# Patient Record
Sex: MALE | Race: WHITE | Employment: OTHER | URBAN - NONMETROPOLITAN AREA
[De-identification: names, ages, dates, MRNs, and addresses within clinical notes are randomized per-mention and may not be internally consistent; named-entity substitution may affect disease eponyms.]

---

## 2017-11-24 DIAGNOSIS — E11.9 DIABETES MELLITUS WITHOUT COMPLICATION (CMS/HCC): Primary | ICD-10-CM

## 2017-11-24 RX ORDER — METFORMIN HYDROCHLORIDE 500 MG/1
TABLET ORAL
Qty: 90 TABLET | Refills: 0 | Status: SHIPPED | OUTPATIENT
Start: 2017-11-24 | End: 2018-01-16 | Stop reason: SDUPTHER

## 2018-01-02 ENCOUNTER — ANCILLARY ORDERS (OUTPATIENT)
Dept: FAMILY MEDICINE | Facility: CLINIC | Age: 73
End: 2018-01-02
Payer: MEDICARE

## 2018-01-02 ENCOUNTER — TELEPHONE (OUTPATIENT)
Dept: FAMILY MEDICINE | Facility: CLINIC | Age: 73
End: 2018-01-02

## 2018-01-02 DIAGNOSIS — E11.9 TYPE 2 DIABETES MELLITUS WITHOUT COMPLICATION, UNSPECIFIED LONG TERM INSULIN USE STATUS: Primary | ICD-10-CM

## 2018-01-02 DIAGNOSIS — E11.9 TYPE 2 DIABETES MELLITUS WITHOUT COMPLICATION, UNSPECIFIED LONG TERM INSULIN USE STATUS: ICD-10-CM

## 2018-01-04 ENCOUNTER — APPOINTMENT (OUTPATIENT)
Dept: LAB | Facility: CLINIC | Age: 73
End: 2018-01-04
Payer: MEDICARE

## 2018-01-04 LAB
ALBUMIN SERPL-MCNC: 4.1 G/DL (ref 3.4–5)
ALP SERPL-CCNC: 64 U/L (ref 45–115)
ALT SERPL-CCNC: 61 U/L (ref 0–52)
ANION GAP SERPL CALC-SCNC: 7 MMOL/L (ref 3–11)
AST SERPL-CCNC: 36 U/L (ref 0–39)
BILIRUB SERPL-MCNC: 0.67 MG/DL (ref 0–1.4)
BUN SERPL-MCNC: 14 MG/DL (ref 7–25)
CALCIUM ALBUM COR SERPL-MCNC: 9.1 MG/DL (ref 8.5–10.1)
CALCIUM SERPL-MCNC: 9.2 MG/DL (ref 8.6–10.1)
CHLORIDE SERPL-SCNC: 101 MMOL/L (ref 98–107)
CO2 SERPL-SCNC: 29 MMOL/L (ref 21–32)
CREAT SERPL-MCNC: 1.1 MG/DL (ref 0.8–1.3)
EST. AVERAGE GLUCOSE BLD GHB EST-MCNC: 203 MG/DL
GFR SERPL CREATININE-BSD FRML MDRD: 66 ML/MIN/1.73M*2
GLUCOSE SERPL-MCNC: 221 MG/DL (ref 70–105)
HBA1C MFR BLD: 8.7 % (ref 4.8–6)
POTASSIUM SERPL-SCNC: 4.9 MMOL/L (ref 3.6–5)
PROT SERPL-MCNC: 7.5 G/DL (ref 6.4–8.2)
SODIUM SERPL-SCNC: 137 MMOL/L (ref 135–145)

## 2018-01-04 PROCEDURE — 83036 HEMOGLOBIN GLYCOSYLATED A1C: CPT | Performed by: PHYSICIAN ASSISTANT

## 2018-01-04 PROCEDURE — 80053 COMPREHEN METABOLIC PANEL: CPT | Performed by: PHYSICIAN ASSISTANT

## 2018-01-04 PROCEDURE — 36415 COLL VENOUS BLD VENIPUNCTURE: CPT | Performed by: PHYSICIAN ASSISTANT

## 2018-01-16 ENCOUNTER — OFFICE VISIT (OUTPATIENT)
Dept: FAMILY MEDICINE | Facility: CLINIC | Age: 73
End: 2018-01-16
Payer: MEDICARE

## 2018-01-16 VITALS
DIASTOLIC BLOOD PRESSURE: 85 MMHG | HEIGHT: 72 IN | RESPIRATION RATE: 12 BRPM | SYSTOLIC BLOOD PRESSURE: 125 MMHG | TEMPERATURE: 98 F | WEIGHT: 236 LBS | BODY MASS INDEX: 31.97 KG/M2 | HEART RATE: 75 BPM | OXYGEN SATURATION: 95 %

## 2018-01-16 DIAGNOSIS — K21.9 GASTROESOPHAGEAL REFLUX DISEASE, ESOPHAGITIS PRESENCE NOT SPECIFIED: ICD-10-CM

## 2018-01-16 DIAGNOSIS — E78.49 OTHER HYPERLIPIDEMIA: ICD-10-CM

## 2018-01-16 DIAGNOSIS — E11.8 TYPE 2 DIABETES MELLITUS WITH COMPLICATION, WITHOUT LONG-TERM CURRENT USE OF INSULIN (CMS/HCC): Primary | ICD-10-CM

## 2018-01-16 DIAGNOSIS — I10 ESSENTIAL HYPERTENSION: ICD-10-CM

## 2018-01-16 PROCEDURE — 99214 OFFICE O/P EST MOD 30 MIN: CPT | Performed by: PHYSICIAN ASSISTANT

## 2018-01-16 RX ORDER — METFORMIN HYDROCHLORIDE 500 MG/1
1000 TABLET ORAL 2 TIMES DAILY WITH MEALS
Qty: 360 TABLET | Refills: 3 | Status: SHIPPED | OUTPATIENT
Start: 2018-01-16 | End: 2019-01-16 | Stop reason: WASHOUT

## 2018-01-16 RX ORDER — ASPIRIN 81 MG/1
81 TABLET ORAL DAILY
COMMUNITY
End: 2022-09-22 | Stop reason: ALTCHOICE

## 2018-01-16 NOTE — PROGRESS NOTES
Subjective      Abhilash Huggins is a 72 y.o. male who presents for ***.    KANDI Hung presents to the clinic for recheck on Dm, HTN, and some neuropathy.  He was on lyrica.   The following have been reviewed and updated as appropriate in this visit:  No text in SmartText        Allergies   Allergen Reactions   • Amoxicillin      HIVES, NAUSEA   • Penicillins      HIVES, NAUSEA     Current Outpatient Prescriptions   Medication Sig Dispense Refill   • aspirin 81 mg EC tablet Take 81 mg by mouth daily.     • dexlansoprazole (DEXILANT) 60 mg capsule  30 0   • lisinopril (PRINIVIL,ZESTRIL) 5 mg tablet  90 0   • metFORMIN (GLUCOPHAGE) 500 mg tablet TAKE 1 TABLET BY MOUTH IN THE MORNING AND 2 TABLETS IN THE EVENING. 90 tablet 0   • metoprolol tartrate (LOPRESSOR) 25 mg tablet  60 0   • ranitidine (ZANTAC) 150 mg tablet one to two tablets by mouth at night as needed 90 0   • sitaGLIPtin (JANUVIA) 100 mg tablet  30 0   • atorvastatin (LIPITOR) 80 mg tablet  30 0   • clopidogrel (PLAVIX) 75 mg tablet  90 0     No current facility-administered medications for this visit.      History reviewed. No pertinent past medical history.  Past Surgical History:   Procedure Laterality Date   • CARDIAC SURGERY  01/04/2016    Cardiac bypass X4 vessels   • CARDIAC SURGERY  01/01/1997    2 stents   • CATARACT EXTRACTION Left    • CHOLECYSTECTOMY  02/14/2007   • COLONOSCOPY      Approx 8 years ago with Dr Mcguire- was to have 5 year follow up   • COLONOSCOPY  01/01/2015    Polyps     Family History   Problem Relation Age of Onset   • Heart disease Mother    • Heart attack Father      Myocardial infarction   • Diabetes type II Brother      Toes removed due to diabetes     Social History     Social History   • Marital status:      Spouse name: N/A   • Number of children: N/A   • Years of education: N/A     Social History Main Topics   • Smoking status: Former Smoker     Years: 20.00   • Smokeless tobacco: Never Used   • Alcohol use Yes       Comment: Occasionally   • Drug use: No   • Sexual activity: Not Asked     Other Topics Concern   • None     Social History Narrative   • None       Review of Systems    Objective   /85 (BP Location: Right arm, Patient Position: Sitting, Cuff Size: Reg)   Pulse 75   Temp 36.7 °C (98 °F) (Temporal)   Resp 12   Ht 1.829 m (6')   Wt 107 kg (236 lb)   SpO2 95%   BMI 32.01 kg/m²     Physical Exam    ASSESSMENT AND PLAN   There are no diagnoses linked to this encounter.    OBDULIA NOLEN

## 2018-01-16 NOTE — PROGRESS NOTES
Subjective      Abhilash Huggins is a 73 y.o. male who presents for recheck on DM.    HPI  Pt presents to the clinic for recheck on Dm. He has been out of his metformin for few weeks-has been using the Januvia.  He has no new eye concerns. He does have foot pain however declines trial of medication-failed lyrica-bad dreams.   He will be seeing his cardiologist in the next couple of weeks to help ensure not an arterial issue.  He currently denies any chest pain or tightness.GERD is controlled with the PPI.   He did get labs today.  ROS otherwise neg.   The following have been reviewed and updated as appropriate in this visit:  No text in SmartText        Allergies   Allergen Reactions   • Amoxicillin      HIVES, NAUSEA   • Penicillins      HIVES, NAUSEA     Current Outpatient Prescriptions   Medication Sig Dispense Refill   • aspirin 81 mg EC tablet Take 81 mg by mouth daily.     • dexlansoprazole (DEXILANT) 60 mg capsule  30 0   • lisinopril (PRINIVIL,ZESTRIL) 5 mg tablet  90 0   • metFORMIN (GLUCOPHAGE) 500 mg tablet Take 2 tablets (1,000 mg total) by mouth 2 (two) times a day with meals. 360 tablet 3   • metoprolol tartrate (LOPRESSOR) 25 mg tablet  60 0   • ranitidine (ZANTAC) 150 mg tablet one to two tablets by mouth at night as needed 90 0   • sitaGLIPtin (JANUVIA) 100 mg tablet  30 0   • atorvastatin (LIPITOR) 80 mg tablet  30 0   • canagliflozin 300 mg tablet Take 1 tablet (300 mg total) by mouth daily. 30 tablet 11   • clopidogrel (PLAVIX) 75 mg tablet  90 0     No current facility-administered medications for this visit.      History reviewed. No pertinent past medical history.  Past Surgical History:   Procedure Laterality Date   • CARDIAC SURGERY  01/04/2016    Cardiac bypass X4 vessels   • CARDIAC SURGERY  01/01/1997    2 stents   • CATARACT EXTRACTION Left    • CHOLECYSTECTOMY  02/14/2007   • COLONOSCOPY      Approx 8 years ago with Dr Mcguire- was to have 5 year follow up   • COLONOSCOPY  01/01/2015     Polyps     Family History   Problem Relation Age of Onset   • Heart disease Mother    • Heart attack Father      Myocardial infarction   • Diabetes type II Brother      Toes removed due to diabetes     Social History     Social History   • Marital status:      Spouse name: N/A   • Number of children: N/A   • Years of education: N/A     Social History Main Topics   • Smoking status: Former Smoker     Years: 20.00   • Smokeless tobacco: Never Used   • Alcohol use Yes      Comment: Occasionally   • Drug use: No   • Sexual activity: Not Asked     Other Topics Concern   • None     Social History Narrative   • None       Review of Systems    Objective   /85 (BP Location: Right arm, Patient Position: Sitting, Cuff Size: Reg)   Pulse 75   Temp 36.7 °C (98 °F) (Temporal)   Resp 12   Ht 1.829 m (6')   Wt 107 kg (236 lb)   SpO2 95%   BMI 32.01 kg/m²     Physical Exam   Constitutional: He is oriented to person, place, and time. He appears well-developed and well-nourished. No distress.   HENT:   Head: Normocephalic.   Nose: Nose normal.   Mouth/Throat: Oropharynx is clear and moist. No oropharyngeal exudate.   Eyes: Conjunctivae and EOM are normal. Pupils are equal, round, and reactive to light.   Neck: No thyromegaly present.   Cardiovascular: Normal rate, regular rhythm and normal heart sounds.    Pulmonary/Chest: Effort normal and breath sounds normal.   Abdominal: Soft. Bowel sounds are normal.   Neurological: He is alert and oriented to person, place, and time. He has normal reflexes.   Skin: Skin is warm and dry. He is not diaphoretic.   Psychiatric: He has a normal mood and affect. His behavior is normal.   Nursing note and vitals reviewed.      ASSESSMENT AND PLAN   Diagnoses and all orders for this visit:    Type 2 diabetes mellitus with complication, without long-term current use of insulin (CMS/[facility])  -     metFORMIN (GLUCOPHAGE) 500 mg tablet; Take 2 tablets (1,000 mg total) by mouth 2 (two) times  a day with meals.  -     canagliflozin 300 mg tablet; Take 1 tablet (300 mg total) by mouth daily.    Other hyperlipidemia    Gastroesophageal reflux disease, esophagitis presence not specified    Essential hypertension      A1c is elevated thus will add invokana half tablet daily.  Patient will continue on other meds as ordered. To consider nutritional counseling.   Blood pressure stable.  Patient follow-up with cardiologist in the next couple weeks and get a note to us.  Consider medicines for peripheral neuropathy at this point he declines.  Controlled with Dexilant.  See Jeannette Bailey notes for specifics.  Total time face to face spent with patient was 25 minutes with 50% or more in counseling and coordination of care regarding ADA Standards of Care, diabetes management, labs, medications, medication side effect, hyper/hypoglycemia, complications for diabetes, hypertension and hyperlipidemia.    OBDULIA NOLEN

## 2018-01-17 NOTE — PROGRESS NOTES
"Diabetes Clinic Visit:    Eye exam: 8/30/17 by Dr. Pritchett at [facility]  Foot exam: Conducted today using 10 gm monofilament. Pt states he has bad neuropathy however he was sensate bilat to monofilament testing. Use Emu Oil and Magna Life salve on his feet and feels both help relieve discomfort. States was on Lyrica at one point and it helped a lot but gave him bad/violent dreams so it was stopped. Rates pain (scale of 1-10) at a 4 and by evening a 9.  Often wakes him up middle of the night and then he sleeps in his recliner. Because of foot pain he does not walk long distances but he can use stationary bike without added discomfort.  A1C: 8.7% on 1/4/18 and fasting glucose was 221 mg/dl.    Takes metformin 500 mg in the morning and 1000 mg in the evening but started to run out before [place] so he was \"stretching\" his dosing by just doing 500mg BID. Went to [place] to see family and ran out of med around first of year. Still taking Januiva 100 mg daily. Also on lisinopril and atorvastatin.  He feels this is what caused the A1C to be much higher. A1C was 7.5% 9/12/16. Explained how A1C captures past 3 months so results indicate he has been running higher for some time and may need med change.  Has meter but refuses to test at home.  Interested in the Freestyle Reshma CGM and would be willing to purchase if not covered by Medicare and at reasonable cost. Discussed diet. Brother, who had diabetes, passed away in October and patient says he suffered from diabetes complications the last 2 years of his life.  States it has made him think more about his own diabetes and that he needs to take it more seriously. Discussed education classes and he would like to attend our next session that starts.      Recommendations: 1) Patient to resume metformin with possible increase to max dose if able to tolerate.                                   2) DSMT and MNT referrals - offered [facility] and [place] locations. Prefers [facility].  Lives in [place].                                   3) Educator to help patient obtain information on the [employer].    Will f/u as scheduled in clinic.

## 2018-01-29 ENCOUNTER — COMMUNICATION - HEALTHEAST (OUTPATIENT)
Dept: TELEHEALTH | Facility: CLINIC | Age: 73
End: 2018-01-29

## 2018-01-29 ENCOUNTER — OFFICE VISIT - HEALTHEAST (OUTPATIENT)
Dept: CARDIOLOGY | Facility: CLINIC | Age: 73
End: 2018-01-29

## 2018-01-29 DIAGNOSIS — I51.89 LEFT VENTRICULAR SYSTOLIC DYSFUNCTION, NYHA CLASS 2: ICD-10-CM

## 2018-01-29 DIAGNOSIS — I25.119 CORONARY ARTERY DISEASE INVOLVING NATIVE CORONARY ARTERY OF NATIVE HEART WITH ANGINA PECTORIS (H): ICD-10-CM

## 2018-01-29 DIAGNOSIS — E78.5 HYPERLIPIDEMIA: ICD-10-CM

## 2018-01-29 DIAGNOSIS — I10 ESSENTIAL HYPERTENSION: ICD-10-CM

## 2018-01-29 DIAGNOSIS — E11.9 TYPE 2 DIABETES MELLITUS WITHOUT COMPLICATION, WITHOUT LONG-TERM CURRENT USE OF INSULIN (H): ICD-10-CM

## 2018-01-29 DIAGNOSIS — Z95.1 S/P CABG X 4: ICD-10-CM

## 2018-01-29 PROBLEM — N40.0 BENIGN PROSTATIC HYPERTROPHY WITHOUT URINARY OBSTRUCTION: Status: ACTIVE | Noted: 2018-01-29

## 2018-01-29 PROBLEM — K59.00 CONSTIPATION: Status: ACTIVE | Noted: 2018-01-29

## 2018-01-29 PROBLEM — G90.9 DISORDER OF PERIPHERAL AUTONOMIC NERVOUS SYSTEM: Status: ACTIVE | Noted: 2018-01-29

## 2018-01-29 PROBLEM — K21.9 GASTROESOPHAGEAL REFLUX DISEASE: Status: ACTIVE | Noted: 2018-01-29

## 2018-01-29 RX ORDER — CHLORAL HYDRATE 500 MG
2 CAPSULE ORAL 2 TIMES DAILY
Status: SHIPPED | COMMUNITY
Start: 2018-01-29

## 2018-01-29 RX ORDER — ISOSORBIDE MONONITRATE 30 MG/1
30 TABLET, EXTENDED RELEASE ORAL DAILY
Qty: 90 TABLET | Refills: 3 | Status: SHIPPED | OUTPATIENT
Start: 2018-01-29

## 2018-01-29 ASSESSMENT — MIFFLIN-ST. JEOR: SCORE: 1860.04

## 2018-02-02 ENCOUNTER — RECORDS - HEALTHEAST (OUTPATIENT)
Dept: ADMINISTRATIVE | Facility: OTHER | Age: 73
End: 2018-02-02

## 2018-02-02 ENCOUNTER — AMBULATORY - HEALTHEAST (OUTPATIENT)
Dept: CARDIOLOGY | Facility: CLINIC | Age: 73
End: 2018-02-02

## 2018-03-12 DIAGNOSIS — I10 ESSENTIAL HYPERTENSION: Primary | ICD-10-CM

## 2018-03-13 RX ORDER — LISINOPRIL 5 MG/1
TABLET ORAL
Qty: 90 TABLET | Refills: 0 | Status: SHIPPED | OUTPATIENT
Start: 2018-03-13 | End: 2018-06-20 | Stop reason: SDUPTHER

## 2018-03-23 ENCOUNTER — OFFICE VISIT (OUTPATIENT)
Dept: FAMILY MEDICINE | Facility: CLINIC | Age: 73
End: 2018-03-23
Payer: MEDICARE

## 2018-03-23 VITALS
HEART RATE: 79 BPM | WEIGHT: 227 LBS | RESPIRATION RATE: 12 BRPM | OXYGEN SATURATION: 98 % | BODY MASS INDEX: 30.75 KG/M2 | TEMPERATURE: 98 F | SYSTOLIC BLOOD PRESSURE: 125 MMHG | HEIGHT: 72 IN | DIASTOLIC BLOOD PRESSURE: 80 MMHG

## 2018-03-23 DIAGNOSIS — E11.8 TYPE 2 DIABETES MELLITUS WITH COMPLICATION, WITHOUT LONG-TERM CURRENT USE OF INSULIN (CMS/HCC): ICD-10-CM

## 2018-03-23 DIAGNOSIS — S46.211A BICEPS MUSCLE TEAR, RIGHT, INITIAL ENCOUNTER: Primary | ICD-10-CM

## 2018-03-23 PROCEDURE — 99211 OFF/OP EST MAY X REQ PHY/QHP: CPT

## 2018-03-23 PROCEDURE — 99213 OFFICE O/P EST LOW 20 MIN: CPT | Performed by: PHYSICIAN ASSISTANT

## 2018-03-30 NOTE — PROGRESS NOTES
Subjective      Abhilash Huggins is a 73 y.o. male who presents for arm bruising and pain in the right arm.    HPI  Right arm pain, lifted something and felt a pop. Large amount of bruising around his biceps muscle. Most of the pain is up near the shoulder.  Patient with similar issues on the left side he was seen by or so and at that point no thing surgical was completed due to his declining any therapy.  He currently denies numbness tingling down the arm.  He reports his sugars have been fine.  ROS otherwise neg.        The following have been reviewed and updated as appropriate in this visit:  No text in SmartText        Allergies   Allergen Reactions   • Amoxicillin      HIVES, NAUSEA   • Penicillins      HIVES, NAUSEA     Current Outpatient Prescriptions   Medication Sig Dispense Refill   • aspirin 81 mg EC tablet Take 81 mg by mouth daily.     • atorvastatin (LIPITOR) 80 mg tablet  30 0   • canagliflozin 300 mg tablet Take 1 tablet (300 mg total) by mouth daily. 30 tablet 11   • clopidogrel (PLAVIX) 75 mg tablet  90 0   • dexlansoprazole (DEXILANT) 60 mg capsule  30 0   • lisinopril (PRINIVIL,ZESTRIL) 5 mg tablet TAKE ONE TABLET BY MOUTH EVERY DAY 90 tablet 0   • metFORMIN (GLUCOPHAGE) 500 mg tablet Take 2 tablets (1,000 mg total) by mouth 2 (two) times a day with meals. 360 tablet 3   • metoprolol tartrate (LOPRESSOR) 25 mg tablet  60 0   • ranitidine (ZANTAC) 150 mg tablet one to two tablets by mouth at night as needed 90 0     No current facility-administered medications for this visit.      No past medical history on file.  Past Surgical History:   Procedure Laterality Date   • CARDIAC SURGERY  01/04/2016    Cardiac bypass X4 vessels   • CARDIAC SURGERY  01/01/1997    2 stents   • CATARACT EXTRACTION Left    • CHOLECYSTECTOMY  02/14/2007   • COLONOSCOPY      Approx 8 years ago with Dr Mcguire- was to have 5 year follow up   • COLONOSCOPY  01/01/2015    Polyps     Family History   Problem Relation Age of  Onset   • Heart disease Mother    • Heart attack Father      Myocardial infarction   • Diabetes type II Brother      Toes removed due to diabetes     Social History     Social History   • Marital status:      Spouse name: N/A   • Number of children: N/A   • Years of education: N/A     Social History Main Topics   • Smoking status: Former Smoker     Years: 20.00   • Smokeless tobacco: Never Used   • Alcohol use Yes      Comment: Occasionally   • Drug use: No   • Sexual activity: Not Asked     Other Topics Concern   • None     Social History Narrative   • None       Review of Systems    Objective   /80 (BP Location: Right arm, Patient Position: Sitting, Cuff Size: Reg)   Pulse 79   Temp 36.7 °C (98 °F) (Temporal)   Resp 12   Ht 1.829 m (6')   Wt 103 kg (227 lb)   SpO2 98%   BMI 30.79 kg/m²     Physical Exam   Constitutional: He is oriented to person, place, and time. He appears well-developed and well-nourished. No distress.   HENT:   Head: Normocephalic and atraumatic.   Nose: Nose normal.   Mouth/Throat: Oropharynx is clear and moist. No oropharyngeal exudate.   Eyes: Conjunctivae and EOM are normal. Pupils are equal, round, and reactive to light.   Neck: Normal range of motion. No thyromegaly present.   Cardiovascular: Normal rate, regular rhythm and normal heart sounds.    Pulmonary/Chest: Effort normal and breath sounds normal.   Abdominal: Soft. Bowel sounds are normal.   Musculoskeletal: He exhibits edema and tenderness.        Right elbow: He exhibits decreased range of motion, swelling and effusion.   Bruising and swelling of the right arm-declines ortho   Neurological: He is alert and oriented to person, place, and time. He has normal reflexes.   Skin: Skin is warm and dry. He is not diaphoretic.   Psychiatric: He has a normal mood and affect. His behavior is normal.   Nursing note and vitals reviewed.      Assessment/Plan   Diagnoses and all orders for this visit:    Biceps muscle tear,  right, initial encounter    Type 2 diabetes mellitus with complication, without long-term current use of insulin (CMS/[facility])      Pt is declining ortho.  For now no further treatment needed.  Most recent A1c over 8 in January.  Adjustments were made then.  Advised needs to RTC in April for recheck sooner for changes.  Continue to exercise and eat healthy.    OBDULIA NOLEN

## 2018-05-18 ENCOUNTER — COMMUNICATION - HEALTHEAST (OUTPATIENT)
Dept: ADMINISTRATIVE | Facility: CLINIC | Age: 73
End: 2018-05-18

## 2018-05-29 DIAGNOSIS — E78.5 HYPERLIPIDEMIA, UNSPECIFIED HYPERLIPIDEMIA TYPE: Primary | ICD-10-CM

## 2018-05-29 RX ORDER — ATORVASTATIN CALCIUM 80 MG/1
TABLET, FILM COATED ORAL
Qty: 90 TABLET | Refills: 0 | Status: SHIPPED | OUTPATIENT
Start: 2018-05-29 | End: 2018-09-10 | Stop reason: SDUPTHER

## 2018-06-20 DIAGNOSIS — K21.9 GASTROESOPHAGEAL REFLUX DISEASE, ESOPHAGITIS PRESENCE NOT SPECIFIED: Primary | ICD-10-CM

## 2018-06-20 DIAGNOSIS — I10 ESSENTIAL HYPERTENSION: ICD-10-CM

## 2018-06-20 RX ORDER — LISINOPRIL 5 MG/1
TABLET ORAL
Qty: 90 TABLET | Refills: 0 | Status: SHIPPED | OUTPATIENT
Start: 2018-06-20 | End: 2018-10-03 | Stop reason: SDUPTHER

## 2018-06-20 RX ORDER — DEXLANSOPRAZOLE 60 MG/1
CAPSULE, DELAYED RELEASE ORAL
Qty: 30 CAPSULE | Refills: 8 | Status: SHIPPED | OUTPATIENT
Start: 2018-06-20 | End: 2019-07-22 | Stop reason: SDUPTHER

## 2018-07-21 DIAGNOSIS — I10 ESSENTIAL HYPERTENSION: Primary | ICD-10-CM

## 2018-07-21 RX ORDER — METOPROLOL TARTRATE 25 MG/1
TABLET, FILM COATED ORAL
Qty: 60 TABLET | Refills: 8 | Status: SHIPPED | OUTPATIENT
Start: 2018-07-21 | End: 2019-02-19 | Stop reason: SDUPTHER

## 2018-07-21 RX ORDER — CLOPIDOGREL BISULFATE 75 MG/1
TABLET ORAL
Qty: 90 TABLET | Refills: 1 | Status: SHIPPED | OUTPATIENT
Start: 2018-07-21 | End: 2019-01-23 | Stop reason: SDUPTHER

## 2018-09-10 DIAGNOSIS — E78.5 HYPERLIPIDEMIA, UNSPECIFIED HYPERLIPIDEMIA TYPE: ICD-10-CM

## 2018-09-10 RX ORDER — ATORVASTATIN CALCIUM 80 MG/1
TABLET, FILM COATED ORAL
Qty: 90 TABLET | Refills: 0 | Status: SHIPPED | OUTPATIENT
Start: 2018-09-10 | End: 2018-12-05 | Stop reason: SDUPTHER

## 2018-09-11 ENCOUNTER — COMMUNICATION - HEALTHEAST (OUTPATIENT)
Dept: TELEHEALTH | Facility: CLINIC | Age: 73
End: 2018-09-11

## 2018-09-11 ENCOUNTER — OFFICE VISIT - HEALTHEAST (OUTPATIENT)
Dept: CARDIOLOGY | Facility: CLINIC | Age: 73
End: 2018-09-11

## 2018-09-11 DIAGNOSIS — E11.9 TYPE 2 DIABETES MELLITUS WITHOUT COMPLICATION, WITHOUT LONG-TERM CURRENT USE OF INSULIN (H): ICD-10-CM

## 2018-09-11 DIAGNOSIS — E11.42 DIABETIC POLYNEUROPATHY ASSOCIATED WITH TYPE 2 DIABETES MELLITUS (H): ICD-10-CM

## 2018-09-11 DIAGNOSIS — I50.22 CHF (CONGESTIVE HEART FAILURE), NYHA CLASS II, CHRONIC, SYSTOLIC (H): ICD-10-CM

## 2018-09-11 DIAGNOSIS — I25.118 CORONARY ARTERY DISEASE OF NATIVE ARTERY OF NATIVE HEART WITH STABLE ANGINA PECTORIS (H): ICD-10-CM

## 2018-09-11 DIAGNOSIS — Z95.1 S/P CABG X 4: ICD-10-CM

## 2018-09-11 RX ORDER — GABAPENTIN 100 MG/1
100 CAPSULE ORAL 2 TIMES DAILY
Qty: 60 CAPSULE | Refills: 3 | Status: SHIPPED | OUTPATIENT
Start: 2018-09-11

## 2018-09-11 RX ORDER — DEXLANSOPRAZOLE 60 MG/1
60 CAPSULE, DELAYED RELEASE ORAL DAILY
Status: SHIPPED | COMMUNITY
Start: 2018-09-11

## 2018-09-11 ASSESSMENT — MIFFLIN-ST. JEOR: SCORE: 1793.13

## 2018-10-03 ENCOUNTER — OFFICE VISIT (OUTPATIENT)
Dept: FAMILY MEDICINE | Facility: CLINIC | Age: 73
End: 2018-10-03
Payer: MEDICARE

## 2018-10-03 ENCOUNTER — HOSPITAL ENCOUNTER (OUTPATIENT)
Dept: RADIOLOGY | Facility: HOSPITAL | Age: 73
Discharge: HOME | End: 2018-10-03
Payer: MEDICARE

## 2018-10-03 VITALS
SYSTOLIC BLOOD PRESSURE: 132 MMHG | TEMPERATURE: 99.3 F | RESPIRATION RATE: 18 BRPM | HEART RATE: 85 BPM | DIASTOLIC BLOOD PRESSURE: 76 MMHG | OXYGEN SATURATION: 95 %

## 2018-10-03 DIAGNOSIS — R05.9 COUGH: ICD-10-CM

## 2018-10-03 DIAGNOSIS — E78.49 OTHER HYPERLIPIDEMIA: ICD-10-CM

## 2018-10-03 DIAGNOSIS — Z12.5 SCREENING PSA (PROSTATE SPECIFIC ANTIGEN): ICD-10-CM

## 2018-10-03 DIAGNOSIS — J30.1 ALLERGIC RHINITIS DUE TO POLLEN, UNSPECIFIED SEASONALITY: ICD-10-CM

## 2018-10-03 DIAGNOSIS — E11.8 TYPE 2 DIABETES MELLITUS WITH COMPLICATION, WITHOUT LONG-TERM CURRENT USE OF INSULIN (CMS/HCC): ICD-10-CM

## 2018-10-03 DIAGNOSIS — J06.9 UPPER RESPIRATORY TRACT INFECTION, UNSPECIFIED TYPE: Primary | ICD-10-CM

## 2018-10-03 DIAGNOSIS — I10 ESSENTIAL HYPERTENSION: ICD-10-CM

## 2018-10-03 PROCEDURE — 71046 X-RAY EXAM CHEST 2 VIEWS: CPT

## 2018-10-03 PROCEDURE — 99214 OFFICE O/P EST MOD 30 MIN: CPT | Mod: GF | Performed by: PHYSICIAN ASSISTANT

## 2018-10-03 PROCEDURE — 99211 OFF/OP EST MAY X REQ PHY/QHP: CPT

## 2018-10-03 RX ORDER — FLUTICASONE PROPIONATE 50 MCG
2 SPRAY, SUSPENSION (ML) NASAL DAILY
Qty: 16 G | Refills: 3 | Status: SHIPPED | OUTPATIENT
Start: 2018-10-03 | End: 2019-02-19 | Stop reason: ALTCHOICE

## 2018-10-03 RX ORDER — MONTELUKAST SODIUM 10 MG/1
10 TABLET ORAL NIGHTLY
Qty: 30 TABLET | Refills: 11 | Status: SHIPPED | OUTPATIENT
Start: 2018-10-03 | End: 2019-02-19 | Stop reason: ALTCHOICE

## 2018-10-03 RX ORDER — DOXYCYCLINE 100 MG/1
100 CAPSULE ORAL 2 TIMES DAILY
Qty: 20 CAPSULE | Refills: 0 | Status: SHIPPED | OUTPATIENT
Start: 2018-10-03 | End: 2018-10-13

## 2018-10-03 RX ORDER — LISINOPRIL 5 MG/1
5 TABLET ORAL DAILY
Qty: 90 TABLET | Refills: 3 | Status: SHIPPED | OUTPATIENT
Start: 2018-10-03 | End: 2019-07-22 | Stop reason: SDUPTHER

## 2018-10-03 NOTE — PROGRESS NOTES
Subjective      Abhilash Huggins is a 73 y.o. male who presents for recheck increased cough and congestion.     HPI  Pt presents to the clinic for cough, congestion and sinus issues over a few months.  He was getting better and then over the last week really noticed at bedtime can not sleep due to the cough.  No fevers. No ear pain.  Lots of congestion, headache, and sinus pressure.  No wheezing however feels tight.  He reports sugars have not been good-last visit for this was in January.  He declines labs today as he will stop in with an apt next week to get this done and recheck these.  ROS otherwise neg.   The following have been reviewed and updated as appropriate in this visit:         Allergies   Allergen Reactions   • Amoxicillin      HIVES, NAUSEA   • Penicillins      HIVES, NAUSEA     Current Outpatient Prescriptions   Medication Sig Dispense Refill   • aspirin 81 mg EC tablet Take 81 mg by mouth daily.     • atorvastatin (LIPITOR) 80 mg tablet TAKE ONE TABLET BY MOUTH EVERY DAY. NEEDS APPOINTMENT. 90 tablet 0   • canagliflozin 300 mg tablet Take 1 tablet (300 mg total) by mouth daily. (Patient taking differently: Take 0.5 tablets by mouth daily.  ) 30 tablet 11   • clopidogrel (PLAVIX) 75 mg tablet TAKE ONE TABLET BY MOUTH EVERY DAY 90 tablet 1   • DEXILANT 60 mg capsule TAKE ONE CAPSULE BY MOUTH EVERY DAY 30 capsule 8   • lisinopril (PRINIVIL,ZESTRIL) 5 mg tablet Take 1 tablet (5 mg total) by mouth daily. 90 tablet 3   • metFORMIN (GLUCOPHAGE) 500 mg tablet Take 2 tablets (1,000 mg total) by mouth 2 (two) times a day with meals. 360 tablet 3   • metoprolol tartrate (LOPRESSOR) 25 mg tablet TAKE ONE TABLET BY MOUTH TWICE DAILY 60 tablet 8   • ranitidine (ZANTAC) 150 mg tablet one to two tablets by mouth at night as needed 90 0   • doxycycline (MONODOX) 100 mg capsule Take 1 capsule (100 mg total) by mouth 2 (two) times a day for 10 days. 20 capsule 0   • fluticasone (FLONASE) 50 mcg/actuation nasal spray  Administer 2 sprays into each nostril daily. 16 g 3   • montelukast (SINGULAIR) 10 mg tablet Take 1 tablet (10 mg total) by mouth nightly. 30 tablet 11     No current facility-administered medications for this visit.      History reviewed. No pertinent past medical history.  Past Surgical History:   Procedure Laterality Date   • CARDIAC SURGERY  01/04/2016    Cardiac bypass X4 vessels   • CARDIAC SURGERY  01/01/1997    2 stents   • CATARACT EXTRACTION Left    • CHOLECYSTECTOMY  02/14/2007   • COLONOSCOPY      Approx 8 years ago with Dr Mcguire- was to have 5 year follow up   • COLONOSCOPY  01/01/2015    Polyps     Family History   Problem Relation Age of Onset   • Heart disease Mother    • Heart attack Father         Myocardial infarction   • Diabetes type II Brother         Toes removed due to diabetes     Social History     Social History   • Marital status:      Spouse name: N/A   • Number of children: N/A   • Years of education: N/A     Social History Main Topics   • Smoking status: Former Smoker     Years: 20.00   • Smokeless tobacco: Never Used   • Alcohol use Yes      Comment: Occasionally   • Drug use: No   • Sexual activity: Not Asked     Other Topics Concern   • None     Social History Narrative   • None       Review of Systems    Objective   /76 (BP Location: Left arm, Patient Position: Sitting, Cuff Size: Reg)   Pulse 85   Temp 37.4 °C (99.3 °F) (Temporal)   Resp 18   SpO2 95%     Physical Exam   Constitutional: He is oriented to person, place, and time. He appears well-developed and well-nourished. No distress.   HENT:   Head: Normocephalic and atraumatic.   Right Ear: Tympanic membrane is not erythematous, not retracted and not bulging. A middle ear effusion is present.   Left Ear: Tympanic membrane is not erythematous, not retracted and not bulging. A middle ear effusion is present.   Nose: Rhinorrhea present. Right sinus exhibits maxillary sinus tenderness. Left sinus exhibits  maxillary sinus tenderness.   Mouth/Throat: Posterior oropharyngeal erythema present. No oropharyngeal exudate.   Eyes: Pupils are equal, round, and reactive to light. Conjunctivae and EOM are normal.   Neck: Normal range of motion. No thyromegaly present.   Cardiovascular: Normal rate, regular rhythm and normal heart sounds.    Pulmonary/Chest: Effort normal and breath sounds normal.   Neurological: He is alert and oriented to person, place, and time.   Skin: Skin is warm and dry. He is not diaphoretic.   Psychiatric: He has a normal mood and affect. His behavior is normal.   Nursing note and vitals reviewed.      ASSESSMENT AND PLAN   Diagnoses and all orders for this visit:    Upper respiratory tract infection, unspecified type    Cough  -     X-ray chest 2 views; Future  -     montelukast (SINGULAIR) 10 mg tablet; Take 1 tablet (10 mg total) by mouth nightly.  -     doxycycline (MONODOX) 100 mg capsule; Take 1 capsule (100 mg total) by mouth 2 (two) times a day for 10 days.    Essential hypertension  -     lisinopril (PRINIVIL,ZESTRIL) 5 mg tablet; Take 1 tablet (5 mg total) by mouth daily.    Allergic rhinitis due to pollen, unspecified seasonality  -     fluticasone (FLONASE) 50 mcg/actuation nasal spray; Administer 2 sprays into each nostril daily.    Other hyperlipidemia  -     Lipid panel Blood, Venous; Future    Type 2 diabetes mellitus with complication, without long-term current use of insulin (CMS/[facility]) ([facility])  -     Comprehensive metabolic panel Blood, Venous; Standing  -     Hemoglobin A1c (glycosylated) Blood, Venous; Standing    Screening PSA (prostate specific antigen)  -     PSA screen Blood, Venous; Future    Patient with upper respiratory infection.  Will start antibiotics.  We will start Flonase and singular.  CXR today is neg for new concerns reading per radiology.  He is overdue for labwork and recheck on the above medical issues.  He was advised to make an apt next week and come in fasting  sooner for any changes in status or no improvement in symptoms.       HARRIETT ISAAC PA

## 2018-10-09 ENCOUNTER — OFFICE VISIT (OUTPATIENT)
Dept: FAMILY MEDICINE | Facility: CLINIC | Age: 73
End: 2018-10-09
Payer: MEDICARE

## 2018-10-09 ENCOUNTER — APPOINTMENT (OUTPATIENT)
Dept: LAB | Facility: CLINIC | Age: 73
End: 2018-10-09
Payer: MEDICARE

## 2018-10-09 VITALS
OXYGEN SATURATION: 93 % | TEMPERATURE: 99.4 F | SYSTOLIC BLOOD PRESSURE: 118 MMHG | HEART RATE: 66 BPM | BODY MASS INDEX: 30.46 KG/M2 | DIASTOLIC BLOOD PRESSURE: 80 MMHG | WEIGHT: 224.6 LBS

## 2018-10-09 DIAGNOSIS — E78.49 OTHER HYPERLIPIDEMIA: ICD-10-CM

## 2018-10-09 DIAGNOSIS — E11.9 TYPE 2 DIABETES MELLITUS WITHOUT COMPLICATION, WITHOUT LONG-TERM CURRENT USE OF INSULIN (CMS/HCC): ICD-10-CM

## 2018-10-09 DIAGNOSIS — J02.9 ACUTE PHARYNGITIS, UNSPECIFIED ETIOLOGY: Primary | ICD-10-CM

## 2018-10-09 DIAGNOSIS — Z12.5 SCREENING PSA (PROSTATE SPECIFIC ANTIGEN): ICD-10-CM

## 2018-10-09 DIAGNOSIS — E11.8 TYPE 2 DIABETES MELLITUS WITH COMPLICATION, WITHOUT LONG-TERM CURRENT USE OF INSULIN (CMS/HCC): ICD-10-CM

## 2018-10-09 DIAGNOSIS — E78.5 HYPERLIPIDEMIA, UNSPECIFIED HYPERLIPIDEMIA TYPE: ICD-10-CM

## 2018-10-09 DIAGNOSIS — K21.9 GASTROESOPHAGEAL REFLUX DISEASE, ESOPHAGITIS PRESENCE NOT SPECIFIED: ICD-10-CM

## 2018-10-09 PROBLEM — I25.118 CORONARY ARTERY DISEASE OF NATIVE ARTERY OF NATIVE HEART WITH STABLE ANGINA PECTORIS (CMS/HCC): Status: ACTIVE | Noted: 2018-10-09

## 2018-10-09 PROBLEM — E11.42 DIABETIC POLYNEUROPATHY ASSOCIATED WITH TYPE 2 DIABETES MELLITUS (CMS/HCC): Status: ACTIVE | Noted: 2018-09-11

## 2018-10-09 LAB
ALBUMIN SERPL-MCNC: 4.8 G/DL (ref 3.5–5.3)
ALP SERPL-CCNC: 49 U/L (ref 45–115)
ALT SERPL-CCNC: 34 U/L (ref 0–52)
ANION GAP SERPL CALC-SCNC: 11 MMOL/L (ref 3–11)
AST SERPL-CCNC: 25 U/L (ref 0–39)
BILIRUB SERPL-MCNC: 1 MG/DL (ref 0–1.4)
BUN SERPL-MCNC: 20 MG/DL (ref 7–25)
CALCIUM ALBUM COR SERPL-MCNC: 9.3 MG/DL (ref 8.5–10.1)
CALCIUM SERPL-MCNC: 9.9 MG/DL (ref 8.6–10.3)
CHLORIDE SERPL-SCNC: 99 MMOL/L (ref 98–107)
CHOLEST SERPL-MCNC: 119 MG/DL (ref 0–199)
CO2 SERPL-SCNC: 26 MMOL/L (ref 21–32)
CREAT SERPL-MCNC: 0.9 MG/DL (ref 0.7–1.3)
EST. AVERAGE GLUCOSE BLD GHB EST-MCNC: 165.7 MG/DL
FASTING STATUS PATIENT QL REPORTED: YES
GFR SERPL CREATININE-BSD FRML MDRD: 83 ML/MIN/1.73M*2
GLUCOSE SERPL-MCNC: 192 MG/DL (ref 70–105)
HBA1C MFR BLD: 7.4 % (ref 4.8–6)
HDLC SERPL-MCNC: 28 MG/DL
LDLC SERPL CALC-MCNC: 25 MG/DL (ref 20–99)
POTASSIUM SERPL-SCNC: 4.4 MMOL/L (ref 3.5–5.1)
PROT SERPL-MCNC: 7.4 G/DL (ref 6–8.3)
PSA SERPL-MCNC: 1.18 NG/ML (ref 0–4)
SODIUM SERPL-SCNC: 136 MMOL/L (ref 135–145)
TRIGL SERPL-MCNC: 328 MG/DL

## 2018-10-09 PROCEDURE — 83036 HEMOGLOBIN GLYCOSYLATED A1C: CPT

## 2018-10-09 PROCEDURE — 80061 LIPID PANEL: CPT

## 2018-10-09 PROCEDURE — 36415 COLL VENOUS BLD VENIPUNCTURE: CPT

## 2018-10-09 PROCEDURE — 84153 ASSAY OF PSA TOTAL: CPT

## 2018-10-09 PROCEDURE — 80053 COMPREHEN METABOLIC PANEL: CPT

## 2018-10-09 PROCEDURE — 99211 OFF/OP EST MAY X REQ PHY/QHP: CPT

## 2018-10-09 PROCEDURE — 99214 OFFICE O/P EST MOD 30 MIN: CPT | Mod: GF | Performed by: PHYSICIAN ASSISTANT

## 2018-10-09 RX ORDER — PREDNISONE 20 MG/1
20 TABLET ORAL DAILY
Qty: 5 TABLET | Refills: 0 | Status: SHIPPED | OUTPATIENT
Start: 2018-10-09 | End: 2018-10-14

## 2018-10-09 NOTE — PROGRESS NOTES
Subjective      Abhilash Huggins is a 73 y.o. male who presents for recheck on multiple medical concerns    HPI  Presents to clinic for recheck of multiple medical concerns.  He does report he feels his sugars might be a little bit higher lately as his diet stress.  He continues to stay active patient denies any new eye and/or foot concerns.  He has been on antibiotics Flonase sensing respiratory symptoms that he was seen for last week.  He does not feel like the throat is getting any better still feels some pressure tightness and it patient had a chest x-ray last week which was negative for concerns.  Patient did get labs today and request reports.  He currently denies any fevers, chills, sweats.  No pain or chest tightness.  No wheezing.  No nausea or vomiting.  Patient is sleeping fine at night please get controlled acid reflux with the Zantac and Dexilant.  Mood stable.  Review of systems otherwise negative note patient continues to follow cardiology outside of [facility] these records are available on [facility] system.       The following have been reviewed and updated as appropriate in this visit:  Allergies  Meds  Problems         Allergies   Allergen Reactions   • Amoxicillin      HIVES, NAUSEA   • Penicillins      HIVES, NAUSEA     Current Outpatient Prescriptions   Medication Sig Dispense Refill   • aspirin 81 mg EC tablet Take 81 mg by mouth daily.     • atorvastatin (LIPITOR) 80 mg tablet TAKE ONE TABLET BY MOUTH EVERY DAY. NEEDS APPOINTMENT. 90 tablet 0   • canagliflozin 300 mg tablet Take 1 tablet (300 mg total) by mouth daily. (Patient taking differently: Take 0.5 tablets by mouth daily.  ) 30 tablet 11   • clopidogrel (PLAVIX) 75 mg tablet TAKE ONE TABLET BY MOUTH EVERY DAY 90 tablet 1   • DEXILANT 60 mg capsule TAKE ONE CAPSULE BY MOUTH EVERY DAY 30 capsule 8   • doxycycline (MONODOX) 100 mg capsule Take 1 capsule (100 mg total) by mouth 2 (two) times a day for 10 days. 20 capsule 0   • fluticasone  (FLONASE) 50 mcg/actuation nasal spray Administer 2 sprays into each nostril daily. 16 g 3   • lisinopril (PRINIVIL,ZESTRIL) 5 mg tablet Take 1 tablet (5 mg total) by mouth daily. 90 tablet 3   • metFORMIN (GLUCOPHAGE) 500 mg tablet Take 2 tablets (1,000 mg total) by mouth 2 (two) times a day with meals. 360 tablet 3   • metoprolol tartrate (LOPRESSOR) 25 mg tablet TAKE ONE TABLET BY MOUTH TWICE DAILY 60 tablet 8   • montelukast (SINGULAIR) 10 mg tablet Take 1 tablet (10 mg total) by mouth nightly. 30 tablet 11   • ranitidine (ZANTAC) 150 mg tablet one to two tablets by mouth at night as needed 90 0   • predniSONE (DELTASONE) 20 mg tablet Take 1 tablet (20 mg total) by mouth daily for 5 days. 5 tablet 0     No current facility-administered medications for this visit.      History reviewed. No pertinent past medical history.  Past Surgical History:   Procedure Laterality Date   • CARDIAC SURGERY  01/04/2016    Cardiac bypass X4 vessels   • CARDIAC SURGERY  01/01/1997    2 stents   • CATARACT EXTRACTION Left    • CHOLECYSTECTOMY  02/14/2007   • COLONOSCOPY      Approx 8 years ago with Dr Mcguire- was to have 5 year follow up   • COLONOSCOPY  01/01/2015    Polyps     Family History   Problem Relation Age of Onset   • Heart disease Mother    • Heart attack Father         Myocardial infarction   • Diabetes type II Brother         Toes removed due to diabetes     Social History     Social History   • Marital status:      Spouse name: N/A   • Number of children: N/A   • Years of education: N/A     Social History Main Topics   • Smoking status: Former Smoker     Years: 20.00   • Smokeless tobacco: Never Used   • Alcohol use Yes      Comment: Occasionally   • Drug use: No   • Sexual activity: Not Asked     Other Topics Concern   • None     Social History Narrative   • None       Review of Systems    Objective   /80 (BP Location: Left arm, Patient Position: Sitting, Cuff Size: Reg)   Pulse 66   Temp 37.4 °C  (99.4 °F) (Temporal)   Wt 102 kg (224 lb 9.6 oz)   SpO2 93%   BMI 30.46 kg/m²     Physical Exam   Constitutional: He is oriented to person, place, and time. He appears well-developed and well-nourished. No distress.   HENT:   Head: Normocephalic and atraumatic.   Right Ear: A middle ear effusion is present.   Left Ear: A middle ear effusion is present.   Nose: Rhinorrhea present.   Mouth/Throat: Posterior oropharyngeal erythema present. No posterior oropharyngeal edema.   Eyes: Pupils are equal, round, and reactive to light. Conjunctivae and EOM are normal.   Neck: Normal range of motion. No thyromegaly present.   Cardiovascular: Normal rate, regular rhythm and normal heart sounds.    Pulmonary/Chest: Effort normal and breath sounds normal.   Abdominal: Soft. Bowel sounds are normal.   Musculoskeletal: Normal range of motion.   Neurological: He is alert and oriented to person, place, and time. He has normal reflexes.   Skin: Skin is warm and dry. He is not diaphoretic.   Psychiatric: He has a normal mood and affect. His behavior is normal.   Nursing note and vitals reviewed.      ASSESSMENT AND PLAN   Diagnoses and all orders for this visit:    Acute pharyngitis, unspecified etiology  -     predniSONE (DELTASONE) 20 mg tablet; Take 1 tablet (20 mg total) by mouth daily for 5 days.    Type 2 diabetes mellitus without complication, without long-term current use of insulin (CMS/[facility]) ([facility])    Hyperlipidemia, unspecified hyperlipidemia type    Gastroesophageal reflux disease, esophagitis presence not specified      1.  Will add the prednisone 20mg daily, continue the flonase, singular, and antibiotics.  CXR last time neg for concerns  2.  A1c is 7.4 which is an improvement.  Doing well.  Advised continue to watch diet choices, portion control, and to continue to get regular exercise.  Yearly eye exam  3.  Stable no changes-continue healthy diet and following cardiology  4.  Stable no changes.  Advised to stay on the  dexilant madi with the prednisone    RTC in 4 months for recheck on medical issues however sooner for any changes in status.     A voice recognition program was used to aid in documentation of this record. Sometimes words are not presented exactly as they were spoken.  While efforts were made to carefully edit and correct any inaccuracies, some errors may be present.  Please take this into context.  Please contact the provider if errors are identified.  OBDULIA NOLEN

## 2018-12-05 DIAGNOSIS — E78.5 HYPERLIPIDEMIA, UNSPECIFIED HYPERLIPIDEMIA TYPE: ICD-10-CM

## 2018-12-06 RX ORDER — ATORVASTATIN CALCIUM 80 MG/1
80 TABLET, FILM COATED ORAL DAILY
Qty: 90 TABLET | Refills: 0 | Status: SHIPPED | OUTPATIENT
Start: 2018-12-06 | End: 2019-02-19 | Stop reason: SDUPTHER

## 2019-01-04 ENCOUNTER — COMMUNICATION - HEALTHEAST (OUTPATIENT)
Dept: ADMINISTRATIVE | Facility: CLINIC | Age: 74
End: 2019-01-04

## 2019-01-23 DIAGNOSIS — I10 ESSENTIAL HYPERTENSION: ICD-10-CM

## 2019-01-23 RX ORDER — CLOPIDOGREL BISULFATE 75 MG/1
TABLET ORAL
Qty: 90 TABLET | Refills: 0 | Status: SHIPPED | OUTPATIENT
Start: 2019-01-23 | End: 2019-02-19 | Stop reason: SDUPTHER

## 2019-02-01 DIAGNOSIS — E11.9 DIABETES MELLITUS WITHOUT COMPLICATION (CMS/HCC): Primary | ICD-10-CM

## 2019-02-14 ENCOUNTER — OFFICE VISIT (OUTPATIENT)
Dept: FAMILY MEDICINE | Facility: CLINIC | Age: 74
End: 2019-02-14
Payer: MEDICARE

## 2019-02-14 ENCOUNTER — APPOINTMENT (OUTPATIENT)
Dept: LAB | Facility: CLINIC | Age: 74
End: 2019-02-14
Payer: MEDICARE

## 2019-02-14 VITALS
HEART RATE: 74 BPM | BODY MASS INDEX: 31.15 KG/M2 | SYSTOLIC BLOOD PRESSURE: 138 MMHG | WEIGHT: 230 LBS | HEIGHT: 72 IN | TEMPERATURE: 98.8 F | RESPIRATION RATE: 18 BRPM | DIASTOLIC BLOOD PRESSURE: 88 MMHG | OXYGEN SATURATION: 95 %

## 2019-02-14 DIAGNOSIS — K21.9 GASTROESOPHAGEAL REFLUX DISEASE, ESOPHAGITIS PRESENCE NOT SPECIFIED: ICD-10-CM

## 2019-02-14 DIAGNOSIS — E78.5 HYPERLIPIDEMIA, UNSPECIFIED HYPERLIPIDEMIA TYPE: ICD-10-CM

## 2019-02-14 DIAGNOSIS — E11.9 TYPE 2 DIABETES MELLITUS WITHOUT COMPLICATION, WITHOUT LONG-TERM CURRENT USE OF INSULIN (CMS/HCC): Primary | ICD-10-CM

## 2019-02-14 DIAGNOSIS — E11.8 TYPE 2 DIABETES MELLITUS WITH COMPLICATION, WITHOUT LONG-TERM CURRENT USE OF INSULIN (CMS/HCC): ICD-10-CM

## 2019-02-14 DIAGNOSIS — I10 ESSENTIAL HYPERTENSION: ICD-10-CM

## 2019-02-14 DIAGNOSIS — E11.42 DIABETIC POLYNEUROPATHY ASSOCIATED WITH TYPE 2 DIABETES MELLITUS (CMS/HCC): ICD-10-CM

## 2019-02-14 LAB
ALBUMIN SERPL-MCNC: 5 G/DL (ref 3.5–5.3)
ALP SERPL-CCNC: 51 U/L (ref 45–115)
ALT SERPL-CCNC: 30 U/L (ref 0–52)
ANION GAP SERPL CALC-SCNC: 9 MMOL/L (ref 3–11)
AST SERPL-CCNC: 23 U/L (ref 0–39)
BILIRUB SERPL-MCNC: 1.07 MG/DL (ref 0–1.4)
BUN SERPL-MCNC: 16 MG/DL (ref 7–25)
CALCIUM ALBUM COR SERPL-MCNC: 9.4 MG/DL (ref 8.6–10.3)
CALCIUM SERPL-MCNC: 10.2 MG/DL (ref 8.6–10.3)
CHLORIDE SERPL-SCNC: 101 MMOL/L (ref 98–107)
CO2 SERPL-SCNC: 28 MMOL/L (ref 21–32)
CREAT SERPL-MCNC: 0.9 MG/DL (ref 0.7–1.3)
EST. AVERAGE GLUCOSE BLD GHB EST-MCNC: 185.8 MG/DL
GFR SERPL CREATININE-BSD FRML MDRD: 84 ML/MIN/1.73M*2
GLUCOSE SERPL-MCNC: 169 MG/DL (ref 70–105)
HBA1C MFR BLD: 8.1 % (ref 4.8–6)
POTASSIUM SERPL-SCNC: 4.5 MMOL/L (ref 3.5–5.1)
PROT SERPL-MCNC: 7.5 G/DL (ref 6–8.3)
SODIUM SERPL-SCNC: 138 MMOL/L (ref 135–145)

## 2019-02-14 PROCEDURE — 83036 HEMOGLOBIN GLYCOSYLATED A1C: CPT

## 2019-02-14 PROCEDURE — 36415 COLL VENOUS BLD VENIPUNCTURE: CPT

## 2019-02-14 PROCEDURE — 80053 COMPREHEN METABOLIC PANEL: CPT

## 2019-02-14 PROCEDURE — G0463 HOSPITAL OUTPT CLINIC VISIT: HCPCS

## 2019-02-14 PROCEDURE — 99214 OFFICE O/P EST MOD 30 MIN: CPT | Mod: GF | Performed by: PHYSICIAN ASSISTANT

## 2019-02-14 NOTE — PROGRESS NOTES
Subjective      Abhilash Huggins is a 74 y.o. male who presents for recheck on multiple medical concerns.     HPI  Presents to the clinic for recheck of multiple medical concerns.  He did get labs for his diabetes and cholesterol.  He will be seeing cardiology soon as he in need of any another stent placement.  He admits his diet has not been as good as in the winter months and activity is a little bit less.  He does not check his sugars on a regular basis.  He reports acid reflux is controlled with the current medicine.  He does need refills of his atorvastatin, Metformin, and plavix.  Pt denies any fevers, chills, sweats.  No ear pain, sore throat, or cough.  No chest pain, chest tightness, or shortness of breath.  No nausea or vomiting.  No urinary symptoms.  Sleeping fine.  Stable moods.  Review of systems otherwise negative      The following have been reviewed and updated as appropriate in this visit:  Allergies         Allergies   Allergen Reactions   • Amoxicillin      HIVES, NAUSEA   • Penicillins      HIVES, NAUSEA     Current Outpatient Medications   Medication Sig Dispense Refill   • aspirin 81 mg EC tablet Take 81 mg by mouth daily.     • atorvastatin (LIPITOR) 80 mg tablet Take 1 tablet (80 mg total) by mouth daily 90 tablet 3   • canagliflozin (INVOKANA) 300 mg tablet Take 1 tablet by mouth daily. (Patient taking differently: Indications: Patient will switch to Jardiance when runs out of Invokana Take 1 tablet by mouth daily. ) 30 tablet 6   • clopidogrel (PLAVIX) 75 mg tablet Take 1 tablet (75 mg total) by mouth daily 90 tablet 3   • DEXILANT 60 mg capsule TAKE ONE CAPSULE BY MOUTH EVERY DAY 30 capsule 8   • lisinopril (PRINIVIL,ZESTRIL) 5 mg tablet Take 1 tablet (5 mg total) by mouth daily. 90 tablet 3   • metoprolol tartrate (LOPRESSOR) 25 mg tablet Take 1 tablet (25 mg total) by mouth 2 (two) times a day 180 tablet 3   • ranitidine (ZANTAC) 150 mg tablet one to two tablets by mouth at night as needed  90 0   • metFORMIN (GLUCOPHAGE) 500 mg tablet Take 2 tablets (1,000 mg total) by mouth 2 (two) times a day with meals 360 tablet 3     No current facility-administered medications for this visit.      History reviewed. No pertinent past medical history.  Past Surgical History:   Procedure Laterality Date   • CARDIAC SURGERY  01/04/2016    Cardiac bypass X4 vessels   • CARDIAC SURGERY  01/01/1997    2 stents   • CATARACT EXTRACTION Left    • CHOLECYSTECTOMY  02/14/2007   • COLONOSCOPY      Approx 8 years ago with Dr Mcguire- was to have 5 year follow up   • COLONOSCOPY  01/01/2015    Polyps     Family History   Problem Relation Age of Onset   • Heart disease Mother    • Heart attack Father         Myocardial infarction   • Diabetes type II Brother         Toes removed due to diabetes     Social History     Socioeconomic History   • Marital status:      Spouse name: None   • Number of children: None   • Years of education: None   • Highest education level: None   Social Needs   • Financial resource strain: None   • Food insecurity - worry: None   • Food insecurity - inability: None   • Transportation needs - medical: None   • Transportation needs - non-medical: None   Occupational History   • None   Tobacco Use   • Smoking status: Former Smoker     Years: 20.00   • Smokeless tobacco: Never Used   Substance and Sexual Activity   • Alcohol use: Yes     Comment: Occasionally   • Drug use: No   • Sexual activity: None   Other Topics Concern   • None   Social History Narrative   • None       Review of Systems    Objective   /88 (BP Location: Right arm, Patient Position: Sitting, Cuff Size: Reg)   Pulse 74   Temp 37.1 °C (98.8 °F) (Temporal)   Resp 18   Ht 1.829 m (6')   Wt 104 kg (230 lb)   SpO2 95%   BMI 31.19 kg/m²     Physical Exam   Constitutional: He is oriented to person, place, and time. He appears well-developed and well-nourished. No distress.   HENT:   Head: Normocephalic and atraumatic.    Nose: Nose normal.   Mouth/Throat: Oropharynx is clear and moist. No oropharyngeal exudate.   Eyes: Pupils are equal, round, and reactive to light. Conjunctivae are normal.   Neck: No thyromegaly present.   Cardiovascular: Normal rate, regular rhythm and normal heart sounds.   Pulmonary/Chest: Effort normal and breath sounds normal.   Abdominal: Soft. Bowel sounds are normal.   Musculoskeletal: Normal range of motion.   Neurological: He is alert and oriented to person, place, and time. He has normal reflexes.   Skin: Skin is warm and dry. He is not diaphoretic.   Psychiatric: He has a normal mood and affect. His behavior is normal.   Nursing note and vitals reviewed.      ASSESSMENT AND PLAN   Diagnoses and all orders for this visit:    Type 2 diabetes mellitus without complication, without long-term current use of insulin (CMS/[facility]) ([facility])  -     metFORMIN (GLUCOPHAGE) 500 mg tablet; Take 2 tablets (1,000 mg total) by mouth 2 (two) times a day with meals    Hyperlipidemia, unspecified hyperlipidemia type  -     atorvastatin (LIPITOR) 80 mg tablet; Take 1 tablet (80 mg total) by mouth daily    Essential hypertension  -     clopidogrel (PLAVIX) 75 mg tablet; Take 1 tablet (75 mg total) by mouth daily  -     metoprolol tartrate (LOPRESSOR) 25 mg tablet; Take 1 tablet (25 mg total) by mouth 2 (two) times a day    Diabetic polyneuropathy associated with type 2 diabetes mellitus (CMS/[facility]) ([facility])    Gastroesophageal reflux disease, esophagitis presence not specified    Overall patient is doing well.  His A1c has crept up a little bit-- he is aware of this and is going adjust his diet and get  more active again.  He declines adding more medications today.  Was encouraged to check some sugars times time to give us a better idea of what sugars are reading.  Patient was given refills on all medications today.  He continues on the atorvastatin and tolerating this well do high encouraged him to get back to the cardiologist to  get the stent placement.  He agrees he will get this done with appointment in the next couple weeks. Declines medications for chronic issues in feet has tried and failed lyrica and Neurontin.  For now OTC topical meds are working.   We will see him back in 3 months sooner for any changes in status.  At that time he needs a repeat A1c and CMP.    Total time face to face spent with patient was 25 minutes with 50% or more in counseling and coordination of care regarding ADA Standards of Care, diabetes management, labs, medications, medication side effect, hyper/hypoglycemia, complications for diabetes, hypertension and hyperlipidemia.      OBDULIA NOLEN

## 2019-02-19 RX ORDER — METOPROLOL TARTRATE 25 MG/1
25 TABLET, FILM COATED ORAL 2 TIMES DAILY
Qty: 180 TABLET | Refills: 3 | Status: SHIPPED | OUTPATIENT
Start: 2019-02-19 | End: 2020-03-30 | Stop reason: SDUPTHER

## 2019-02-19 RX ORDER — ATORVASTATIN CALCIUM 80 MG/1
80 TABLET, FILM COATED ORAL DAILY
Qty: 90 TABLET | Refills: 3 | Status: SHIPPED | OUTPATIENT
Start: 2019-02-19 | End: 2020-03-30 | Stop reason: SDUPTHER

## 2019-02-19 RX ORDER — CLOPIDOGREL BISULFATE 75 MG/1
75 TABLET ORAL DAILY
Qty: 90 TABLET | Refills: 3 | Status: SHIPPED | OUTPATIENT
Start: 2019-02-19 | End: 2020-02-12 | Stop reason: SDUPTHER

## 2019-02-19 RX ORDER — METFORMIN HYDROCHLORIDE 500 MG/1
1000 TABLET ORAL 2 TIMES DAILY WITH MEALS
Qty: 360 TABLET | Refills: 3 | Status: SHIPPED | OUTPATIENT
Start: 2019-02-19 | End: 2020-02-19 | Stop reason: WASHOUT

## 2019-07-22 DIAGNOSIS — K21.9 GASTROESOPHAGEAL REFLUX DISEASE, ESOPHAGITIS PRESENCE NOT SPECIFIED: ICD-10-CM

## 2019-07-22 DIAGNOSIS — I10 ESSENTIAL HYPERTENSION: ICD-10-CM

## 2019-07-22 RX ORDER — DEXLANSOPRAZOLE 60 MG/1
CAPSULE, DELAYED RELEASE ORAL
Qty: 30 CAPSULE | Refills: 8 | Status: SHIPPED | OUTPATIENT
Start: 2019-07-22 | End: 2019-08-19 | Stop reason: SDUPTHER

## 2019-07-22 RX ORDER — LISINOPRIL 5 MG/1
TABLET ORAL
Qty: 90 TABLET | Refills: 2 | Status: SHIPPED | OUTPATIENT
Start: 2019-07-22 | End: 2019-08-17 | Stop reason: SDUPTHER

## 2019-07-29 DIAGNOSIS — K21.9 GASTROESOPHAGEAL REFLUX DISEASE, ESOPHAGITIS PRESENCE NOT SPECIFIED: ICD-10-CM

## 2019-07-29 DIAGNOSIS — I10 ESSENTIAL HYPERTENSION: ICD-10-CM

## 2019-07-29 RX ORDER — LISINOPRIL 5 MG/1
TABLET ORAL
Qty: 90 TABLET | Refills: 0 | OUTPATIENT
Start: 2019-07-29

## 2019-07-29 RX ORDER — DEXLANSOPRAZOLE 60 MG/1
CAPSULE, DELAYED RELEASE ORAL
Qty: 30 CAPSULE | Refills: 0 | OUTPATIENT
Start: 2019-07-29

## 2019-08-01 DIAGNOSIS — I10 ESSENTIAL HYPERTENSION: ICD-10-CM

## 2019-08-01 DIAGNOSIS — K21.9 GASTROESOPHAGEAL REFLUX DISEASE, ESOPHAGITIS PRESENCE NOT SPECIFIED: ICD-10-CM

## 2019-08-02 RX ORDER — LISINOPRIL 5 MG/1
5 TABLET ORAL
OUTPATIENT
Start: 2019-08-02

## 2019-08-02 RX ORDER — DEXLANSOPRAZOLE 60 MG/1
1 CAPSULE, DELAYED RELEASE ORAL
OUTPATIENT
Start: 2019-08-02

## 2019-08-17 DIAGNOSIS — I10 ESSENTIAL HYPERTENSION: ICD-10-CM

## 2019-08-19 DIAGNOSIS — K21.9 GASTROESOPHAGEAL REFLUX DISEASE, ESOPHAGITIS PRESENCE NOT SPECIFIED: ICD-10-CM

## 2019-08-19 DIAGNOSIS — I10 ESSENTIAL HYPERTENSION: ICD-10-CM

## 2019-08-19 RX ORDER — LISINOPRIL 5 MG/1
TABLET ORAL
Qty: 90 TABLET | Refills: 2 | Status: SHIPPED | OUTPATIENT
Start: 2019-08-19 | End: 2019-09-24 | Stop reason: SDUPTHER

## 2019-08-19 RX ORDER — LISINOPRIL 5 MG/1
5 TABLET ORAL
OUTPATIENT
Start: 2019-08-19

## 2019-08-19 RX ORDER — DEXLANSOPRAZOLE 60 MG/1
1 CAPSULE, DELAYED RELEASE ORAL DAILY
Qty: 90 CAPSULE | Refills: 2 | Status: SHIPPED | OUTPATIENT
Start: 2019-08-19 | End: 2019-09-24 | Stop reason: SDUPTHER

## 2019-09-24 ENCOUNTER — OFFICE VISIT (OUTPATIENT)
Dept: FAMILY MEDICINE | Facility: CLINIC | Age: 74
End: 2019-09-24
Payer: MEDICARE

## 2019-09-24 ENCOUNTER — APPOINTMENT (OUTPATIENT)
Dept: LAB | Facility: CLINIC | Age: 74
End: 2019-09-24
Payer: MEDICARE

## 2019-09-24 VITALS
WEIGHT: 225 LBS | SYSTOLIC BLOOD PRESSURE: 145 MMHG | DIASTOLIC BLOOD PRESSURE: 77 MMHG | BODY MASS INDEX: 30.48 KG/M2 | HEIGHT: 72 IN | OXYGEN SATURATION: 96 % | TEMPERATURE: 98.2 F | HEART RATE: 81 BPM

## 2019-09-24 DIAGNOSIS — Z12.5 SCREENING PSA (PROSTATE SPECIFIC ANTIGEN): ICD-10-CM

## 2019-09-24 DIAGNOSIS — K59.00 CONSTIPATION, UNSPECIFIED CONSTIPATION TYPE: ICD-10-CM

## 2019-09-24 DIAGNOSIS — R39.9 LOWER URINARY TRACT SYMPTOMS (LUTS): ICD-10-CM

## 2019-09-24 DIAGNOSIS — E11.9 TYPE 2 DIABETES MELLITUS WITHOUT COMPLICATION, WITHOUT LONG-TERM CURRENT USE OF INSULIN (CMS/HCC): ICD-10-CM

## 2019-09-24 DIAGNOSIS — R21 RASH OF FACE: ICD-10-CM

## 2019-09-24 DIAGNOSIS — K21.9 GASTROESOPHAGEAL REFLUX DISEASE, ESOPHAGITIS PRESENCE NOT SPECIFIED: ICD-10-CM

## 2019-09-24 DIAGNOSIS — I10 ESSENTIAL HYPERTENSION: Primary | ICD-10-CM

## 2019-09-24 LAB
ALBUMIN SERPL-MCNC: 4.2 G/DL (ref 3.4–5)
ALP SERPL-CCNC: 55 U/L (ref 45–115)
ALT SERPL-CCNC: 38 U/L (ref 0–77)
ANION GAP SERPL CALC-SCNC: 8 MMOL/L (ref 3–11)
AST SERPL-CCNC: 41 U/L (ref 0–37)
BILIRUB SERPL-MCNC: 1.2 MG/DL (ref 0–1.4)
BUN SERPL-MCNC: 14 MG/DL (ref 7–25)
CALCIUM ALBUM COR SERPL-MCNC: 9.6 MG/DL (ref 8.6–10.3)
CALCIUM SERPL-MCNC: 9.8 MG/DL (ref 8.6–10.3)
CHLORIDE SERPL-SCNC: 102 MMOL/L (ref 98–107)
CO2 SERPL-SCNC: 29 MMOL/L (ref 21–32)
CREAT SERPL-MCNC: 0.8 MG/DL (ref 0.7–1.3)
EST. AVERAGE GLUCOSE BLD GHB EST-MCNC: 142.7 MG/DL
GFR SERPL CREATININE-BSD FRML MDRD: 88 ML/MIN/1.73M*2
GLUCOSE SERPL-MCNC: 148 MG/DL (ref 70–105)
HBA1C MFR BLD: 6.6 % (ref 4–6)
POTASSIUM SERPL-SCNC: 4.8 MMOL/L (ref 3.6–5)
PROT SERPL-MCNC: 7.5 G/DL (ref 6.3–8.6)
SODIUM SERPL-SCNC: 139 MMOL/L (ref 135–145)

## 2019-09-24 PROCEDURE — 83036 HEMOGLOBIN GLYCOSYLATED A1C: CPT | Performed by: FAMILY MEDICINE

## 2019-09-24 PROCEDURE — 80053 COMPREHEN METABOLIC PANEL: CPT | Performed by: FAMILY MEDICINE

## 2019-09-24 PROCEDURE — 36415 COLL VENOUS BLD VENIPUNCTURE: CPT | Performed by: FAMILY MEDICINE

## 2019-09-24 PROCEDURE — 99214 OFFICE O/P EST MOD 30 MIN: CPT | Performed by: FAMILY MEDICINE

## 2019-09-24 PROCEDURE — 84153 ASSAY OF PSA TOTAL: CPT | Performed by: FAMILY MEDICINE

## 2019-09-24 RX ORDER — LISINOPRIL 5 MG/1
5 TABLET ORAL DAILY
Qty: 90 TABLET | Refills: 3 | Status: SHIPPED | OUTPATIENT
Start: 2019-09-24 | End: 2020-07-15 | Stop reason: SDUPTHER

## 2019-09-24 RX ORDER — ISOSORBIDE MONONITRATE 30 MG/1
30 TABLET, EXTENDED RELEASE ORAL DAILY
COMMUNITY
End: 2022-09-22 | Stop reason: ALTCHOICE

## 2019-09-24 RX ORDER — TAMSULOSIN HYDROCHLORIDE 0.4 MG/1
0.4 CAPSULE ORAL
Qty: 30 CAPSULE | Refills: 2 | Status: SHIPPED | OUTPATIENT
Start: 2019-09-24 | End: 2019-11-23 | Stop reason: WASHOUT

## 2019-09-24 RX ORDER — DEXLANSOPRAZOLE 60 MG/1
1 CAPSULE, DELAYED RELEASE ORAL DAILY
Qty: 90 CAPSULE | Refills: 3 | Status: SHIPPED | OUTPATIENT
Start: 2019-09-24 | End: 2020-09-23

## 2019-09-24 ASSESSMENT — ENCOUNTER SYMPTOMS
COUGH: 0
SEIZURES: 0
COLOR CHANGE: 0
PALPITATIONS: 0
SORE THROAT: 0
SHORTNESS OF BREATH: 0
BACK PAIN: 0
CHEST TIGHTNESS: 0
ACTIVITY CHANGE: 0
ABDOMINAL PAIN: 0
UNEXPECTED WEIGHT CHANGE: 0
CONSTIPATION: 1
FATIGUE: 0
HEMATURIA: 0
FREQUENCY: 1
DYSURIA: 0
VOMITING: 0
CHILLS: 0
FEVER: 0
ARTHRALGIAS: 0
EYE PAIN: 0

## 2019-09-24 ASSESSMENT — PAIN SCALES - GENERAL: PAINLEVEL: 0-NO PAIN

## 2019-09-24 NOTE — PATIENT INSTRUCTIONS
Patient Education     Diabetes Basics    Diabetes (diabetes mellitus) is a long-term (chronic) disease. It occurs when the body does not properly use sugar (glucose) that is released from food after you eat.  Diabetes may be caused by one or both of these problems:  · Your pancreas does not make enough of a hormone called insulin.  · Your body does not react in a normal way to insulin that it makes.  Insulin lets sugars (glucose) go into cells in your body. This gives you energy. If you have diabetes, sugars cannot get into cells. This causes high blood sugar (hyperglycemia).  Follow these instructions at home:  How is Diabetes treated?  You may need to take insulin or other diabetes medicines daily to keep your blood sugar in balance. Take your diabetes medicines every day as told by your doctor. List your diabetes medicines here:  Diabetes medicines  · Name of medicine: ______________________________  ? Amount (dose): _______________ Time (a.m./p.m.): _______________ Notes: ___________________________________  · Name of medicine: ______________________________  ? Amount (dose): _______________ Time (a.m./p.m.): _______________ Notes: ___________________________________  · Name of medicine: ______________________________  ? Amount (dose): _______________ Time (a.m./p.m.): _______________ Notes: ___________________________________  If you use insulin, you will learn how to give yourself insulin by injection. You may need to adjust the amount based on the food that you eat. List the types of insulin you use here:  Insulin  · Insulin type: ______________________________  ? Amount (dose): _______________ Time (a.m./p.m.): _______________ Notes: ___________________________________  · Insulin type: ______________________________  ? Amount (dose): _______________ Time (a.m./p.m.): _______________ Notes: ___________________________________  · Insulin type: ______________________________  ? Amount (dose): _______________  Time (a.m./p.m.): _______________ Notes: ___________________________________  · Insulin type: ______________________________  ? Amount (dose): _______________ Time (a.m./p.m.): _______________ Notes: ___________________________________  · Insulin type: ______________________________  ? Amount (dose): _______________ Time (a.m./p.m.): _______________ Notes: ___________________________________  How do I manage my blood sugar?    Check your blood sugar levels using a blood glucose monitor as directed by your doctor.  Your doctor will set treatment goals for you. Generally, you should have these blood sugar levels:  · Before meals (preprandial):  mg/dL (4.4-7.2 mmol/L).  · After meals (postprandial): below 180 mg/dL (10 mmol/L).  · A1c level: less than 7%.  Write down the times that you will check your blood sugar levels:  Blood sugar checks  · Time: _______________ Notes: ___________________________________  · Time: _______________ Notes: ___________________________________  · Time: _______________ Notes: ___________________________________  · Time: _______________ Notes: ___________________________________  · Time: _______________ Notes: ___________________________________  · Time: _______________ Notes: ___________________________________    What do I need to know about low blood sugar?  Low blood sugar is called hypoglycemia. This is when blood sugar is at or below 70 mg/dL (3.9 mmol/L). Symptoms may include:  · Feeling:  ? Hungry.  ? Worried or nervous (anxious).  ? Sweaty and clammy.  ? Confused.  ? Dizzy.  ? Sleepy.  ? Sick to your stomach (nauseous).  · Having:  ? A fast heartbeat.  ? A headache.  ? A change in your vision.  ? Tingling or no feeling (numbness) around the mouth, lips, or tongue.  ? Jerky movements that you cannot control (seizure).  · Having trouble with:  ? Moving (coordination).  ? Sleeping.  ? Passing out (fainting).  ? Getting upset easily (irritability).  Treating low blood sugar  To  treat low blood sugar, eat or drink something sugary right away. If you can think clearly and swallow safely, follow the 15:15 rule:  · Take 15 grams of a fast-acting carb (carbohydrate). Some fast-acting carbs are:  ? 1 tube of glucose gel.  ? 3 sugar tablets (glucose pills).  ? 6-8 pieces of hard candy.  ? 4 oz (120 mL) of fruit juice.  ? 4 oz (120 mL) of regular (not diet) soda.  · Check your blood sugar 15 minutes after you take the carb.  · If your blood sugar is still at or below 70 mg/dL (3.9 mmol/L), take 15 grams of a carb again.  · If your blood sugar does not go above 70 mg/dL (3.9 mmol/L) after 3 tries, get help right away.  · After your blood sugar goes back to normal, eat a meal or a snack within 1 hour.  Treating very low blood sugar  If your blood sugar is at or below 54 mg/dL (3 mmol/L), you have very low blood sugar (severe hypoglycemia). This is an emergency. Do not wait to see if the symptoms will go away. Get medical help right away. Call your local emergency services (911 in the [place] Do not drive yourself to the hospital.  Questions to ask your health care provider  · Do I need to meet with a diabetes educator?  · What equipment will I need to care for myself at home?  · What diabetes medicines do I need? When should I take them?  · How often do I need to check my blood sugar?  · What number can I call if I have questions?  · When is my next doctor's visit?  · Where can I find a support group for people with diabetes?  Where to find more information  · American Diabetes Association: www.diabetes.org  · American Association of Diabetes Educators: www.diabeteseducator.org/patient-resources  Contact a doctor if:  · Your blood sugar is at or above 240 mg/dL (13.3 mmol/L) for 2 days in a row.  · You have been sick or have had a fever for 2 days or more, and you are not getting better.  · You have any of these problems for more than 6 hours:  ? You cannot eat or drink.  ? You feel sick to your  stomach (nauseous).  ? You throw up (vomit).  ? You have watery poop (diarrhea).  Get help right away if:  · Your blood sugar is lower than 54 mg/dL (3 mmol/L).  · You get confused.  · You have trouble:  ? Thinking clearly.  ? Breathing.  Summary  · Diabetes (diabetes mellitus) is a long-term (chronic) disease. It occurs when the body does not properly use sugar (glucose) that is released from food after digestion.  · Take insulin and diabetes medicines as told.  · Check your blood sugar every day, as often as told.  · Keep all follow-up visits as told by your doctor. This is important.  This information is not intended to replace advice given to you by your health care provider. Make sure you discuss any questions you have with your health care provider.  Document Released: 03/22/2019 Document Revised: 03/22/2019 Document Reviewed: 03/22/2019  [employer] Interactive Patient Education © 2019 [employer]

## 2019-09-24 NOTE — PROGRESS NOTES
Jed Huggins is a 74 y.o. male who presents for diabetes recheck, refill of his lisinopril and stomach medication.  His last A1c was 8.1, since he has been taking his Jardiance one half a tab daily versus the full tablet and he has been taking half of his evening dose of metformin.  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  He has plans to see his eye doctor tomorrow.    He also mentions a rash over his forehead, he states initially it did blister, but now seems to be drying out.  It does itch on occasion, no associated pain.    He also inquires about a different over-the-counter stool softener, he is currently using senna but would like to try something different.  He does drink a good amount of water each day per Abhilash.    He also mentions nighttime awakenings to go to the bathroom, about 4 times per night on average.  In the last few months he has noticed some urgency when having to go to the bathroom.  He does have a baseline PSA, last done in October of last year.    HPI    The following have been reviewed and updated as appropriate in this visit:         Allergies   Allergen Reactions   • Amoxicillin      HIVES, NAUSEA   • Penicillins      HIVES, NAUSEA     Current Outpatient Medications   Medication Sig Dispense Refill   • empagliflozin (Jardiance) 25 mg tablet Take by mouth daily Indications: patient takes 1/2 tab daily     • isosorbide mononitrate (IMDUR) 30 mg 24 hr tablet Take 30 mg by mouth daily Indications: patient only takes as needed if having pain     • atorvastatin (LIPITOR) 80 mg tablet Take 1 tablet (80 mg total) by mouth daily 90 tablet 3   • clopidogrel (PLAVIX) 75 mg tablet Take 1 tablet (75 mg total) by mouth daily 90 tablet 3   • metoprolol tartrate (LOPRESSOR) 25 mg tablet Take 1 tablet (25 mg total) by mouth 2 (two) times a day 180 tablet 3   • metFORMIN (GLUCOPHAGE) 500 mg tablet Take 2 tablets (1,000 mg total) by mouth  2 (two) times a day with meals (Patient taking differently: Take 1,000 mg by mouth 2 (two) times a day with meals Indications: patients 1000mg in am and 500mg in pm  ) 360 tablet 3   • aspirin 81 mg EC tablet Take 81 mg by mouth daily.     • lisinopril (PRINIVIL,ZESTRIL) 5 mg tablet Take 1 tablet (5 mg total) by mouth daily 90 tablet 3   • dexlansoprazole (Dexilant) 60 mg capsule Take 1 capsule (60 mg total) by mouth daily 90 capsule 3   • tamsulosin (FLOMAX) 0.4 mg capsule Take 1 capsule (0.4 mg total) by mouth daily with dinner 30 capsule 2   • ranitidine (ZANTAC) 150 mg tablet one to two tablets by mouth at night as needed 90 0     No current facility-administered medications for this visit.      Past Medical History:   Diagnosis Date   • Diabetes mellitus (CMS/HCC) (HCC)    • Hypertension      Past Surgical History:   Procedure Laterality Date   • CARDIAC SURGERY  01/04/2016    Cardiac bypass X4 vessels   • CARDIAC SURGERY  01/01/1997    2 stents   • CATARACT EXTRACTION Left    • CHOLECYSTECTOMY  02/14/2007   • COLONOSCOPY      Approx 8 years ago with Dr Mcguire- was to have 5 year follow up   • COLONOSCOPY  01/01/2015    Polyps     Family History   Problem Relation Age of Onset   • Heart disease Mother    • Heart attack Father         Myocardial infarction   • Diabetes type II Brother         Toes removed due to diabetes     Social History     Occupational History   • Not on file   Tobacco Use   • Smoking status: Former Smoker     Packs/day: 0.00     Years: 20.00     Pack years: 0.00   • Smokeless tobacco: Never Used   Substance and Sexual Activity   • Alcohol use: Yes     Comment: Occasionally   • Drug use: No   • Sexual activity: Not on file   Social History Narrative   • Not on file       Review of Systems   Constitutional: Negative for activity change, chills, fatigue, fever and unexpected weight change.   HENT: Negative for ear pain, mouth sores and sore throat.    Eyes: Negative for pain and visual  disturbance.   Respiratory: Negative for cough, chest tightness and shortness of breath.    Cardiovascular: Negative for chest pain, palpitations and leg swelling.   Gastrointestinal: Positive for constipation (intermittent). Negative for abdominal pain and vomiting.   Genitourinary: Positive for frequency (night time). Negative for dysuria, hematuria and urgency.   Musculoskeletal: Negative for arthralgias and back pain.   Skin: Positive for rash. Negative for color change.   Neurological: Negative for seizures and syncope.   All other systems reviewed and are negative.    As noted in HPI    Objective   /77 (BP Location: Right arm, Patient Position: Sitting, Cuff Size: Reg)   Pulse 81   Temp 36.8 °C (98.2 °F) (Temporal)   Ht 1.829 m (6')   Wt 102 kg (225 lb)   SpO2 96%   BMI 30.52 kg/m²     Physical Exam  Constitutional:       General: He is not in acute distress.     Appearance: Normal appearance. He is not ill-appearing.   HENT:      Head: Normocephalic.      Mouth/Throat:      Mouth: Mucous membranes are moist.      Pharynx: Oropharynx is clear. No oropharyngeal exudate or posterior oropharyngeal erythema.   Eyes:      Conjunctiva/sclera: Conjunctivae normal.   Neck:      Musculoskeletal: Normal range of motion and neck supple. No muscular tenderness.   Cardiovascular:      Rate and Rhythm: Normal rate and regular rhythm.      Pulses: Normal pulses.      Heart sounds: Normal heart sounds.   Pulmonary:      Effort: Pulmonary effort is normal. No respiratory distress.      Breath sounds: Normal breath sounds. No wheezing, rhonchi or rales.   Abdominal:      General: Bowel sounds are normal.      Palpations: Abdomen is soft.      Tenderness: There is no tenderness.   Musculoskeletal:         General: No swelling or tenderness.   Skin:     General: Skin is warm and dry.      Findings: Rash (faint erythematous dry macular papular rash over frontal region bilaterally. No blisters , drainage, or ulceration)  present.   Neurological:      General: No focal deficit present.      Mental Status: He is alert and oriented to person, place, and time. Mental status is at baseline.   Psychiatric:         Mood and Affect: Mood normal.         Behavior: Behavior normal.         Judgment: Judgment normal.         ASSESSMENT AND PLAN   Diagnoses and all orders for this visit:    Essential hypertension  -     Comprehensive metabolic panel Blood, Venous  -     lisinopril (PRINIVIL,ZESTRIL) 5 mg tablet; Take 1 tablet (5 mg total) by mouth daily    Constipation, unspecified constipation type    Type 2 diabetes mellitus without complication, without long-term current use of insulin (CMS/[facility]) ([facility])  -     Hemoglobin A1c (glycosylated) Blood, Venous    Screening PSA (prostate specific antigen)  -     PSA screen Blood, Venous    Gastroesophageal reflux disease, esophagitis presence not specified  -     dexlansoprazole (Dexilant) 60 mg capsule; Take 1 capsule (60 mg total) by mouth daily    Rash of face    Lower urinary tract symptoms (LUTS)  -     tamsulosin (FLOMAX) 0.4 mg capsule; Take 1 capsule (0.4 mg total) by mouth daily with dinner    Hypertension  -Goal blood pressure of less than 130/80.  Patient has been off his lisinopril for 1 month now we will restart this along with his other antihypertensive medications.  He did mention some dry mouth, I did inform him this is likely a side effect of the beta-blocker medication and to discuss it with his cardiologist at the next visit.  Comprehensive metabolic panel today for drug monitoring.    History of constipation  Encourage continue daily water intake, fiber in his diet, and use of over-the-counter Dulcolax stool softener.    LUTS  -Trial Flomax daily.  -Screening PSA today we will notify him of results    History of reflux, well controlled with Dexilant will continue with no dose change    Rash, resolving, may use over-the-counter cortisone cream as needed for itch.  He is to let us  know if rash returns or does not resolve.      Follow-up in 3 months      Karli Brito MD

## 2019-09-25 LAB — PSA SERPL-MCNC: 1.29 NG/ML (ref 0–4)

## 2019-10-10 ENCOUNTER — TELEPHONE (OUTPATIENT)
Dept: FAMILY MEDICINE | Facility: CLINIC | Age: 74
End: 2019-10-10

## 2019-10-10 DIAGNOSIS — E11.9 TYPE 2 DIABETES MELLITUS WITHOUT COMPLICATION, WITHOUT LONG-TERM CURRENT USE OF INSULIN (CMS/HCC): Primary | ICD-10-CM

## 2019-10-10 NOTE — TELEPHONE ENCOUNTER
Abhilash is changing pharmacy's from [facility] to [facility] & is needing a New RX for his Jardiance sent in.

## 2020-02-29 DIAGNOSIS — E11.9 TYPE 2 DIABETES MELLITUS WITHOUT COMPLICATION, WITHOUT LONG-TERM CURRENT USE OF INSULIN (CMS/HCC): Primary | ICD-10-CM

## 2020-03-02 RX ORDER — METFORMIN HYDROCHLORIDE 500 MG/1
1000 TABLET ORAL 2 TIMES DAILY WITH MEALS
Qty: 120 TABLET | Refills: 0 | Status: SHIPPED | OUTPATIENT
Start: 2020-03-02 | End: 2020-03-30 | Stop reason: SDUPTHER

## 2020-03-30 ENCOUNTER — TELEPHONE - BILLABLE (OUTPATIENT)
Dept: FAMILY MEDICINE | Facility: CLINIC | Age: 75
End: 2020-03-30
Payer: MEDICARE

## 2020-03-30 DIAGNOSIS — E11.9 TYPE 2 DIABETES MELLITUS WITHOUT COMPLICATION, WITHOUT LONG-TERM CURRENT USE OF INSULIN (CMS/HCC): Primary | ICD-10-CM

## 2020-03-30 DIAGNOSIS — I10 ESSENTIAL HYPERTENSION: ICD-10-CM

## 2020-03-30 DIAGNOSIS — E78.5 HYPERLIPIDEMIA, UNSPECIFIED HYPERLIPIDEMIA TYPE: ICD-10-CM

## 2020-03-30 DIAGNOSIS — I25.118 CORONARY ARTERY DISEASE OF NATIVE ARTERY OF NATIVE HEART WITH STABLE ANGINA PECTORIS (CMS/HCC): ICD-10-CM

## 2020-03-30 PROCEDURE — 99441 *INACTIVE DO NOT USE* PR PHYS/QHP TELEPHONE EVALUATION 5-10 MIN: CPT | Mod: RVUONLY | Performed by: FAMILY MEDICINE

## 2020-03-30 PROCEDURE — G2025 DIS SITE TELE SVCS RHC/FQHC: HCPCS | Mod: 95 | Performed by: FAMILY MEDICINE

## 2020-03-30 RX ORDER — CLOPIDOGREL BISULFATE 75 MG/1
75 TABLET ORAL DAILY
Qty: 90 TABLET | Refills: 0 | Status: SHIPPED | OUTPATIENT
Start: 2020-03-30 | End: 2020-08-05 | Stop reason: SDUPTHER

## 2020-03-30 RX ORDER — METOPROLOL TARTRATE 25 MG/1
25 TABLET, FILM COATED ORAL 2 TIMES DAILY
Qty: 180 TABLET | Refills: 0 | Status: SHIPPED | OUTPATIENT
Start: 2020-03-30 | End: 2020-06-30

## 2020-03-30 RX ORDER — EMPAGLIFLOZIN 25 MG/1
0.5 TABLET, FILM COATED ORAL DAILY
Qty: 45 TABLET | Refills: 0 | Status: SHIPPED | OUTPATIENT
Start: 2020-03-30 | End: 2020-06-30

## 2020-03-30 RX ORDER — ATORVASTATIN CALCIUM 80 MG/1
80 TABLET, FILM COATED ORAL DAILY
Qty: 90 TABLET | Refills: 0 | Status: SHIPPED | OUTPATIENT
Start: 2020-03-30 | End: 2020-06-30

## 2020-03-30 RX ORDER — METFORMIN HYDROCHLORIDE 500 MG/1
1000 TABLET ORAL 2 TIMES DAILY WITH MEALS
Qty: 120 TABLET | Refills: 0 | Status: SHIPPED | OUTPATIENT
Start: 2020-03-30 | End: 2020-06-01

## 2020-03-30 ASSESSMENT — ENCOUNTER SYMPTOMS
COUGH: 0
NAUSEA: 0
SHORTNESS OF BREATH: 0
POLYDIPSIA: 0
DIFFICULTY URINATING: 0
LIGHT-HEADEDNESS: 0
FATIGUE: 0
PALPITATIONS: 0
UNEXPECTED WEIGHT CHANGE: 0
BRUISES/BLEEDS EASILY: 0

## 2020-03-30 NOTE — PROGRESS NOTES
Subjective   Per discussion with Karli Brito MD, Abhilash Huggins has verbally consented to be treated via a telephone based visit: Yes. A total of 10 minutes were required for this telephone based visit.     HPI  Abhilash Huggins is a 75 y.o. male who is needed updated refills on his medications and follow up of his diabetes. Last A1c was in Sept and 6.6. He takes Metformin twice and day and daily Jardiance. He is out of both medications. He denies any SE. He does not check his blood sugars at home. Denies increase thirst, urinary frequency, or weight changes.     History of CAD s/p CABG. No new symptoms. Denies chest pain or dyspnea. No new swelling. He is running low on his Plavix, statin, and metoprolol.     HPI    The following have been reviewed and updated as appropriate in this visit:    Allergies   Allergen Reactions   • Amoxicillin      HIVES, NAUSEA   • Penicillins      HIVES, NAUSEA     Current Outpatient Medications   Medication Sig Dispense Refill   • metFORMIN (GLUCOPHAGE) 500 mg tablet Take 2 tablets (1,000 mg total) by mouth 2 (two) times a day with meals Appointment needed. 120 tablet 0   • metoprolol tartrate (LOPRESSOR) 25 mg tablet Take 1 tablet (25 mg total) by mouth 2 (two) times a day 180 tablet 0   • atorvastatin (LIPITOR) 80 mg tablet Take 1 tablet (80 mg total) by mouth daily 90 tablet 0   • empagliflozin (Jardiance) 25 mg tablet Take 0.5 tablets (12.5 mg total) by mouth daily Indications: patient takes 1/2 tab daily 45 tablet 0   • clopidogreL (PLAVIX) 75 mg tablet Take 1 tablet (75 mg total) by mouth daily Appointment needed for further refills. 90 tablet 0   • lisinopril (PRINIVIL,ZESTRIL) 5 mg tablet Take 1 tablet (5 mg total) by mouth daily 90 tablet 3   • dexlansoprazole (Dexilant) 60 mg capsule Take 1 capsule (60 mg total) by mouth daily 90 capsule 3   • aspirin 81 mg EC tablet Take 81 mg by mouth daily.     • isosorbide mononitrate (IMDUR) 30 mg 24 hr tablet Take 30 mg by mouth daily  Indications: patient only takes as needed if having pain       No current facility-administered medications for this visit.      Past Medical History:   Diagnosis Date   • Diabetes mellitus (CMS/HCC) ([facility])    • Hypertension      Past Surgical History:   Procedure Laterality Date   • CARDIAC SURGERY  01/04/2016    Cardiac bypass X4 vessels   • CARDIAC SURGERY  01/01/1997    2 stents   • CATARACT EXTRACTION Left    • CHOLECYSTECTOMY  02/14/2007   • COLONOSCOPY      Approx 8 years ago with Dr Mcguire- was to have 5 year follow up   • COLONOSCOPY  01/01/2015    Polyps     Family History   Problem Relation Age of Onset   • Heart disease Mother    • Heart attack Father         Myocardial infarction   • Diabetes type II Brother         Toes removed due to diabetes     Social History     Occupational History   • Not on file   Tobacco Use   • Smoking status: Former Smoker     Packs/day: 0.00     Years: 20.00     Pack years: 0.00   • Smokeless tobacco: Never Used   Substance and Sexual Activity   • Alcohol use: Yes     Comment: Occasionally   • Drug use: No   • Sexual activity: Defer   Social History Narrative   • Not on file       Review of Systems   Constitutional: Negative for fatigue and unexpected weight change.   Respiratory: Negative for cough and shortness of breath.    Cardiovascular: Negative for chest pain, palpitations and leg swelling.   Gastrointestinal: Negative for nausea.   Endocrine: Negative for polydipsia and polyuria.   Genitourinary: Negative for difficulty urinating.   Neurological: Negative for light-headedness.   Hematological: Does not bruise/bleed easily.       Objective   Physical Exam  Not completed on Tele Visit. Did speak in full sentences, patient sounded at ease with no distress.      Assessment/Plan   Diagnoses and all orders for this visit:    Coronary artery disease of native artery of native heart with stable angina pectoris (CMS/HCC) (HCC)  -     clopidogreL (PLAVIX) 75 mg tablet; Take  1 tablet (75 mg total) by mouth daily Appointment needed for further refills.  -     Lipid panel Blood, Venous; Future    Type 2 diabetes mellitus without complication, without long-term current use of insulin (CMS/[facility]) ([facility])  -     metFORMIN (GLUCOPHAGE) 500 mg tablet; Take 2 tablets (1,000 mg total) by mouth 2 (two) times a day with meals Appointment needed.  -     atorvastatin (LIPITOR) 80 mg tablet; Take 1 tablet (80 mg total) by mouth daily  -     empagliflozin (Jardiance) 25 mg tablet; Take 0.5 tablets (12.5 mg total) by mouth daily Indications: patient takes 1/2 tab daily  -     Hemoglobin A1c (glycosylated) Blood, Venous; Future    Essential hypertension  -     metoprolol tartrate (LOPRESSOR) 25 mg tablet; Take 1 tablet (25 mg total) by mouth 2 (two) times a day  -     Comprehensive metabolic panel Blood, Venous; Future    Hyperlipidemia, unspecified hyperlipidemia type  -     atorvastatin (LIPITOR) 80 mg tablet; Take 1 tablet (80 mg total) by mouth daily          Will place future lab orders for 3 months from now, did offer [facility], but patient would like to stay home as much as possible with CoVid 19 virus pandemic     Patient reports he is doing well, sent 90 day supply of his medications to pharmacy.     Encourage healthy low carb diet and regular exercise.     Follow up 3 months

## 2020-05-30 DIAGNOSIS — E11.9 TYPE 2 DIABETES MELLITUS WITHOUT COMPLICATION, WITHOUT LONG-TERM CURRENT USE OF INSULIN (CMS/HCC): ICD-10-CM

## 2020-06-01 RX ORDER — METFORMIN HYDROCHLORIDE 500 MG/1
TABLET ORAL
Qty: 360 TABLET | Refills: 0 | Status: SHIPPED | OUTPATIENT
Start: 2020-06-01 | End: 2020-07-15 | Stop reason: SDUPTHER

## 2020-06-29 DIAGNOSIS — I10 ESSENTIAL HYPERTENSION: ICD-10-CM

## 2020-06-29 DIAGNOSIS — E11.9 TYPE 2 DIABETES MELLITUS WITHOUT COMPLICATION, WITHOUT LONG-TERM CURRENT USE OF INSULIN (CMS/HCC): ICD-10-CM

## 2020-06-29 DIAGNOSIS — E78.5 HYPERLIPIDEMIA, UNSPECIFIED HYPERLIPIDEMIA TYPE: ICD-10-CM

## 2020-06-30 DIAGNOSIS — E11.9 TYPE 2 DIABETES MELLITUS WITHOUT COMPLICATION, WITHOUT LONG-TERM CURRENT USE OF INSULIN (CMS/HCC): ICD-10-CM

## 2020-06-30 RX ORDER — METOPROLOL TARTRATE 25 MG/1
TABLET, FILM COATED ORAL
Qty: 60 TABLET | Refills: 0 | Status: SHIPPED | OUTPATIENT
Start: 2020-06-30 | End: 2020-07-15 | Stop reason: SDUPTHER

## 2020-06-30 RX ORDER — EMPAGLIFLOZIN 25 MG/1
12.5 TABLET, FILM COATED ORAL DAILY
Qty: 15 TABLET | Refills: 0 | Status: SHIPPED | OUTPATIENT
Start: 2020-06-30 | End: 2020-07-15 | Stop reason: SDUPTHER

## 2020-06-30 RX ORDER — ATORVASTATIN CALCIUM 80 MG/1
TABLET, FILM COATED ORAL
Qty: 30 TABLET | Refills: 0 | Status: SHIPPED | OUTPATIENT
Start: 2020-06-30 | End: 2020-08-03

## 2020-07-14 ENCOUNTER — APPOINTMENT (OUTPATIENT)
Dept: LAB | Facility: CLINIC | Age: 75
End: 2020-07-14
Payer: MEDICARE

## 2020-07-14 DIAGNOSIS — I25.118 CORONARY ARTERY DISEASE OF NATIVE ARTERY OF NATIVE HEART WITH STABLE ANGINA PECTORIS (CMS/HCC): ICD-10-CM

## 2020-07-14 DIAGNOSIS — E11.9 TYPE 2 DIABETES MELLITUS WITHOUT COMPLICATION, WITHOUT LONG-TERM CURRENT USE OF INSULIN (CMS/HCC): ICD-10-CM

## 2020-07-14 DIAGNOSIS — I10 ESSENTIAL HYPERTENSION: ICD-10-CM

## 2020-07-14 LAB
ALBUMIN SERPL-MCNC: 4.3 G/DL (ref 3.4–5)
ALP SERPL-CCNC: 59 U/L (ref 45–115)
ALT SERPL-CCNC: 48 U/L (ref 0–77)
ANION GAP SERPL CALC-SCNC: 13 MMOL/L (ref 3–11)
AST SERPL-CCNC: 41 U/L (ref 0–37)
BILIRUB SERPL-MCNC: 1 MG/DL (ref 0–1.4)
BUN SERPL-MCNC: 14 MG/DL (ref 7–25)
CALCIUM ALBUM COR SERPL-MCNC: 9.3 MG/DL (ref 8.6–10.3)
CALCIUM SERPL-MCNC: 9.5 MG/DL (ref 8.6–10.3)
CHLORIDE SERPL-SCNC: 103 MMOL/L (ref 98–107)
CHOLEST SERPL-MCNC: 110 MG/DL (ref 0–199)
CO2 SERPL-SCNC: 27 MMOL/L (ref 21–32)
CREAT SERPL-MCNC: 0.7 MG/DL (ref 0.7–1.3)
EST. AVERAGE GLUCOSE BLD GHB EST-MCNC: 168.6 MG/DL
FASTING STATUS PATIENT QL REPORTED: YES
GFR SERPL CREATININE-BSD FRML MDRD: 92 ML/MIN/1.73M*2
GLUCOSE SERPL-MCNC: 189 MG/DL (ref 70–105)
HBA1C MFR BLD: 7.5 % (ref 4–6)
HDLC SERPL-MCNC: 26 MG/DL
LDLC SERPL CALC-MCNC: <20 MG/DL (ref 20–99)
POTASSIUM SERPL-SCNC: 5.1 MMOL/L (ref 3.6–5)
PROT SERPL-MCNC: 7 G/DL (ref 6.3–8.6)
SODIUM SERPL-SCNC: 143 MMOL/L (ref 135–145)
TRIGL SERPL-MCNC: 349 MG/DL

## 2020-07-14 PROCEDURE — 80053 COMPREHEN METABOLIC PANEL: CPT | Performed by: FAMILY MEDICINE

## 2020-07-14 PROCEDURE — 80061 LIPID PANEL: CPT | Performed by: FAMILY MEDICINE

## 2020-07-14 PROCEDURE — 83036 HEMOGLOBIN GLYCOSYLATED A1C: CPT | Performed by: FAMILY MEDICINE

## 2020-07-14 PROCEDURE — 36415 COLL VENOUS BLD VENIPUNCTURE: CPT | Performed by: FAMILY MEDICINE

## 2020-07-15 ENCOUNTER — TELEPHONE (OUTPATIENT)
Dept: FAMILY MEDICINE | Facility: CLINIC | Age: 75
End: 2020-07-15

## 2020-07-15 DIAGNOSIS — E11.9 TYPE 2 DIABETES MELLITUS WITHOUT COMPLICATION, WITHOUT LONG-TERM CURRENT USE OF INSULIN (CMS/HCC): ICD-10-CM

## 2020-07-15 DIAGNOSIS — I10 ESSENTIAL HYPERTENSION: ICD-10-CM

## 2020-07-15 RX ORDER — EMPAGLIFLOZIN 25 MG/1
12.5 TABLET, FILM COATED ORAL DAILY
Qty: 15 TABLET | Refills: 0 | Status: SHIPPED | OUTPATIENT
Start: 2020-07-15 | End: 2020-08-03

## 2020-07-15 RX ORDER — METFORMIN HYDROCHLORIDE 500 MG/1
500 TABLET ORAL 2 TIMES DAILY WITH MEALS
Qty: 60 TABLET | Refills: 0 | Status: SHIPPED | OUTPATIENT
Start: 2020-07-15 | End: 2020-08-05 | Stop reason: SDUPTHER

## 2020-07-15 RX ORDER — LISINOPRIL 5 MG/1
5 TABLET ORAL DAILY
Qty: 30 TABLET | Refills: 0 | Status: SHIPPED | OUTPATIENT
Start: 2020-07-15 | End: 2020-08-05 | Stop reason: SDUPTHER

## 2020-07-15 RX ORDER — METOPROLOL TARTRATE 25 MG/1
25 TABLET, FILM COATED ORAL 2 TIMES DAILY
Qty: 60 TABLET | Refills: 0 | Status: SHIPPED | OUTPATIENT
Start: 2020-07-15 | End: 2020-08-05 | Stop reason: SDUPTHER

## 2020-07-15 NOTE — TELEPHONE ENCOUNTER
Called patient with lab results, patient verbalized understanding.  He stated he needs med refills, did 30 day refills and transferred patient to scheduling to schedule 3 month check up.

## 2020-07-22 ENCOUNTER — HOSPITAL ENCOUNTER (EMERGENCY)
Facility: HOSPITAL | Age: 75
Discharge: HOME | End: 2020-07-22
Attending: FAMILY MEDICINE
Payer: MEDICARE

## 2020-07-22 VITALS
RESPIRATION RATE: 18 BRPM | SYSTOLIC BLOOD PRESSURE: 122 MMHG | HEART RATE: 88 BPM | WEIGHT: 235 LBS | BODY MASS INDEX: 31.83 KG/M2 | TEMPERATURE: 98.4 F | DIASTOLIC BLOOD PRESSURE: 77 MMHG | OXYGEN SATURATION: 94 % | HEIGHT: 72 IN

## 2020-07-22 DIAGNOSIS — T78.40XA ALLERGIC REACTION: Primary | ICD-10-CM

## 2020-07-22 DIAGNOSIS — L03.90 CELLULITIS: ICD-10-CM

## 2020-07-22 LAB
BASOPHILS # BLD AUTO: 0.1 10*3/UL
BASOPHILS NFR BLD AUTO: 1 % (ref 0–2)
EOSINOPHIL # BLD AUTO: 0.1 10*3/UL
EOSINOPHIL NFR BLD AUTO: 1 % (ref 0–3)
ERYTHROCYTE [DISTWIDTH] IN BLOOD BY AUTOMATED COUNT: 14.2 % (ref 11.5–15)
HCT VFR BLD AUTO: 49.6 % (ref 38–50)
HGB BLD-MCNC: 17 G/DL (ref 13.2–17.2)
LYMPHOCYTES # BLD AUTO: 1.3 10*3/UL
LYMPHOCYTES NFR BLD AUTO: 11 % (ref 15–47)
MCH RBC QN AUTO: 31.4 PG (ref 29–34)
MCHC RBC AUTO-ENTMCNC: 34.2 G/DL (ref 32–36)
MCV RBC AUTO: 91.8 FL (ref 82–97)
MONOCYTES # BLD AUTO: 0.7 10*3/UL
MONOCYTES NFR BLD AUTO: 6 % (ref 5–13)
NEUTROPHILS # BLD AUTO: 9.7 10*3/UL
NEUTROPHILS NFR BLD AUTO: 82 % (ref 46–70)
PLATELET # BLD AUTO: 202 10*3/UL (ref 130–350)
PMV BLD AUTO: 7.6 FL (ref 6.9–10.8)
RBC # BLD AUTO: 5.41 10*6/ΜL (ref 4.1–5.8)
WBC # BLD AUTO: 11.9 10*3/UL (ref 3.7–9.6)

## 2020-07-22 PROCEDURE — 99281 EMR DPT VST MAYX REQ PHY/QHP: CPT | Performed by: FAMILY MEDICINE

## 2020-07-22 PROCEDURE — 6360000200 HC RX 636 W HCPCS (ALT 250 FOR IP): Performed by: FAMILY MEDICINE

## 2020-07-22 PROCEDURE — 99283 EMERGENCY DEPT VISIT LOW MDM: CPT | Performed by: FAMILY MEDICINE

## 2020-07-22 PROCEDURE — 96374 THER/PROPH/DIAG INJ IV PUSH: CPT

## 2020-07-22 PROCEDURE — 99282 EMERGENCY DEPT VISIT SF MDM: CPT

## 2020-07-22 PROCEDURE — 36415 COLL VENOUS BLD VENIPUNCTURE: CPT | Performed by: FAMILY MEDICINE

## 2020-07-22 PROCEDURE — 96375 TX/PRO/DX INJ NEW DRUG ADDON: CPT

## 2020-07-22 PROCEDURE — 85025 COMPLETE CBC W/AUTO DIFF WBC: CPT | Performed by: FAMILY MEDICINE

## 2020-07-22 RX ORDER — FAMOTIDINE 10 MG/ML
INJECTION INTRAVENOUS
Status: DISCONTINUED
Start: 2020-07-22 | End: 2020-07-22 | Stop reason: HOSPADM

## 2020-07-22 RX ORDER — PREDNISONE 20 MG/1
20 TABLET ORAL 2 TIMES DAILY
Qty: 10 TABLET | Refills: 0 | Status: SHIPPED | OUTPATIENT
Start: 2020-07-22 | End: 2020-07-27

## 2020-07-22 RX ORDER — CEPHALEXIN 500 MG/1
500 CAPSULE ORAL 4 TIMES DAILY
Qty: 40 CAPSULE | Refills: 0 | Status: SHIPPED | OUTPATIENT
Start: 2020-07-22 | End: 2020-08-01

## 2020-07-22 RX ORDER — FAMOTIDINE 10 MG/ML
20 INJECTION INTRAVENOUS ONCE
Status: COMPLETED | OUTPATIENT
Start: 2020-07-22 | End: 2020-07-22

## 2020-07-22 RX ADMIN — METHYLPREDNISOLONE SODIUM SUCCINATE 125 MG: 125 INJECTION, POWDER, FOR SOLUTION INTRAMUSCULAR; INTRAVENOUS at 14:36

## 2020-07-22 RX ADMIN — FAMOTIDINE 20 MG: 10 INJECTION INTRAVENOUS at 14:36

## 2020-07-22 NOTE — DISCHARGE INSTRUCTIONS
Prednisone twice daily as prescribed.  Benadryl (diphenhydramine) 25 mg 3-4 times a day.  Take 1 Benadryl tonight at bedtime.  Claritin (loratadine) 10 mg daily.  Pepcid (famotidine) 20 mg daily.  If symptoms worsen such as hand becomes more red or swollen then start antibiotics (cephalexin).  Return to the emergency department with any worsening symptoms such as shortness of breath, difficulty breathing.

## 2020-07-23 ASSESSMENT — ENCOUNTER SYMPTOMS
BACK PAIN: 0
PALPITATIONS: 0
EYE PAIN: 0
SORE THROAT: 0
COUGH: 0
ALLERGIC REACTION: 1
HEMATURIA: 0
DYSURIA: 0
ARTHRALGIAS: 0
SEIZURES: 0
ABDOMINAL PAIN: 0
FEVER: 0
VOMITING: 0
COLOR CHANGE: 0
CHILLS: 0
SHORTNESS OF BREATH: 0

## 2020-07-23 NOTE — ED PROVIDER NOTES
"    HPI:  Chief Complaint   Patient presents with   • Facial Swelling     began in [place]       75-year-old male presents with swelling of the left hand and arm.  He also has some redness of the forearm.  He also noted some swelling of the face.  This started in a field approximately an hour ago or so.  He was seen at the urgent care and was referred here for further evaluation with a concern of cellulitis.  He denies any difficulty breathing.  Denies any shortness of breath.  He does state he has some \"fullness\" when he swallows but has no trouble.      History provided by:  Patient   used: No    Allergic Reaction   Presenting symptoms: itching, rash and swelling    Severity:  Mild  Duration:  60 minutes  Prior allergic episodes:  Unable to specify  Context: grass    Context: not insect bite/sting and not poison ivy    Relieved by:  None tried  Worsened by:  Nothing  Ineffective treatments:  None tried      HISTORY:  Past Medical History:   Diagnosis Date   • Diabetes mellitus (CMS/HCC) (HCC)    • Hypertension        Past Surgical History:   Procedure Laterality Date   • CARDIAC SURGERY  01/04/2016    Cardiac bypass X4 vessels   • CARDIAC SURGERY  01/01/1997    2 stents   • CATARACT EXTRACTION Left    • CHOLECYSTECTOMY  02/14/2007   • COLONOSCOPY      Approx 8 years ago with Dr Mcguire- was to have 5 year follow up   • COLONOSCOPY  01/01/2015    Polyps       Family History   Problem Relation Age of Onset   • Heart disease Mother    • Heart attack Father         Myocardial infarction   • Diabetes type II Brother         Toes removed due to diabetes       Social History     Tobacco Use   • Smoking status: Former Smoker     Packs/day: 0.00     Years: 20.00     Pack years: 0.00   • Smokeless tobacco: Never Used   Substance Use Topics   • Alcohol use: Yes     Comment: Occasionally   • Drug use: No         ROS:  Review of Systems   Constitutional: Negative for chills and fever.   HENT: Negative " for ear pain and sore throat.    Eyes: Negative for pain and visual disturbance.   Respiratory: Negative for cough and shortness of breath.    Cardiovascular: Negative for chest pain and palpitations.   Gastrointestinal: Negative for abdominal pain and vomiting.   Genitourinary: Negative for dysuria and hematuria.   Musculoskeletal: Negative for arthralgias and back pain.   Skin: Positive for itching and rash. Negative for color change.   Neurological: Negative for seizures and syncope.   All other systems reviewed and are negative.      PE:  ED Triage Vitals [07/22/20 1426]   Temp Heart Rate Resp BP SpO2   36.9 °C (98.4 °F) 88 18 123/73 94 %      Temp src Heart Rate Source Patient Position BP Location FiO2 (%)   -- -- -- -- --       Physical Exam  Vitals signs and nursing note reviewed.   Constitutional:       Appearance: He is well-developed.   HENT:      Head: Normocephalic and atraumatic.   Eyes:      Conjunctiva/sclera: Conjunctivae normal.      Pupils: Pupils are equal, round, and reactive to light.   Neck:      Musculoskeletal: Normal range of motion and neck supple.   Cardiovascular:      Rate and Rhythm: Normal rate and regular rhythm.      Heart sounds: Normal heart sounds. No murmur.   Pulmonary:      Effort: Pulmonary effort is normal. No respiratory distress.      Breath sounds: Normal breath sounds.   Abdominal:      General: Bowel sounds are normal.      Palpations: Abdomen is soft.      Tenderness: There is no abdominal tenderness.   Musculoskeletal: Normal range of motion.      Comments: Left hand is mildly swollen.  There is some red streaking of the left forearm.   Skin:     General: Skin is warm and dry.      Capillary Refill: Capillary refill takes less than 2 seconds.   Neurological:      Mental Status: He is alert and oriented to person, place, and time.         ED LABS:  Labs Reviewed   CBC WITH AUTO DIFFERENTIAL - Abnormal       Result Value    WBC 11.9 (*)     RBC 5.41      Hemoglobin 17.0       Hematocrit 49.6      MCV 91.8      MCH 31.4      MCHC 34.2      RDW 14.2      Platelets 202      MPV 7.6      Neutrophils% 82 (*)     Lymphocytes% 11 (*)     Monocytes% 6      Eosinophils% 1      Basophils% 1      Neutrophils Absolute 9.70      Lymphocytes Absolute 1.30      Monocytes Absolute 0.70      Eosinophils Absolute 0.10      Basophils Absolute 0.10           ED IMAGES:  No orders to display       ED PROCEDURES:  Procedures    ED COURSE:     75-year-old male presents with swelling of the left hand and arm.  There is some mild facial swelling and lip swelling.  Patient is given IV Solu-Medrol, Pepcid and Benadryl.  He had significant improvement of his symptoms.  He had minimal residual left hand swelling but was overall much improved.  The patient will be discharged home on 5 days of prednisone.  I also recommended daily Claritin and Pepcid.  Benadryl 3 times a day.  I did give him a prescription for Keflex in the event that the redness of the hand worsened and the streaking did not resolve.  However, I think this is more allergic.  He will take the Keflex if that does not improve or if it worsens.  He will otherwise follow-up as needed.       MDM:  MDM  Number of Diagnoses or Management Options  Allergic reaction:   Cellulitis:      Amount and/or Complexity of Data Reviewed  Clinical lab tests: reviewed and ordered    Risk of Complications, Morbidity, and/or Mortality  Presenting problems: moderate  Diagnostic procedures: moderate  Management options: moderate    Patient Progress  Patient progress: improved      Final diagnoses:   [T78.40XA] Allergic reaction   [L03.90] Cellulitis        Sha Floyd MD  07/23/20 6346

## 2020-08-03 DIAGNOSIS — E78.5 HYPERLIPIDEMIA, UNSPECIFIED HYPERLIPIDEMIA TYPE: ICD-10-CM

## 2020-08-03 DIAGNOSIS — E11.9 TYPE 2 DIABETES MELLITUS WITHOUT COMPLICATION, WITHOUT LONG-TERM CURRENT USE OF INSULIN (CMS/HCC): ICD-10-CM

## 2020-08-03 RX ORDER — EMPAGLIFLOZIN 25 MG/1
TABLET, FILM COATED ORAL
Qty: 15 TABLET | Refills: 0 | Status: SHIPPED | OUTPATIENT
Start: 2020-08-03 | End: 2020-08-05 | Stop reason: SDUPTHER

## 2020-08-03 RX ORDER — ATORVASTATIN CALCIUM 80 MG/1
TABLET, FILM COATED ORAL
Qty: 30 TABLET | Refills: 0 | Status: SHIPPED | OUTPATIENT
Start: 2020-08-03 | End: 2020-08-05 | Stop reason: SDUPTHER

## 2020-08-05 ENCOUNTER — TELEPHONE (OUTPATIENT)
Dept: FAMILY MEDICINE | Facility: CLINIC | Age: 75
End: 2020-08-05

## 2020-08-05 DIAGNOSIS — E78.5 HYPERLIPIDEMIA, UNSPECIFIED HYPERLIPIDEMIA TYPE: ICD-10-CM

## 2020-08-05 DIAGNOSIS — I10 ESSENTIAL HYPERTENSION: ICD-10-CM

## 2020-08-05 DIAGNOSIS — E11.9 TYPE 2 DIABETES MELLITUS WITHOUT COMPLICATION, WITHOUT LONG-TERM CURRENT USE OF INSULIN (CMS/HCC): ICD-10-CM

## 2020-08-05 DIAGNOSIS — I25.118 CORONARY ARTERY DISEASE OF NATIVE ARTERY OF NATIVE HEART WITH STABLE ANGINA PECTORIS (CMS/HCC): ICD-10-CM

## 2020-08-05 RX ORDER — LISINOPRIL 5 MG/1
5 TABLET ORAL DAILY
Qty: 90 TABLET | Refills: 1 | Status: SHIPPED | OUTPATIENT
Start: 2020-08-05 | End: 2021-03-17

## 2020-08-05 RX ORDER — METFORMIN HYDROCHLORIDE 500 MG/1
500 TABLET ORAL 2 TIMES DAILY WITH MEALS
Qty: 180 TABLET | Refills: 1 | Status: SHIPPED | OUTPATIENT
Start: 2020-08-05 | End: 2020-10-20 | Stop reason: SDUPTHER

## 2020-08-05 RX ORDER — CLOPIDOGREL BISULFATE 75 MG/1
75 TABLET ORAL DAILY
Qty: 90 TABLET | Refills: 1 | Status: SHIPPED | OUTPATIENT
Start: 2020-08-05 | End: 2021-03-10

## 2020-08-05 RX ORDER — ATORVASTATIN CALCIUM 80 MG/1
80 TABLET, FILM COATED ORAL DAILY
Qty: 90 TABLET | Refills: 1 | Status: SHIPPED | OUTPATIENT
Start: 2020-08-05 | End: 2021-03-09

## 2020-08-05 RX ORDER — METOPROLOL TARTRATE 25 MG/1
25 TABLET, FILM COATED ORAL 2 TIMES DAILY
Qty: 180 TABLET | Refills: 1 | Status: SHIPPED | OUTPATIENT
Start: 2020-08-05 | End: 2021-03-02

## 2020-08-05 RX ORDER — EMPAGLIFLOZIN 25 MG/1
0.5 TABLET, FILM COATED ORAL DAILY
Qty: 45 TABLET | Refills: 1 | Status: SHIPPED | OUTPATIENT
Start: 2020-08-05 | End: 2020-10-20 | Stop reason: SDUPTHER

## 2020-08-05 NOTE — TELEPHONE ENCOUNTER
For Blank - please call Abhilash at [phone] - he did not say why he needed to visit with you.

## 2020-09-15 ENCOUNTER — OFFICE VISIT (OUTPATIENT)
Dept: FAMILY MEDICINE | Facility: CLINIC | Age: 75
End: 2020-09-15
Payer: MEDICARE

## 2020-09-15 VITALS
TEMPERATURE: 98.5 F | WEIGHT: 227 LBS | HEART RATE: 73 BPM | BODY MASS INDEX: 30.75 KG/M2 | RESPIRATION RATE: 18 BRPM | SYSTOLIC BLOOD PRESSURE: 144 MMHG | DIASTOLIC BLOOD PRESSURE: 88 MMHG | HEIGHT: 72 IN

## 2020-09-15 DIAGNOSIS — L08.9 SKIN INFECTION: Primary | ICD-10-CM

## 2020-09-15 PROCEDURE — 99213 OFFICE O/P EST LOW 20 MIN: CPT | Performed by: FAMILY MEDICINE

## 2020-09-15 RX ORDER — MUPIROCIN 20 MG/G
1 OINTMENT TOPICAL 3 TIMES DAILY
Qty: 22 G | Refills: 2 | Status: SHIPPED | OUTPATIENT
Start: 2020-09-15 | End: 2020-09-22

## 2020-09-15 ASSESSMENT — ENCOUNTER SYMPTOMS
COLOR CHANGE: 0
SHORTNESS OF BREATH: 0
DIAPHORESIS: 0
NUMBNESS: 0
WOUND: 1
CHILLS: 0
FEVER: 0
FATIGUE: 0

## 2020-09-15 NOTE — PROGRESS NOTES
Subjective      Abhilash Huggins is a 75 y.o. male who presents for concern of skin infection on his right foot.  About 10 to 15 days ago he noticed a red line across the top of his foot about 1 cm then and over time now it is becoming a little more red and tender to touch.  With his history of diabetes he was concerned for a local infection and would like it evaluated today.  He has no other symptoms denies fever, chills, pain with walking, or drainage from this area.  He has been keeping it clean with hydrogen peroxide.    HPI    The following have been reviewed and updated as appropriate in this visit:         Allergies   Allergen Reactions   • Amoxicillin      HIVES, NAUSEA   • Penicillins      HIVES, NAUSEA     Current Outpatient Medications   Medication Sig Dispense Refill   • mupirocin (BACTROBAN) 2 % ointment Apply 1 application topically 3 (three) times a day for 7 days 22 g 2   • clopidogreL (PLAVIX) 75 mg tablet Take 1 tablet (75 mg total) by mouth daily 90 tablet 1   • empagliflozin (Jardiance) 25 mg tablet Take 0.5 tablets (12.5 mg total) by mouth daily 45 tablet 1   • atorvastatin (LIPITOR) 80 mg tablet Take 1 tablet (80 mg total) by mouth daily 90 tablet 1   • lisinopriL (PRINIVIL,ZESTRIL) 5 mg tablet Take 1 tablet (5 mg total) by mouth daily 90 tablet 1   • metFORMIN (GLUCOPHAGE) 500 mg tablet Take 1 tablet (500 mg total) by mouth 2 (two) times a day with meals 180 tablet 1   • metoprolol tartrate (LOPRESSOR) 25 mg tablet Take 1 tablet (25 mg total) by mouth 2 (two) times a day 180 tablet 1   • isosorbide mononitrate (IMDUR) 30 mg 24 hr tablet Take 30 mg by mouth daily Indications: patient only takes as needed if having pain     • dexlansoprazole (Dexilant) 60 mg capsule Take 1 capsule (60 mg total) by mouth daily 90 capsule 3   • aspirin 81 mg EC tablet Take 81 mg by mouth daily.       No current facility-administered medications for this visit.      Past Medical History:   Diagnosis Date   • Diabetes  mellitus (CMS/HCC) (HCC)    • Hypertension      Past Surgical History:   Procedure Laterality Date   • CARDIAC SURGERY  01/04/2016    Cardiac bypass X4 vessels   • CARDIAC SURGERY  01/01/1997    2 stents   • CATARACT EXTRACTION Left    • CHOLECYSTECTOMY  02/14/2007   • COLONOSCOPY      Approx 8 years ago with Dr Mcguire- was to have 5 year follow up   • COLONOSCOPY  01/01/2015    Polyps     Family History   Problem Relation Age of Onset   • Heart disease Mother    • Heart attack Father         Myocardial infarction   • Diabetes type II Brother         Toes removed due to diabetes     Social History     Occupational History   • Not on file   Tobacco Use   • Smoking status: Former Smoker     Packs/day: 0.00     Years: 20.00     Pack years: 0.00   • Smokeless tobacco: Never Used   Substance and Sexual Activity   • Alcohol use: Yes     Comment: Occasionally   • Drug use: No   • Sexual activity: Defer   Social History Narrative   • Not on file       Review of Systems   Constitutional: Negative for chills, diaphoresis, fatigue and fever.   Respiratory: Negative for shortness of breath.    Skin: Positive for wound (right foot ). Negative for color change and rash.   Neurological: Negative for numbness.   All other systems reviewed and are negative.    As noted in HPI    Objective   /88 (BP Location: Right arm, Patient Position: Sitting, Cuff Size: Regular Adult)   Pulse 73   Temp 36.9 °C (98.5 °F)   Resp 18   Ht 1.829 m (6')   Wt 103 kg (227 lb)   BMI 30.79 kg/m²     Physical Exam  Constitutional:       Appearance: Normal appearance. He is normal weight.   Cardiovascular:      Rate and Rhythm: Normal rate and regular rhythm.      Pulses: Normal pulses.   Pulmonary:      Effort: Pulmonary effort is normal.      Breath sounds: Normal breath sounds.   Skin:     General: Skin is warm and dry.      Capillary Refill: Capillary refill takes less than 2 seconds.      Findings: Lesion (1.1x.5 cm ulceration, dry, with  surrouding boarder of erythema 1.5 x 1 cm ) present.      Comments: Intact monofilament testing    Neurological:      Mental Status: He is alert.         ASSESSMENT AND PLAN   Diagnoses and all orders for this visit:    Skin infection  -     mupirocin (BACTROBAN) 2 % ointment; Apply 1 application topically 3 (three) times a day for 7 days    Recommend he apply mupirocin 2-3 times daily for a week also with spreading erythematous border and history of diabetes I recommend a 7-week course of Keflex.  I did offer to prescribe this but he has a 10-day supply he never took at home from 2020 so it is not  they are 500 mg capsules.    Instructed him to take the Keflex twice daily with food for 1 week reviewed side effects including GI side effects.  He does have a known history of stomach upset with this medication but will really try to remember to take it with food.  If unable to tolerate we did discuss switching to a Bactrim.    He has a severe GI allergy to amoxicillin, penicillin, and doxycycline.    Reviewed signs of worsening local skin infection he is to return sooner if any of these occur including fever    Follow-up as needed    Karli Brito MD

## 2020-09-30 DIAGNOSIS — E11.9 TYPE 2 DIABETES MELLITUS WITHOUT COMPLICATION, WITHOUT LONG-TERM CURRENT USE OF INSULIN (CMS/HCC): Primary | ICD-10-CM

## 2020-10-15 ENCOUNTER — APPOINTMENT (OUTPATIENT)
Dept: LAB | Facility: CLINIC | Age: 75
End: 2020-10-15
Payer: MEDICARE

## 2020-10-15 DIAGNOSIS — E11.9 TYPE 2 DIABETES MELLITUS WITHOUT COMPLICATION, WITHOUT LONG-TERM CURRENT USE OF INSULIN (CMS/HCC): ICD-10-CM

## 2020-10-15 LAB
ANION GAP SERPL CALC-SCNC: 12 MMOL/L (ref 3–11)
BUN SERPL-MCNC: 14 MG/DL (ref 7–25)
CALCIUM SERPL-MCNC: 9.3 MG/DL (ref 8.6–10.3)
CHLORIDE SERPL-SCNC: 97 MMOL/L (ref 98–107)
CO2 SERPL-SCNC: 27 MMOL/L (ref 21–32)
CREAT SERPL-MCNC: 0.6 MG/DL (ref 0.7–1.3)
EST. AVERAGE GLUCOSE BLD GHB EST-MCNC: 185.8 MG/DL
GFR SERPL CREATININE-BSD FRML MDRD: 98 ML/MIN/1.73M*2
GLUCOSE SERPL-MCNC: 263 MG/DL (ref 70–105)
HBA1C MFR BLD: 8.1 % (ref 4–6)
POTASSIUM SERPL-SCNC: 4.7 MMOL/L (ref 3.6–5)
SODIUM SERPL-SCNC: 136 MMOL/L (ref 135–145)

## 2020-10-15 PROCEDURE — 80048 BASIC METABOLIC PNL TOTAL CA: CPT | Performed by: FAMILY MEDICINE

## 2020-10-15 PROCEDURE — 83036 HEMOGLOBIN GLYCOSYLATED A1C: CPT | Performed by: FAMILY MEDICINE

## 2020-10-15 PROCEDURE — 36415 COLL VENOUS BLD VENIPUNCTURE: CPT | Mod: NCP | Performed by: FAMILY MEDICINE

## 2020-10-20 DIAGNOSIS — E11.9 TYPE 2 DIABETES MELLITUS WITHOUT COMPLICATION, WITHOUT LONG-TERM CURRENT USE OF INSULIN (CMS/HCC): ICD-10-CM

## 2020-10-20 RX ORDER — METFORMIN HYDROCHLORIDE 1000 MG/1
1000 TABLET ORAL 2 TIMES DAILY WITH MEALS
Qty: 180 TABLET | Refills: 0 | Status: SHIPPED | OUTPATIENT
Start: 2020-10-20 | End: 2021-02-01

## 2020-10-20 RX ORDER — EMPAGLIFLOZIN 25 MG/1
1 TABLET, FILM COATED ORAL DAILY
Qty: 90 TABLET | Refills: 0 | Status: SHIPPED | OUTPATIENT
Start: 2020-10-20 | End: 2021-02-18 | Stop reason: SDUPTHER

## 2021-01-04 DIAGNOSIS — K21.9 GASTROESOPHAGEAL REFLUX DISEASE, UNSPECIFIED WHETHER ESOPHAGITIS PRESENT: Primary | ICD-10-CM

## 2021-01-04 RX ORDER — DEXLANSOPRAZOLE 60 MG/1
CAPSULE, DELAYED RELEASE ORAL
Qty: 90 CAPSULE | Refills: 0 | Status: SHIPPED | OUTPATIENT
Start: 2021-01-04 | End: 2021-06-08

## 2021-02-01 ENCOUNTER — APPOINTMENT (OUTPATIENT)
Dept: LAB | Facility: CLINIC | Age: 76
End: 2021-02-01
Payer: MEDICARE

## 2021-02-01 DIAGNOSIS — E11.9 TYPE 2 DIABETES MELLITUS WITHOUT COMPLICATION, WITHOUT LONG-TERM CURRENT USE OF INSULIN (CMS/HCC): ICD-10-CM

## 2021-02-01 LAB
EST. AVERAGE GLUCOSE BLD GHB EST-MCNC: 177.2 MG/DL
HBA1C MFR BLD: 7.8 % (ref 4–6)

## 2021-02-01 PROCEDURE — 36415 COLL VENOUS BLD VENIPUNCTURE: CPT | Mod: NCP | Performed by: FAMILY MEDICINE

## 2021-02-01 PROCEDURE — 83036 HEMOGLOBIN GLYCOSYLATED A1C: CPT | Performed by: FAMILY MEDICINE

## 2021-02-01 RX ORDER — METFORMIN HYDROCHLORIDE 1000 MG/1
TABLET ORAL
Qty: 180 TABLET | Refills: 0 | Status: SHIPPED | OUTPATIENT
Start: 2021-02-01 | End: 2021-05-06

## 2021-02-18 ENCOUNTER — OFFICE VISIT (OUTPATIENT)
Dept: FAMILY MEDICINE | Facility: CLINIC | Age: 76
End: 2021-02-18
Payer: MEDICARE

## 2021-02-18 VITALS
TEMPERATURE: 99 F | BODY MASS INDEX: 31.19 KG/M2 | DIASTOLIC BLOOD PRESSURE: 74 MMHG | HEART RATE: 84 BPM | RESPIRATION RATE: 20 BRPM | OXYGEN SATURATION: 93 % | WEIGHT: 230 LBS | SYSTOLIC BLOOD PRESSURE: 120 MMHG

## 2021-02-18 DIAGNOSIS — E11.9 TYPE 2 DIABETES MELLITUS WITHOUT COMPLICATION, WITHOUT LONG-TERM CURRENT USE OF INSULIN (CMS/HCC): Primary | ICD-10-CM

## 2021-02-18 DIAGNOSIS — L73.9 FOLLICULITIS: ICD-10-CM

## 2021-02-18 DIAGNOSIS — S76.319A HAMSTRING MUSCLE STRAIN, UNSPECIFIED LATERALITY, INITIAL ENCOUNTER: ICD-10-CM

## 2021-02-18 PROCEDURE — 99213 OFFICE O/P EST LOW 20 MIN: CPT | Performed by: FAMILY MEDICINE

## 2021-02-18 RX ORDER — CLINDAMYCIN PHOSPHATE 10 UG/ML
1 LOTION TOPICAL 2 TIMES DAILY
Qty: 60 ML | Refills: 0 | Status: SHIPPED | OUTPATIENT
Start: 2021-02-18 | End: 2022-02-18 | Stop reason: WASHOUT

## 2021-02-18 RX ORDER — EMPAGLIFLOZIN 25 MG/1
1 TABLET, FILM COATED ORAL DAILY
Qty: 90 TABLET | Refills: 0 | Status: SHIPPED | OUTPATIENT
Start: 2021-02-18 | End: 2021-05-19

## 2021-02-18 ASSESSMENT — ENCOUNTER SYMPTOMS
PALPITATIONS: 0
COUGH: 0
NUMBNESS: 0
VOMITING: 0
DYSURIA: 0
FEVER: 0
WEAKNESS: 0
DIFFICULTY URINATING: 0
EYE PAIN: 0
NAUSEA: 0
CHILLS: 0
SHORTNESS OF BREATH: 0
MYALGIAS: 1

## 2021-02-18 NOTE — PROGRESS NOTES
Subjective      Abhilash Huggins is a 76 y.o. male who presents for diabetes follow-up.  A1c 2 weeks ago of 7.8 this is improved compared to 4 months ago of 8.1.  Patient is currently taking full strength Metformin and Jardiance 25 mg daily.  He does admit to eating more sweets than usual over the past 3 months with Paula, birthdays, and anniversary holidays.  On a normal basis he tries to avoid sweets, breads, and pastas.  He does enjoy some potatoes from time to time.    He does mention some tightness and discomfort in his hamstrings bilaterally.  Points to the back of his upper thigh.  No specific injury no change in activity.  Describes the pain is more muscle in nature as a tightness.    Also the past 3 to 4 months he has had some painful red bumps on his forehead that come and go in cycles.  Though started out as a red bump and sometimes will have clear fluid drained .    HPI    The following have been reviewed and updated as appropriate in this visit:  Tobacco  Allergies  Meds  Problems  Med Hx  Surg Hx  Fam Hx         Allergies   Allergen Reactions   • Amoxicillin      HIVES, NAUSEA   • Penicillins      HIVES, NAUSEA     Current Outpatient Medications   Medication Sig Dispense Refill   • empagliflozin (Jardiance) 25 mg tablet Take 1 tablet (25 mg total) by mouth daily 90 tablet 0   • metFORMIN (GLUCOPHAGE) 1,000 mg tablet TAKE ONE TABLET BY MOUTH TWICE DAILY 180 tablet 0   • Dexilant 60 mg capsule TAKE ONE CAPSULE BY MOUTH EVERY DAY 90 capsule 0   • clopidogreL (PLAVIX) 75 mg tablet Take 1 tablet (75 mg total) by mouth daily 90 tablet 1   • atorvastatin (LIPITOR) 80 mg tablet Take 1 tablet (80 mg total) by mouth daily 90 tablet 1   • lisinopriL (PRINIVIL,ZESTRIL) 5 mg tablet Take 1 tablet (5 mg total) by mouth daily 90 tablet 1   • metoprolol tartrate (LOPRESSOR) 25 mg tablet Take 1 tablet (25 mg total) by mouth 2 (two) times a day 180 tablet 1   • isosorbide mononitrate (IMDUR) 30 mg 24 hr tablet  Take 30 mg by mouth daily Indications: patient only takes as needed if having pain     • aspirin 81 mg EC tablet Take 81 mg by mouth daily.     • clindamycin (Cleocin T) 1 % lotion Apply 1 application topically 2 (two) times a day 60 mL 0     No current facility-administered medications for this visit.     Past Medical History:   Diagnosis Date   • Diabetes mellitus (CMS/HCC) (HCC)    • Hypertension      Past Surgical History:   Procedure Laterality Date   • CARDIAC SURGERY  01/04/2016    Cardiac bypass X4 vessels   • CARDIAC SURGERY  01/01/1997    2 stents   • CATARACT EXTRACTION Left    • CHOLECYSTECTOMY  02/14/2007   • COLONOSCOPY      Approx 8 years ago with Dr Mcguire- was to have 5 year follow up   • COLONOSCOPY  01/01/2015    Polyps     Family History   Problem Relation Age of Onset   • Heart disease Mother    • Heart attack Father         Myocardial infarction   • Diabetes type II Brother         Toes removed due to diabetes     Social History     Occupational History   • Not on file   Tobacco Use   • Smoking status: Former Smoker     Packs/day: 0.00     Years: 20.00     Pack years: 0.00   • Smokeless tobacco: Never Used   Substance and Sexual Activity   • Alcohol use: Yes     Comment: Occasionally   • Drug use: No   • Sexual activity: Defer   Social History Narrative   • Not on file       Review of Systems   Constitutional: Negative for chills and fever.   Eyes: Negative for pain and visual disturbance.   Respiratory: Negative for cough and shortness of breath.    Cardiovascular: Negative for chest pain and palpitations.   Gastrointestinal: Negative for nausea and vomiting.   Genitourinary: Negative for difficulty urinating and dysuria.   Musculoskeletal: Positive for myalgias (hamstrings).   Skin: Positive for rash (Forehead).   Neurological: Negative for syncope, weakness and numbness.   All other systems reviewed and are negative.    As noted in HPI    Objective   /74 (BP Location: Right arm,  Patient Position: Sitting, Cuff Size: Regular Adult)   Pulse 84   Temp 37.2 °C (99 °F)   Resp 20   Wt 104.3 kg (230 lb)   SpO2 93%   BMI 31.19 kg/m²     Physical Exam  Constitutional:       General: He is not in acute distress.     Appearance: Normal appearance. He is not ill-appearing.   HENT:      Head: Normocephalic.      Mouth/Throat:      Pharynx: No posterior oropharyngeal erythema.   Cardiovascular:      Rate and Rhythm: Normal rate and regular rhythm.      Pulses: Normal pulses.      Heart sounds: Normal heart sounds.   Pulmonary:      Effort: Pulmonary effort is normal. No respiratory distress.      Breath sounds: Normal breath sounds. No wheezing, rhonchi or rales.   Musculoskeletal:         General: No swelling or tenderness. Normal range of motion.      Cervical back: Normal range of motion and neck supple. No muscular tenderness.   Skin:     General: Skin is warm and dry.      Capillary Refill: Capillary refill takes less than 2 seconds.      Findings: Lesion (Diffuse erythematous macules and papules over bilateral forehead. Various stages. ) present. No rash.   Neurological:      General: No focal deficit present.      Mental Status: He is alert and oriented to person, place, and time. Mental status is at baseline.   Psychiatric:         Mood and Affect: Mood normal.         Behavior: Behavior normal.         Judgment: Judgment normal.         ASSESSMENT AND PLAN   Diagnoses and all orders for this visit:    Type 2 diabetes mellitus without complication, without long-term current use of insulin (CMS/[facility]) ([facility])  -     empagliflozin (Jardiance) 25 mg tablet; Take 1 tablet (25 mg total) by mouth daily  -     Comprehensive metabolic panel Blood, Venous; Future  -     Hemoglobin A1c (glycosylated) Blood, Venous; Future    Folliculitis  -     clindamycin (Cleocin T) 1 % lotion; Apply 1 application topically 2 (two) times a day    Hamstring muscle strain, unspecified laterality, initial  encounter    With admittedly eating more sweets than usual.  We will focus on dietary changes over the next 3 months avoiding foods high in carbohydrates and sugars.  He will continue his current dose of Metformin and Jardiance.    We will trial topical antibiotic treatment discussed how may related to winter hat wear rubbing on his follicles, causing a friction folliculitis.  Will apply topical Clinda twice daily for up to 14 days he is to notify me if it worsens.    Discussed next steps for hamstring strain and/or spasm.  He will monitor this and trial stretching, heat and massage at home over the next 3 months if no improvement will consider physical therapy.    Follow-up 3 months with labs    Karli Brito MD      A voice recognition program may have been used to aid in documentation. Words are not always transcribed exactly as spoken. Errors may be present despite efforts to correct and should be taken within the context of the discussion. Please contact the provider if you need assistance interpreting this documentation

## 2021-03-02 DIAGNOSIS — I10 ESSENTIAL HYPERTENSION: ICD-10-CM

## 2021-03-02 RX ORDER — METOPROLOL TARTRATE 25 MG/1
TABLET, FILM COATED ORAL
Qty: 180 TABLET | Refills: 1 | Status: SHIPPED | OUTPATIENT
Start: 2021-03-02 | End: 2021-08-31 | Stop reason: SDUPTHER

## 2021-03-09 DIAGNOSIS — E11.9 TYPE 2 DIABETES MELLITUS WITHOUT COMPLICATION, WITHOUT LONG-TERM CURRENT USE OF INSULIN (CMS/HCC): ICD-10-CM

## 2021-03-09 DIAGNOSIS — I25.118 CORONARY ARTERY DISEASE OF NATIVE ARTERY OF NATIVE HEART WITH STABLE ANGINA PECTORIS (CMS/HCC): ICD-10-CM

## 2021-03-09 DIAGNOSIS — E78.5 HYPERLIPIDEMIA, UNSPECIFIED HYPERLIPIDEMIA TYPE: ICD-10-CM

## 2021-03-09 RX ORDER — ATORVASTATIN CALCIUM 80 MG/1
TABLET, FILM COATED ORAL
Qty: 90 TABLET | Refills: 1 | Status: SHIPPED | OUTPATIENT
Start: 2021-03-09 | End: 2021-08-31 | Stop reason: SDUPTHER

## 2021-03-10 RX ORDER — CLOPIDOGREL BISULFATE 75 MG/1
TABLET ORAL
Qty: 90 TABLET | Refills: 1 | Status: SHIPPED | OUTPATIENT
Start: 2021-03-10 | End: 2021-08-31 | Stop reason: SDUPTHER

## 2021-03-17 DIAGNOSIS — I10 ESSENTIAL HYPERTENSION: ICD-10-CM

## 2021-03-17 RX ORDER — LISINOPRIL 5 MG/1
TABLET ORAL
Qty: 90 TABLET | Refills: 0 | Status: SHIPPED | OUTPATIENT
Start: 2021-03-17 | End: 2021-06-21

## 2021-05-05 ENCOUNTER — APPOINTMENT (OUTPATIENT)
Dept: LAB | Facility: CLINIC | Age: 76
End: 2021-05-05
Payer: MEDICARE

## 2021-05-05 DIAGNOSIS — E11.9 TYPE 2 DIABETES MELLITUS WITHOUT COMPLICATION, WITHOUT LONG-TERM CURRENT USE OF INSULIN (CMS/HCC): ICD-10-CM

## 2021-05-05 LAB
ALBUMIN SERPL-MCNC: 4.3 G/DL (ref 3.4–5)
ALP SERPL-CCNC: 62 U/L (ref 45–115)
ALT SERPL-CCNC: 50 U/L (ref 0–77)
ANION GAP SERPL CALC-SCNC: 9 MMOL/L (ref 3–11)
AST SERPL-CCNC: 44 U/L (ref 0–37)
BILIRUB SERPL-MCNC: 1.3 MG/DL (ref 0–1.4)
BUN SERPL-MCNC: 17 MG/DL (ref 7–25)
CALCIUM ALBUM COR SERPL-MCNC: 9.4 MG/DL (ref 8.6–10.3)
CALCIUM SERPL-MCNC: 9.6 MG/DL (ref 8.6–10.3)
CHLORIDE SERPL-SCNC: 100 MMOL/L (ref 98–107)
CO2 SERPL-SCNC: 28 MMOL/L (ref 21–32)
CREAT SERPL-MCNC: 1.1 MG/DL (ref 0.7–1.3)
EST. AVERAGE GLUCOSE BLD GHB EST-MCNC: 177.2 MG/DL
GFR SERPL CREATININE-BSD FRML MDRD: 65 ML/MIN/1.73M*2
GLUCOSE SERPL-MCNC: 155 MG/DL (ref 70–105)
HBA1C MFR BLD: 7.8 % (ref 4–6)
POTASSIUM SERPL-SCNC: 5 MMOL/L (ref 3.6–5)
PROT SERPL-MCNC: 7.8 G/DL (ref 6.3–8.6)
SODIUM SERPL-SCNC: 137 MMOL/L (ref 135–145)

## 2021-05-05 PROCEDURE — 83036 HEMOGLOBIN GLYCOSYLATED A1C: CPT | Performed by: FAMILY MEDICINE

## 2021-05-05 PROCEDURE — 80053 COMPREHEN METABOLIC PANEL: CPT | Performed by: FAMILY MEDICINE

## 2021-05-05 PROCEDURE — 36415 COLL VENOUS BLD VENIPUNCTURE: CPT | Mod: NCP | Performed by: FAMILY MEDICINE

## 2021-05-06 DIAGNOSIS — E11.9 TYPE 2 DIABETES MELLITUS WITHOUT COMPLICATION, WITHOUT LONG-TERM CURRENT USE OF INSULIN (CMS/HCC): ICD-10-CM

## 2021-05-06 RX ORDER — METFORMIN HYDROCHLORIDE 1000 MG/1
TABLET ORAL
Qty: 180 TABLET | Refills: 0 | Status: SHIPPED | OUTPATIENT
Start: 2021-05-06 | End: 2021-08-10

## 2021-05-06 NOTE — TELEPHONE ENCOUNTER
Abhilash is out of his Metformin, can we send in a RX/Refill ASAP. Please call Abhilash if any questions or when his RX has been sent in. Abhilash is needing his RX sent to [facility].

## 2021-05-18 DIAGNOSIS — E11.9 TYPE 2 DIABETES MELLITUS WITHOUT COMPLICATION, WITHOUT LONG-TERM CURRENT USE OF INSULIN (CMS/HCC): ICD-10-CM

## 2021-05-19 RX ORDER — EMPAGLIFLOZIN 25 MG/1
TABLET, FILM COATED ORAL
Qty: 90 TABLET | Refills: 0 | Status: SHIPPED | OUTPATIENT
Start: 2021-05-19 | End: 2021-08-27 | Stop reason: SDUPTHER

## 2021-06-01 VITALS — WEIGHT: 239 LBS | HEIGHT: 73 IN | BODY MASS INDEX: 31.68 KG/M2

## 2021-06-02 VITALS — BODY MASS INDEX: 30.61 KG/M2 | HEIGHT: 72 IN | WEIGHT: 226 LBS

## 2021-06-08 DIAGNOSIS — K21.9 GASTROESOPHAGEAL REFLUX DISEASE, UNSPECIFIED WHETHER ESOPHAGITIS PRESENT: ICD-10-CM

## 2021-06-08 RX ORDER — DEXLANSOPRAZOLE 60 MG/1
CAPSULE, DELAYED RELEASE ORAL
Qty: 90 CAPSULE | Refills: 0 | Status: SHIPPED | OUTPATIENT
Start: 2021-06-08 | End: 2021-08-31 | Stop reason: SDUPTHER

## 2021-06-16 PROBLEM — E11.42 DIABETIC POLYNEUROPATHY ASSOCIATED WITH TYPE 2 DIABETES MELLITUS (H): Status: ACTIVE | Noted: 2018-09-11

## 2021-06-18 NOTE — LETTER
Letter by Wing Whitney DO at      Author: Wing Whitney DO Service: -- Author Type: --    Filed:  Encounter Date: 1/4/2019 Status: (Other)       Jose Luis [address]  [facility] [address]      January 4, 2019      Dear Jose Luis,    This letter is to remind you that you will be due for your follow up appointment with Dr. Wing Whitney  . To help ensure you are in the best health possible, a regular follow-up with your cardiologist is essential.     Please call our Patient Scheduling Line at [phone] to schedule your appointment at your earliest convenience.  If you have recently scheduled an appointment, please disregard this letter.    We look forward to seeing you again. As always, we are available at the number  above for any questions or concerns you may have.      Sincerely,     The Physicians and Staff of [facility]

## 2021-06-21 DIAGNOSIS — I10 ESSENTIAL HYPERTENSION: ICD-10-CM

## 2021-06-21 RX ORDER — LISINOPRIL 5 MG/1
TABLET ORAL
Qty: 90 TABLET | Refills: 0 | Status: SHIPPED | OUTPATIENT
Start: 2021-06-21 | End: 2021-08-31 | Stop reason: SDUPTHER

## 2021-06-26 NOTE — PROGRESS NOTES
Progress Notes by Wing Whitney DO at 1/29/2018  8:10 AM     Author: Wing Whitney DO Service: -- Author Type: Physician    Filed: 1/29/2018  8:47 AM Encounter Date: 1/29/2018 Status: Signed    : Wing Whitney DO (Physician)           Click to link to [facility]     [facility] HEART CARE NOTE    Assessment/Recommendations   Assessment:    1. Ischemic Cardiomyopathy with mild LV systolic dysfunction (LVEF:45%, NYHA II)  2. Multivessel coronary artery disease s/p CABG ( LIMA to LAD, SVG to OM, SVG to DIAG, SVG to PDA) on 1/4/2016.   3. Hypertension (essential)   4. Hyperlipidemia   5. Diabetes mellitus(non-insulin-dependent).   6. New onset anginal symptoms including exertional dyspnea, chest pressure (Mild) pre patient for past 6 months.     Recommendations:   -Best option with the patient including angiogram at this time or trial of medical therapy with Imdur.   Patient states he has to return to [place] day and will not be able to go through further testing.  He is willing to try Imdur 30 mg daily.  If he has no improvement over the next 3-4 days he will call the clinic we will schedule an angiogram with possible percutaneous coronary mention of his coronary arteries.  - Metoprolol 25 mg twice a day   - Restart atorvastatin daily   - Continue aspirin and plavix   - Lisinopril 5 mg daily       History of Present Illness    Mr. Jose Luis Singh is a 73 y.o. male with coronary artery disease status recent coronary artery bypass surgery presenting follow-up for coronary artery disease and ischemic cardiomyopathy.      On January 4, 2016 patient underwent successful coronary artery bypass surgery for unstable angina and non-ST elevated myocardial infarction.  He had uncomplicated course.     Initial follow-up after surgery the patient noted complete resolution of his symptoms.  Over the past 6 months he has noticed return of his prior cardiac symptoms though they are  much milder than before.  Currently has dyspnea with moderate to strenuous activities and some jaw pain.  He also gets a pressure across his chest with moderate to strenuous activities which he says is new for the past 6 months.    He also stopped his atorvastatin about a month ago for muscle cramps in the back of his legs.  He states muscle cramps continue despite stopping statin therapy.    On discussion with the patient including his daughter and wife about need for further testing.  At this time the patient has to return to [facility] given [place]  The plan will be for the patient to trial Imdur 30 mg daily.  If he has any ongoing symptoms will need to return for an angiogram with possible percutaneous intervention.      He is actually being treated for acid reflux.      Echocardiogram: LVEF: 40-45%  Summary   Mild eccentric left ventricular hypertrophy is present.   Poor image quality precludes estimation of the left ventricular ejection   fraction but suspect mild to moderate systolic dysfunction.   The inferior to inferolateral wall appears severely hypokinetic. There is   also hypokinesis of the apex.     Coronary Angiogram: 12/30/2015   Procedure Summary   69 yo male iwth DM and CAD presents iwth unstable angina.   Angiography found normal LM, severe dz in mid and distal LAD;   100% OM2 and 100% mid RCA.     Physical Examination Review of Systems   Vitals:    01/29/18 0813   BP: 130/82   Pulse: 80   Resp: 16     Body mass index is 31.97 kg/(m^2).  Wt Readings from Last 3 Encounters:   01/29/18 (!) 239 lb (108.4 kg)   03/04/16 (!) 233 lb 3.2 oz (105.8 kg)   01/19/16 (!) 224 lb 11.2 oz (101.9 kg)       General Appearance:   no distress, obese body habitus   ENT/Mouth: membranes moist, no oral lesions or bleeding gums.      EYES:  no scleral icterus, normal conjunctivae   Neck: no carotid bruits or thyromegaly   Chest/Lungs:   lungs are clear to auscultation, no rales or wheezing,  noted sternal scar, equal chest wall expansion    Cardiovascular:   Regular. Normal first and second heart sounds with no murmurs, rubs, or gallops; the carotid, radial and posterior tibial pulses are intact, Jugular venous pressure normal, no pitting edema bilaterally    Abdomen:  no organomegaly, masses, bruits, or tenderness; bowel sounds are present   Extremities: no cyanosis or clubbing   Skin: no xanthelasma, warm.    Neurologic: normal gait, normal  bilateral, no tremors     Psychiatric: alert and oriented x3, calm     General: Night Sweats  Eyes: WNL  Ears/Nose/Throat: WNL  Lungs: Shortness of Breath  Heart: Arm Pain, Chest Pain  Stomach: Nausea, Heartburn  Bladder: Frequent Urination at Night  Muscle/Joints: Muscle Weakness, Muscle Pain  Skin: WNL  Nervous System: Daytime Sleepiness  Mental Health: WNL     Blood: WNL       Medical History  Surgical History Family History Social History   Past Medical History:   Diagnosis Date   ? Coronary artery disease    ? Diabetes mellitus    ? Hyperlipidemia    ? Hypertension     Past Surgical History:   Procedure Laterality Date   ? CARDIAC CATHETERIZATION     ? CATARACT EXTRACTION     ? CHOLECYSTECTOMY     ? CORONARY ARTERY BYPASS GRAFT N/A 1/4/2016    Procedure: CORONARY ARTERY BYPASS GRAFT X 4   WITH INTERNAL MAMMARY ARTERY, ENDOSCOPIC VEIN HARVEST TRANSESOPHAGEAL ECHOCARDIOGRAM;  Surgeon: Zita Ramos MD;  Location: [facility];  Service:    ? CORONARY STENT PLACEMENT  1997     one vessel two BMS.     Family History   Problem Relation Age of Onset   ? Coronary artery disease Mother    ? CABG Mother    ? Coronary artery disease Father 48    Social History     Social History   ? Marital status:      Spouse name: Jenni   ? Number of children: N/A   ? Years of education: N/A     Occupational History   ? 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 Smoking status: Former Smoker     Types: Cigarettes   ? Smokeless  tobacco: Never Used   ? Alcohol use Not on file   ? Drug use: No   ? Sexual activity: Not on file     Other Topics Concern   ? Not on file     Social History Narrative   ? No narrative on file          Medications  Allergies   Current Outpatient Prescriptions   Medication Sig Dispense Refill   ? aspirin 81 MG EC tablet Take 81 mg by mouth daily.     ? atorvastatin (LIPITOR) 80 MG tablet Take 1 tablet (80 mg total) by mouth daily. 90 tablet 1   ? clopidogrel (PLAVIX) 75 mg tablet Take 1 tablet (75 mg total) by mouth daily. 90 tablet 1   ? esomeprazole (NEXIUM) 40 MG capsule Take 40 mg by mouth daily as needed.     ? linagliptin 5 mg Tab Take 5 mg by mouth daily.      ? lisinopril (PRINIVIL,ZESTRIL) 5 MG tablet Take 1 tablet (5 mg total) by mouth daily. 30 tablet 11   ? metFORMIN (GLUCOPHAGE) 500 MG tablet 1 tab AM, 2 tab PM     ? metoprolol tartrate (LOPRESSOR) 25 MG tablet Take 1 tablet (25 mg total) by mouth 2 (two) times a day. 180 tablet 1   ? senna-docusate (PERICOLACE) 8.6-50 mg tablet Take 1 tablet by mouth daily as needed for constipation.       No current facility-administered medications for this visit.       Allergies   Allergen Reactions   ? Amoxicillin Diarrhea and Nausea And Vomiting         Lab Results    Chemistry/lipid CBC Cardiac Enzymes/BNP/TSH/INR   Lab Results   Component Value Date    CHOL 180 12/30/2015    HDL 28 (L) 12/30/2015    LDLCALC  12/30/2015      Comment:      Invalid, Triglycerides >400    TRIG 453 (H) 12/30/2015    CREATININE 0.92 01/08/2016    BUN 18 01/08/2016    K 3.9 01/08/2016     01/08/2016    CL 99 01/08/2016    CO2 30 01/08/2016    Lab Results   Component Value Date    WBC 7.7 01/07/2016    HGB 12.4 (L) 01/07/2016    HCT 35.7 (L) 01/07/2016    MCV 94 01/07/2016     (L) 01/07/2016    Lab Results   Component Value Date    TROPONINI 0.92 (HH) 12/31/2015    INR 1.24 (H) 01/05/2016

## 2021-06-26 NOTE — PROGRESS NOTES
Progress Notes by Wing Whitney DO at 9/11/2018  1:30 PM     Author: Wing Whitney DO Service: -- Author Type: Physician    Filed: 9/11/2018  5:06 PM Encounter Date: 9/11/2018 Status: Signed    : Wing Whitney DO (Physician)           Click to link to [facility]     [facility] HEART CARE NOTE    Assessment/Recommendations   Assessment:    1. Ischemic Cardiomyopathy with mild LV systolic dysfunction (LVEF:45%, NYHA early II)  2. Multivessel coronary artery disease s/p CABG (LIMA to LAD, SVG to OM, SVG to DIAG, SVG to PDA) on 1/4/2016 with chronic anginal chest pain.   3. Hypertension (essential)   4. Hyperlipidemia, on high-dose atorvastatin  5. Diabetes mellitus, type II  (non-insulin-dependent) with neuropathy    Recommendations:   -Last evaluation the patient was started on Imdur 30 mg daily.  He says this resulted in resolution of his chest pain symptoms.  He stopped the medication the summer after feeling lightheaded has noticed a return of his chest pain symptoms.  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  Advised resume Imdur if he has ongoing continued chest pain despite Imdur will likely need further evaluation such as stress testing or coronary angiogram.  - Metoprolol 25 mg twice a day   - Restart atorvastatin daily 80 mg.   - Continue aspirin and plavix for advanced CAD.   - Lisinopril 5 mg daily for HTN and DM  - Gabapentin low dose for neuropathic pain.            History of Present Illness    Mr. Jose Luis Singh is a 73 y.o. male with coronary artery disease status recent coronary artery bypass surgery presenting follow-up for coronary artery disease and ischemic cardiomyopathy.      On January 4, 2016 patient underwent successful coronary artery bypass surgery for unstable angina and non-ST elevated myocardial infarction.  He had uncomplicated course.     Since being evaluated in February 2018 the  patient has noted ongoing intermittent chest pain symptoms particularly after stopping his Imdur therapy.  His chest pain occurs with levels of high stress radiates to his left arm resolves with rest.  The spring we trialed Imdur 30 mg daily which resulted in complete resolution of his symptoms.  Or the summer for some reason he discontinued this given one episode of lightheadedness.  Continues to maintain his metoprolol therapy and a atorvastatin therapy.  Currently is on aspirin and Plavix.    His other main concern remains his advanced peripheral neuropathy related to his diabetes.  His A1c is 8.7 is followed by her primary care provider.    Echocardiogram: LVEF: 40-45%  Summary   Mild eccentric left ventricular hypertrophy is present.   Poor image quality precludes estimation of the left ventricular ejection   fraction but suspect mild to moderate systolic dysfunction.   The inferior to inferolateral wall appears severely hypokinetic. There is   also hypokinesis of the apex.     Coronary Angiogram: 12/30/2015   Procedure Summary   69 yo male iwth DM and CAD presents iwth unstable angina.   Angiography found normal LM, severe dz in mid and distal LAD;   100% OM2 and 100% mid RCA.     Physical Examination Review of Systems   Vitals:    09/11/18 1321   BP: 136/74   Pulse: 100   Resp: 18     Body mass index is 30.65 kg/(m^2).  Wt Readings from Last 3 Encounters:   09/11/18 (!) 226 lb (102.5 kg)   01/29/18 (!) 239 lb (108.4 kg)   03/04/16 (!) 233 lb 3.2 oz (105.8 kg)       General Appearance:   no distress, obese body habitus   ENT/Mouth: membranes moist, no oral lesions or bleeding gums.      EYES:  no scleral icterus, normal conjunctivae   Neck: no carotid bruits or thyromegaly   Chest/Lungs:   lungs are clear to auscultation, no rales or wheezing, noted sternal scar, equal chest wall expansion    Cardiovascular:   Regular. Normal first and second heart sounds with no murmurs, rubs, or gallops; the carotid, radial  and posterior tibial pulses are intact, Jugular venous pressure normal, no pitting edema bilaterally.    Abdomen:  no tenderness; bowel sounds are present   Extremities: no cyanosis or clubbing   Skin: no xanthelasma, warm.    Neurologic: normal gait, normal  bilateral, no tremors     Psychiatric: alert and oriented x3, calm     General: Night Sweats  Eyes: WNL  Ears/Nose/Throat: WNL  Lungs: WNL  Heart: WNL  Stomach: WNL  Bladder: Frequent Urination at Night  Muscle/Joints: WNL  Skin: WNL  Nervous System: Daytime Sleepiness  Mental Health: WNL     Blood: WNL       Medical History  Surgical History Family History Social History   Past Medical History:   Diagnosis Date   ? Coronary artery disease    ? Diabetes mellitus (H)    ? Hyperlipidemia    ? Hypertension     Past Surgical History:   Procedure Laterality Date   ? CARDIAC CATHETERIZATION     ? CATARACT EXTRACTION     ? CHOLECYSTECTOMY     ? CORONARY ARTERY BYPASS GRAFT N/A 1/4/2016    Procedure: CORONARY ARTERY BYPASS GRAFT X 4   WITH INTERNAL MAMMARY ARTERY, ENDOSCOPIC VEIN HARVEST TRANSESOPHAGEAL ECHOCARDIOGRAM;  Surgeon: Zita Ramos MD;  Location: [facility];  Service:    ? CORONARY STENT PLACEMENT  1997     one vessel two BMS.     Family History   Problem Relation Age of Onset   ? Coronary artery disease Mother    ? CABG Mother    ? Coronary artery disease Father 48    Social History     Social History   ? Marital status:      Spouse name: Jenni   ? Number of children: N/A   ? 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 Smoking status: Former Smoker     Types: Cigarettes   ? Smokeless tobacco: Never Used   ? Alcohol use Not on file   ? Drug use: No   ? Sexual activity: Not on file     Other Topics Concern   ? Not on file     Social History Narrative          Medications  Allergies   Current Outpatient Prescriptions   Medication Sig Dispense Refill    ? aspirin 81 MG EC tablet Take 81 mg by mouth daily.     ? atorvastatin (LIPITOR) 80 MG tablet Take 1 tablet (80 mg total) by mouth daily. 90 tablet 1   ? canagliflozin (INVOKANA) 300 mg Tab Take 150 mg by mouth daily before breakfast.     ? clopidogrel (PLAVIX) 75 mg tablet Take 1 tablet (75 mg total) by mouth daily. 90 tablet 1   ? dexlansoprazole (DEXILANT) 60 mg capsule Take 60 mg by mouth daily.     ? lisinopril (PRINIVIL,ZESTRIL) 5 MG tablet Take 1 tablet (5 mg total) by mouth daily. 30 tablet 11   ? metFORMIN (GLUCOPHAGE) 500 MG tablet Take 1,000 mg by mouth 2 (two) times a day with meals.      ? metoprolol tartrate (LOPRESSOR) 25 MG tablet Take 1 tablet (25 mg total) by mouth 2 (two) times a day. 180 tablet 1   ? esomeprazole (NEXIUM) 40 MG capsule Take 40 mg by mouth daily as needed.     ? isosorbide mononitrate (IMDUR) 30 MG 24 hr tablet Take 1 tablet (30 mg total) by mouth daily. 90 tablet 3   ? linagliptin 5 mg Tab Take 5 mg by mouth daily.      ? OMEGA-3/DHA/EPA/FISH OIL (FISH OIL-OMEGA-3 FATTY ACIDS) 300-1,000 mg capsule Take 2 g by mouth 2 (two) times a day.     ? senna-docusate (PERICOLACE) 8.6-50 mg tablet Take 1 tablet by mouth daily as needed for constipation.       No current facility-administered medications for this visit.       Allergies   Allergen Reactions   ? Amoxicillin Diarrhea and Nausea And Vomiting   ? Penicillins Nausea Only     HIVES, NAUSEA         Lab Results    Chemistry/lipid CBC Cardiac Enzymes/BNP/TSH/INR   Lab Results   Component Value Date    CHOL 180 12/30/2015    HDL 28 (L) 12/30/2015    LDLCALC  12/30/2015      Comment:      Invalid, Triglycerides >400    TRIG 453 (H) 12/30/2015    CREATININE 0.92 01/08/2016    BUN 18 01/08/2016    K 3.9 01/08/2016     01/08/2016    CL 99 01/08/2016    CO2 30 01/08/2016    Lab Results   Component Value Date    WBC 7.7 01/07/2016    HGB 12.4 (L) 01/07/2016    HCT 35.7 (L) 01/07/2016    MCV 94 01/07/2016     (L) 01/07/2016     Lab Results   Component Value Date    TROPONINI 0.92 (HH) 12/31/2015    INR 1.24 (H) 01/05/2016

## 2021-08-10 DIAGNOSIS — E11.9 TYPE 2 DIABETES MELLITUS WITHOUT COMPLICATION, WITHOUT LONG-TERM CURRENT USE OF INSULIN (CMS/HCC): ICD-10-CM

## 2021-08-10 DIAGNOSIS — E11.9 TYPE 2 DIABETES MELLITUS WITHOUT COMPLICATION, WITHOUT LONG-TERM CURRENT USE OF INSULIN (CMS/HCC): Primary | ICD-10-CM

## 2021-08-10 RX ORDER — METFORMIN HYDROCHLORIDE 1000 MG/1
1000 TABLET ORAL 2 TIMES DAILY WITH MEALS
Qty: 180 TABLET | Refills: 0 | Status: SHIPPED | OUTPATIENT
Start: 2021-08-10 | End: 2021-08-31 | Stop reason: SDUPTHER

## 2021-08-21 ENCOUNTER — HEALTH MAINTENANCE LETTER (OUTPATIENT)
Age: 76
End: 2021-08-21

## 2021-08-26 ENCOUNTER — APPOINTMENT (OUTPATIENT)
Dept: LAB | Facility: CLINIC | Age: 76
End: 2021-08-26
Payer: MEDICARE

## 2021-08-26 DIAGNOSIS — E11.9 TYPE 2 DIABETES MELLITUS WITHOUT COMPLICATION, WITHOUT LONG-TERM CURRENT USE OF INSULIN (CMS/HCC): ICD-10-CM

## 2021-08-26 LAB
EST. AVERAGE GLUCOSE BLD GHB EST-MCNC: 188.6 MG/DL
HBA1C MFR BLD: 8.2 % (ref 4.8–6)

## 2021-08-26 PROCEDURE — 36415 COLL VENOUS BLD VENIPUNCTURE: CPT | Mod: NCP | Performed by: FAMILY MEDICINE

## 2021-08-26 PROCEDURE — 83036 HEMOGLOBIN GLYCOSYLATED A1C: CPT | Performed by: FAMILY MEDICINE

## 2021-08-27 DIAGNOSIS — E11.9 TYPE 2 DIABETES MELLITUS WITHOUT COMPLICATION, WITHOUT LONG-TERM CURRENT USE OF INSULIN (CMS/HCC): ICD-10-CM

## 2021-08-27 RX ORDER — EMPAGLIFLOZIN 25 MG/1
TABLET, FILM COATED ORAL
Qty: 90 TABLET | Refills: 0 | OUTPATIENT
Start: 2021-08-27

## 2021-08-27 RX ORDER — EMPAGLIFLOZIN 25 MG/1
1 TABLET, FILM COATED ORAL DAILY
Qty: 30 TABLET | Refills: 0 | Status: SHIPPED | OUTPATIENT
Start: 2021-08-27 | End: 2021-08-31 | Stop reason: SDUPTHER

## 2021-08-31 ENCOUNTER — OFFICE VISIT (OUTPATIENT)
Dept: FAMILY MEDICINE | Facility: CLINIC | Age: 76
End: 2021-08-31
Payer: MEDICARE

## 2021-08-31 VITALS
DIASTOLIC BLOOD PRESSURE: 80 MMHG | HEART RATE: 80 BPM | TEMPERATURE: 98.6 F | OXYGEN SATURATION: 94 % | RESPIRATION RATE: 18 BRPM | SYSTOLIC BLOOD PRESSURE: 138 MMHG | WEIGHT: 230 LBS | BODY MASS INDEX: 31.19 KG/M2

## 2021-08-31 DIAGNOSIS — K21.9 GASTROESOPHAGEAL REFLUX DISEASE, UNSPECIFIED WHETHER ESOPHAGITIS PRESENT: ICD-10-CM

## 2021-08-31 DIAGNOSIS — I25.118 CORONARY ARTERY DISEASE OF NATIVE ARTERY OF NATIVE HEART WITH STABLE ANGINA PECTORIS (CMS/HCC): ICD-10-CM

## 2021-08-31 DIAGNOSIS — E11.9 TYPE 2 DIABETES MELLITUS WITHOUT COMPLICATION, WITHOUT LONG-TERM CURRENT USE OF INSULIN (CMS/HCC): Primary | ICD-10-CM

## 2021-08-31 DIAGNOSIS — I10 ESSENTIAL HYPERTENSION: ICD-10-CM

## 2021-08-31 DIAGNOSIS — E78.5 HYPERLIPIDEMIA, UNSPECIFIED HYPERLIPIDEMIA TYPE: ICD-10-CM

## 2021-08-31 PROCEDURE — 99214 OFFICE O/P EST MOD 30 MIN: CPT | Performed by: FAMILY MEDICINE

## 2021-08-31 RX ORDER — CLOPIDOGREL BISULFATE 75 MG/1
75 TABLET ORAL DAILY
Qty: 90 TABLET | Refills: 3 | Status: SHIPPED | OUTPATIENT
Start: 2021-08-31 | End: 2022-09-26

## 2021-08-31 RX ORDER — EMPAGLIFLOZIN 25 MG/1
1 TABLET, FILM COATED ORAL DAILY
Qty: 90 TABLET | Refills: 3 | Status: SHIPPED | OUTPATIENT
Start: 2021-08-31 | End: 2022-09-16 | Stop reason: SDUPTHER

## 2021-08-31 RX ORDER — LISINOPRIL 5 MG/1
5 TABLET ORAL DAILY
Qty: 90 TABLET | Refills: 3 | Status: SHIPPED | OUTPATIENT
Start: 2021-08-31 | End: 2021-08-31

## 2021-08-31 RX ORDER — METFORMIN HYDROCHLORIDE 1000 MG/1
1000 TABLET ORAL 2 TIMES DAILY WITH MEALS
Qty: 180 TABLET | Refills: 1 | Status: SHIPPED | OUTPATIENT
Start: 2021-08-31 | End: 2022-05-16

## 2021-08-31 RX ORDER — METOPROLOL TARTRATE 25 MG/1
25 TABLET, FILM COATED ORAL 2 TIMES DAILY
Qty: 180 TABLET | Refills: 3 | Status: SHIPPED | OUTPATIENT
Start: 2021-08-31 | End: 2022-11-02 | Stop reason: SDUPTHER

## 2021-08-31 RX ORDER — LISINOPRIL 10 MG/1
10 TABLET ORAL DAILY
Qty: 90 TABLET | Refills: 3 | Status: SHIPPED | OUTPATIENT
Start: 2021-08-31 | End: 2022-09-20 | Stop reason: SDUPTHER

## 2021-08-31 RX ORDER — DEXLANSOPRAZOLE 60 MG/1
60 CAPSULE, DELAYED RELEASE ORAL DAILY
Qty: 90 CAPSULE | Refills: 3 | Status: SHIPPED | OUTPATIENT
Start: 2021-08-31 | End: 2022-09-12

## 2021-08-31 RX ORDER — ISOSORBIDE MONONITRATE 30 MG/1
30 TABLET, EXTENDED RELEASE ORAL DAILY
Status: CANCELLED | OUTPATIENT
Start: 2021-08-31

## 2021-08-31 RX ORDER — ATORVASTATIN CALCIUM 80 MG/1
80 TABLET, FILM COATED ORAL DAILY
Qty: 90 TABLET | Refills: 3 | Status: SHIPPED | OUTPATIENT
Start: 2021-08-31 | End: 2022-10-25

## 2021-08-31 ASSESSMENT — ENCOUNTER SYMPTOMS
DIZZINESS: 0
POLYPHAGIA: 0
APPETITE CHANGE: 0
TREMORS: 0
WOUND: 0
LIGHT-HEADEDNESS: 0
PALPITATIONS: 0
COUGH: 0
WEAKNESS: 0
ACTIVITY CHANGE: 0
DIFFICULTY URINATING: 0
UNEXPECTED WEIGHT CHANGE: 0
SHORTNESS OF BREATH: 0
CHEST TIGHTNESS: 0
HEADACHES: 0
FATIGUE: 0
DIAPHORESIS: 0

## 2021-08-31 NOTE — PROGRESS NOTES
Subjective      Abihlash Huggins is a 76 y.o. male who presents for diabetes follow-up.  Recent A1c was elevated above 8 this is high for the patient.  He continues to take Jardiance 25 mg daily and Metformin full strength.  He does admit he has been very careless with his diet lately and he can do better.  He otherwise has no complaints he does stay on top of going to the dentist regularly he has not went to his eye doctor within the year and plans on getting this scheduled.  Otherwise he is requesting a full supply of all of his medications he is out of refills on most of them.    HPI    The following have been reviewed and updated as appropriate in this visit:         Allergies   Allergen Reactions   • Amoxicillin      HIVES, NAUSEA   • Penicillins      HIVES, NAUSEA     Current Outpatient Medications   Medication Sig Dispense Refill   • atorvastatin (LIPITOR) 80 mg tablet Take 1 tablet (80 mg total) by mouth daily 90 tablet 3   • clopidogreL (PLAVIX) 75 mg tablet Take 1 tablet (75 mg total) by mouth daily 90 tablet 3   • dexlansoprazole (Dexilant) 60 mg capsule Take 1 capsule (60 mg total) by mouth daily 90 capsule 3   • empagliflozin (Jardiance) 25 mg tablet Take 1 tablet (25 mg total) by mouth daily 90 tablet 3   • metoprolol tartrate (LOPRESSOR) 25 mg tablet Take 1 tablet (25 mg total) by mouth 2 (two) times a day 180 tablet 3   • metFORMIN (GLUCOPHAGE) 1,000 mg tablet Take 1 tablet (1,000 mg total) by mouth 2 (two) times a day with meals 180 tablet 1   • lisinopriL (PRINIVIL,ZESTRIL) 10 mg tablet Take 1 tablet (10 mg total) by mouth daily 90 tablet 3   • clindamycin (Cleocin T) 1 % lotion Apply 1 application topically 2 (two) times a day 60 mL 0   • aspirin 81 mg EC tablet Take 81 mg by mouth daily.     • isosorbide mononitrate (IMDUR) 30 mg 24 hr tablet Take 30 mg by mouth daily Indications: patient only takes as needed if having pain       No current facility-administered medications for this visit.     Past  Medical History:   Diagnosis Date   • Diabetes mellitus (CMS/HCC) (HCC)    • Hypertension      Past Surgical History:   Procedure Laterality Date   • CARDIAC SURGERY  01/04/2016    Cardiac bypass X4 vessels   • CARDIAC SURGERY  01/01/1997    2 stents   • CATARACT EXTRACTION Left    • CHOLECYSTECTOMY  02/14/2007   • COLONOSCOPY      Approx 8 years ago with Dr Mcguire- was to have 5 year follow up   • COLONOSCOPY  01/01/2015    Polyps     Family History   Problem Relation Age of Onset   • Heart disease Mother    • Heart attack Father         Myocardial infarction   • Diabetes type II Brother         Toes removed due to diabetes     Social History     Occupational History   • Not on file   Tobacco Use   • Smoking status: Former Smoker     Packs/day: 0.00     Years: 20.00     Pack years: 0.00   • Smokeless tobacco: Never Used   Substance and Sexual Activity   • Alcohol use: Yes     Comment: Occasionally   • Drug use: No   • Sexual activity: Defer   Social History Narrative   • Not on file       Review of Systems   Constitutional: Negative for activity change, appetite change, diaphoresis, fatigue and unexpected weight change.   Respiratory: Negative for cough, chest tightness and shortness of breath.    Cardiovascular: Negative for chest pain, palpitations and leg swelling.   Endocrine: Negative for polyphagia and polyuria.   Genitourinary: Negative for difficulty urinating.   Skin: Negative for rash and wound.   Neurological: Negative for dizziness, tremors, weakness, light-headedness and headaches.     As noted in HPI    Objective   /80 (BP Location: Right arm, Patient Position: Sitting, Cuff Size: Regular Adult)   Pulse 80   Temp 37 °C (98.6 °F)   Resp 18   Wt 104.3 kg (230 lb)   SpO2 94%   BMI 31.19 kg/m²     Physical Exam  Constitutional:       Appearance: Normal appearance. He is normal weight.   Cardiovascular:      Rate and Rhythm: Normal rate and regular rhythm.      Pulses: Normal pulses.    Pulmonary:      Effort: Pulmonary effort is normal. No respiratory distress.      Breath sounds: Normal breath sounds. No wheezing, rhonchi or rales.   Abdominal:      General: Bowel sounds are normal.      Palpations: Abdomen is soft.   Musculoskeletal:         General: Normal range of motion.   Skin:     General: Skin is warm and dry.      Capillary Refill: Capillary refill takes less than 2 seconds.   Neurological:      General: No focal deficit present.      Mental Status: He is alert and oriented to person, place, and time. Mental status is at baseline.   Psychiatric:         Mood and Affect: Mood normal.         Behavior: Behavior normal.         Thought Content: Thought content normal.         Judgment: Judgment normal.         ASSESSMENT AND PLAN   Diagnoses and all orders for this visit:    Type 2 diabetes mellitus without complication, without long-term current use of insulin (CMS/[facility]) ([facility])  -     Hemoglobin A1c (glycosylated) Blood, Venous; Future  -     Urine Albumin/Creatinine Ratio Urine, Clean Catch; Future  -     atorvastatin (LIPITOR) 80 mg tablet; Take 1 tablet (80 mg total) by mouth daily  -     empagliflozin (Jardiance) 25 mg tablet; Take 1 tablet (25 mg total) by mouth daily  -     metFORMIN (GLUCOPHAGE) 1,000 mg tablet; Take 1 tablet (1,000 mg total) by mouth 2 (two) times a day with meals    Essential hypertension  -     CBC w/auto differential Blood, Venous; Future  -     Comprehensive metabolic panel Blood, Venous; Future  -     metoprolol tartrate (LOPRESSOR) 25 mg tablet; Take 1 tablet (25 mg total) by mouth 2 (two) times a day  -     lisinopriL (PRINIVIL,ZESTRIL) 10 mg tablet; Take 1 tablet (10 mg total) by mouth daily    Hyperlipidemia, unspecified hyperlipidemia type  -     Lipid panel Blood, Venous; Future  -     atorvastatin (LIPITOR) 80 mg tablet; Take 1 tablet (80 mg total) by mouth daily    Coronary artery disease of native artery of native heart with stable angina pectoris  (CMS/HCC) (HCC)  -     clopidogreL (PLAVIX) 75 mg tablet; Take 1 tablet (75 mg total) by mouth daily    Gastroesophageal reflux disease, unspecified whether esophagitis present  -     dexlansoprazole (Dexilant) 60 mg capsule; Take 1 capsule (60 mg total) by mouth daily      Chronic medications renewed    I did advise third medication with A1c above 8.  Patient would like to try a strict dietary changes as he knows he can improve on this.  We will do 3-month trial of diet changes he will continue Metformin and Jardiance at current doses    Also increasing his lisinopril to 10 mg daily    Plan for full fasting panel and labs in 3 months    Follow up 3 months.   Karli Brito MD      A voice recognition program may have been used to aid in documentation. Words are not always transcribed exactly as spoken. Errors may be present despite efforts to correct and should be taken within the context of the discussion. Please contact the provider if you need assistance interpreting this documentation

## 2021-10-16 ENCOUNTER — HEALTH MAINTENANCE LETTER (OUTPATIENT)
Age: 76
End: 2021-10-16

## 2022-04-02 ENCOUNTER — HEALTH MAINTENANCE LETTER (OUTPATIENT)
Age: 77
End: 2022-04-02

## 2022-05-13 DIAGNOSIS — E11.9 TYPE 2 DIABETES MELLITUS WITHOUT COMPLICATION, WITHOUT LONG-TERM CURRENT USE OF INSULIN (CMS/HCC): ICD-10-CM

## 2022-05-13 NOTE — TELEPHONE ENCOUNTER
Care Due:                  Date            Visit Type   Department     Provider  --------------------------------------------------------------------------------                              DIABETIC     [id number]  Last Visit: 08-      FOLLOW UP    MEDICINE       CAROL JENKINS  Next Visit: None Scheduled  None         None Found                                                            Last  Test          Frequency    Reason                     Performed    Due Date  --------------------------------------------------------------------------------  Office Visit  6 months...  clopidogreL,               08- 02-                             empagliflozin, metFORMIN.    CBC.........  6 months...  clopidogreL..............  Not Found    Overdue    CMP.........  6 months...  clopidogreL,               Not Found    Overdue                             empagliflozin,                             lisinopriL, metFORMIN....    HBA1C.......  6 months...  empagliflozin, metFORMIN.  08- 02-    Lipid Panel.  12 months..  atorvastatin.............  Not Found    Overdue    [employer] Embedded Care Gaps. Reference number: [id number]. 5/13/2022 8:03:33 AM RYAN

## 2022-05-16 RX ORDER — METFORMIN HYDROCHLORIDE 1000 MG/1
1000 TABLET ORAL 2 TIMES DAILY WITH MEALS
Qty: 60 TABLET | Refills: 0 | Status: SHIPPED | OUTPATIENT
Start: 2022-05-16 | End: 2022-06-13 | Stop reason: SDUPTHER

## 2022-06-08 ENCOUNTER — APPOINTMENT (OUTPATIENT)
Dept: LAB | Facility: CLINIC | Age: 77
End: 2022-06-08
Payer: MEDICARE

## 2022-06-08 DIAGNOSIS — I10 ESSENTIAL HYPERTENSION: ICD-10-CM

## 2022-06-08 DIAGNOSIS — E11.9 TYPE 2 DIABETES MELLITUS WITHOUT COMPLICATION, WITHOUT LONG-TERM CURRENT USE OF INSULIN (CMS/HCC): ICD-10-CM

## 2022-06-08 DIAGNOSIS — E78.5 HYPERLIPIDEMIA, UNSPECIFIED HYPERLIPIDEMIA TYPE: ICD-10-CM

## 2022-06-08 LAB
ALBUMIN SERPL-MCNC: 4.4 G/DL (ref 3.3–5.5)
ALP SERPL-CCNC: 65 U/L (ref 45–115)
ALT SERPL-CCNC: 29 U/L (ref 10–47)
ANION GAP SERPL CALC-SCNC: 12 MMOL/L (ref 3–11)
AST SERPL-CCNC: 28 U/L
BASOPHILS # BLD AUTO: 0.01 10*3/UL
BASOPHILS NFR BLD AUTO: 0 % (ref 0–2)
BILIRUB SERPL-MCNC: 1.2 MG/DL (ref 0.2–1.4)
BUN SERPL-MCNC: 20 MG/DL (ref 7–25)
CALCIUM ALBUM COR SERPL-MCNC: 9.9 MG/DL (ref 8.6–10.3)
CALCIUM SERPL-MCNC: 10.2 MG/DL (ref 8.6–10.3)
CHLORIDE SERPL-SCNC: 99 MMOL/L (ref 98–107)
CHOLEST SERPL-MCNC: 125 MG/DL (ref 0–199)
CO2 SERPL-SCNC: 28 MMOL/L (ref 21–32)
CREAT SERPL-MCNC: 0.9 MG/DL (ref 0.7–1.3)
CREAT UR-MCNC: 45 MG/DL
EOSINOPHIL # BLD AUTO: 0.12 10*3/UL
EOSINOPHIL NFR BLD AUTO: 2 % (ref 0–3)
ERYTHROCYTE [DISTWIDTH] IN BLOOD BY AUTOMATED COUNT: 13.4 % (ref 11.5–15)
EST. AVERAGE GLUCOSE BLD GHB EST-MCNC: 177.2 MG/DL
FASTING STATUS PATIENT QL REPORTED: YES
GFR SERPL CREATININE-BSD FRML MDRD: 88 ML/MIN/1.73M*2
GLUCOSE SERPL-MCNC: 196 MG/DL (ref 70–105)
HBA1C MFR BLD: 7.8 % (ref 4.8–6)
HCT VFR BLD AUTO: 48 % (ref 38–50)
HDLC SERPL-MCNC: 26 MG/DL
HGB BLD-MCNC: 16.2 G/DL (ref 13.2–17.2)
LYMPHOCYTES # BLD AUTO: 1.38 10*3/UL
LYMPHOCYTES NFR BLD AUTO: 25 % (ref 15–47)
MCH RBC QN AUTO: 32.2 PG (ref 29–34)
MCHC RBC AUTO-ENTMCNC: 33.8 G/DL (ref 32–36)
MCV RBC AUTO: 95.2 FL (ref 82–97)
MICROALBUMIN UR-MCNC: 15.6 MG/L (ref 0–30)
MICROALBUMIN/CREAT UR: 34.7 MG/G CREAT (ref 0–34)
MONOCYTES # BLD AUTO: 0.4 10*3/UL
MONOCYTES NFR BLD AUTO: 7 % (ref 5–13)
NEUTROPHILS # BLD AUTO: 3.58 10*3/UL
NEUTROPHILS NFR BLD AUTO: 65 % (ref 46–70)
PLATELET # BLD AUTO: 146 10*3/UL (ref 130–350)
PMV BLD AUTO: 7.8 FL (ref 6.9–10.8)
POTASSIUM SERPL-SCNC: 5.1 MMOL/L (ref 3.5–5.1)
PROT SERPL-MCNC: 8.2 G/DL (ref 6.4–8.1)
RBC # BLD AUTO: 5.04 10*6/ΜL (ref 4.1–5.8)
SODIUM SERPL-SCNC: 139 MMOL/L (ref 128–145)
TRIGL SERPL-MCNC: 540 MG/DL
WBC # BLD AUTO: 5.5 10*3/UL (ref 3.7–9.6)

## 2022-06-08 PROCEDURE — 82570 ASSAY OF URINE CREATININE: CPT | Performed by: FAMILY MEDICINE

## 2022-06-08 PROCEDURE — 82043 UR ALBUMIN QUANTITATIVE: CPT | Performed by: FAMILY MEDICINE

## 2022-06-08 PROCEDURE — 83036 HEMOGLOBIN GLYCOSYLATED A1C: CPT | Performed by: FAMILY MEDICINE

## 2022-06-08 PROCEDURE — 85025 COMPLETE CBC W/AUTO DIFF WBC: CPT | Performed by: FAMILY MEDICINE

## 2022-06-08 PROCEDURE — 80061 LIPID PANEL: CPT | Mod: CMS | Performed by: FAMILY MEDICINE

## 2022-06-08 PROCEDURE — 80053 COMPREHEN METABOLIC PANEL: CPT | Performed by: FAMILY MEDICINE

## 2022-06-08 PROCEDURE — 36415 COLL VENOUS BLD VENIPUNCTURE: CPT | Mod: NCP | Performed by: FAMILY MEDICINE

## 2022-06-13 DIAGNOSIS — E11.9 TYPE 2 DIABETES MELLITUS WITHOUT COMPLICATION, WITHOUT LONG-TERM CURRENT USE OF INSULIN (CMS/HCC): ICD-10-CM

## 2022-06-13 RX ORDER — METFORMIN HYDROCHLORIDE 1000 MG/1
1000 TABLET ORAL 2 TIMES DAILY WITH MEALS
Qty: 60 TABLET | Refills: 0 | Status: SHIPPED | OUTPATIENT
Start: 2022-06-13 | End: 2022-06-16 | Stop reason: ALTCHOICE

## 2022-06-16 ENCOUNTER — OFFICE VISIT (OUTPATIENT)
Dept: FAMILY MEDICINE | Facility: CLINIC | Age: 77
End: 2022-06-16
Payer: MEDICARE

## 2022-06-16 VITALS
TEMPERATURE: 98.3 F | OXYGEN SATURATION: 94 % | DIASTOLIC BLOOD PRESSURE: 60 MMHG | BODY MASS INDEX: 29.43 KG/M2 | HEART RATE: 76 BPM | SYSTOLIC BLOOD PRESSURE: 122 MMHG | RESPIRATION RATE: 18 BRPM | WEIGHT: 217 LBS

## 2022-06-16 DIAGNOSIS — G62.9 NEUROPATHY: ICD-10-CM

## 2022-06-16 DIAGNOSIS — E11.9 TYPE 2 DIABETES MELLITUS WITHOUT COMPLICATION, WITHOUT LONG-TERM CURRENT USE OF INSULIN (CMS/HCC): Primary | ICD-10-CM

## 2022-06-16 DIAGNOSIS — N40.1 BENIGN PROSTATIC HYPERPLASIA WITH URINARY FREQUENCY: ICD-10-CM

## 2022-06-16 DIAGNOSIS — R35.0 BENIGN PROSTATIC HYPERPLASIA WITH URINARY FREQUENCY: ICD-10-CM

## 2022-06-16 PROCEDURE — 99214 OFFICE O/P EST MOD 30 MIN: CPT | Performed by: FAMILY MEDICINE

## 2022-06-16 RX ORDER — TAMSULOSIN HYDROCHLORIDE 0.4 MG/1
0.4 CAPSULE ORAL
Qty: 30 CAPSULE | Refills: 0 | Status: SHIPPED | OUTPATIENT
Start: 2022-06-16 | End: 2022-08-01

## 2022-06-16 RX ORDER — AMITRIPTYLINE HYDROCHLORIDE 10 MG/1
10 TABLET, FILM COATED ORAL NIGHTLY
Qty: 30 TABLET | Refills: 0 | Status: SHIPPED | OUTPATIENT
Start: 2022-06-16 | End: 2022-09-22 | Stop reason: ALTCHOICE

## 2022-06-16 RX ORDER — METFORMIN HYDROCHLORIDE 500 MG/1
500 TABLET, EXTENDED RELEASE ORAL 2 TIMES DAILY
Qty: 180 TABLET | Refills: 0 | Status: SHIPPED | OUTPATIENT
Start: 2022-06-16 | End: 2022-09-23

## 2022-06-16 ASSESSMENT — ENCOUNTER SYMPTOMS
DIARRHEA: 0
FREQUENCY: 1
SHORTNESS OF BREATH: 0
WEAKNESS: 0
NAUSEA: 0
FEVER: 0
FATIGUE: 0
SLEEP DISTURBANCE: 0
LIGHT-HEADEDNESS: 0
DIZZINESS: 0
NUMBNESS: 1
UNEXPECTED WEIGHT CHANGE: 0
DIFFICULTY URINATING: 1
CHILLS: 0
HEADACHES: 0
PALPITATIONS: 0
DYSURIA: 0

## 2022-06-16 NOTE — PROGRESS NOTES
Subjective      Abhilash Huggins is a 77 y.o. male who presents for diabetes follow-up.  Patient's recent A1c last week was 7.8 prior to this it was checked 9 months ago and it was 8.2.  Patient has been off metformin for 1 month as he ran out he was on 1000 mg twice daily.  He continues the Jardiance once daily.  He does not check his blood sugars at home overall just does not eat as much as he used to.  He did notice a significant improvement he did not have headaches and felt his energy improved after he had stopped taking the metformin.    He also mentions tingling and burning in his feet at night topical gel helps but then he wakes up around 3 or 4 again and sometimes has trouble falling back asleep.  He does recall a familiar medication called gabapentin and Lyrica he knows the Lyrica made him have bad nightmares.    He also has worsening symptoms of not completely emptying his bladder or some urinary frequency.  A while back when symptoms were not as severe he had trialed Flomax and did not notice much of a difference.    .    HPI    The following have been reviewed and updated as appropriate in this visit:            Allergies   Allergen Reactions   • Amoxicillin      HIVES, NAUSEA   • Penicillins      HIVES, NAUSEA     Current Outpatient Medications   Medication Sig Dispense Refill   • metFORMIN XR (GLUCOPHAGE-XR) 500 mg 24 hr tablet Take 1 tablet (500 mg total) by mouth 2 (two) times a day 180 tablet 0   • tamsulosin (FLOMAX) 0.4 mg capsule Take 1 capsule (0.4 mg total) by mouth daily with dinner 30 capsule 0   • amitriptyline (ELAVIL) 10 mg tablet Take 1 tablet (10 mg total) by mouth nightly 30 tablet 0   • atorvastatin (LIPITOR) 80 mg tablet Take 1 tablet (80 mg total) by mouth daily 90 tablet 3   • clopidogreL (PLAVIX) 75 mg tablet Take 1 tablet (75 mg total) by mouth daily 90 tablet 3   • dexlansoprazole (Dexilant) 60 mg capsule Take 1 capsule (60 mg total) by mouth daily 90 capsule 3   • empagliflozin  (Jardiance) 25 mg tablet Take 1 tablet (25 mg total) by mouth daily 90 tablet 3   • metoprolol tartrate (LOPRESSOR) 25 mg tablet Take 1 tablet (25 mg total) by mouth 2 (two) times a day 180 tablet 3   • lisinopriL (PRINIVIL,ZESTRIL) 10 mg tablet Take 1 tablet (10 mg total) by mouth daily 90 tablet 3   • isosorbide mononitrate (IMDUR) 30 mg 24 hr tablet Take 30 mg by mouth daily Indications: patient only takes as needed if having pain     • aspirin 81 mg EC tablet Take 81 mg by mouth daily.       No current facility-administered medications for this visit.     Past Medical History:   Diagnosis Date   • Diabetes mellitus (CMS/HCC) (HCC)    • Hypertension      Past Surgical History:   Procedure Laterality Date   • CARDIAC SURGERY  01/04/2016    Cardiac bypass X4 vessels   • CARDIAC SURGERY  01/01/1997    2 stents   • CATARACT EXTRACTION Left    • CHOLECYSTECTOMY  02/14/2007   • COLONOSCOPY      Approx 8 years ago with Dr Mcguire- was to have 5 year follow up   • COLONOSCOPY  01/01/2015    Polyps     Family History   Problem Relation Age of Onset   • Heart disease Mother    • Heart attack Father         Myocardial infarction   • Diabetes type II Brother         Toes removed due to diabetes     Social History     Socioeconomic History   • Marital status:    Tobacco Use   • Smoking status: Former Smoker     Packs/day: 0.00     Years: 20.00     Pack years: 0.00   • Smokeless tobacco: Never Used   Substance and Sexual Activity   • Alcohol use: Yes     Comment: Occasionally   • Drug use: No   • Sexual activity: Defer     Social Determinants of Health     Tobacco Use: Medium Risk   • Smoking Tobacco Use: Former Smoker   • Smokeless Tobacco Use: Never Used       Review of Systems   Constitutional: Negative for chills, fatigue, fever and unexpected weight change.   Respiratory: Negative for shortness of breath.    Cardiovascular: Negative for chest pain, palpitations and leg swelling.   Gastrointestinal: Negative for  diarrhea and nausea.   Genitourinary: Positive for difficulty urinating and frequency. Negative for dysuria.   Skin: Positive for rash (fore head ).   Neurological: Positive for numbness (burning feet, night time ). Negative for dizziness, weakness, light-headedness and headaches.   Psychiatric/Behavioral: Negative for sleep disturbance.     As noted in HPI    Objective   /60 (BP Location: Left arm, Patient Position: Sitting, Cuff Size: Regular Adult)   Pulse 76   Temp 36.8 °C (98.3 °F) (Temporal)   Resp 18   Wt 98.4 kg (217 lb)   SpO2 94%   BMI 29.43 kg/m²     Physical Exam  Constitutional:       General: He is not in acute distress.     Appearance: Normal appearance. He is not ill-appearing.   HENT:      Nose: Nose normal.      Mouth/Throat:      Mouth: Mucous membranes are moist.      Pharynx: Oropharynx is clear. No oropharyngeal exudate or posterior oropharyngeal erythema.   Cardiovascular:      Rate and Rhythm: Normal rate and regular rhythm.      Pulses: Normal pulses.      Heart sounds: Normal heart sounds.   Pulmonary:      Effort: Pulmonary effort is normal. No respiratory distress.      Breath sounds: Normal breath sounds. No wheezing, rhonchi or rales.   Musculoskeletal:         General: No swelling or tenderness.      Cervical back: No muscular tenderness.      Right lower leg: No edema.      Left lower leg: No edema.   Skin:     General: Skin is warm and dry.      Capillary Refill: Capillary refill takes less than 2 seconds.      Findings: No rash.   Neurological:      General: No focal deficit present.      Mental Status: He is alert and oriented to person, place, and time. Mental status is at baseline.   Psychiatric:         Mood and Affect: Mood normal.         Behavior: Behavior normal.         Judgment: Judgment normal.         ASSESSMENT AND PLAN   Diagnoses and all orders for this visit:    Type 2 diabetes mellitus without complication, without long-term current use of insulin  (CMS/[facility]) ([facility])  -     metFORMIN XR (GLUCOPHAGE-XR) 500 mg 24 hr tablet; Take 1 tablet (500 mg total) by mouth 2 (two) times a day  -     Hemoglobin A1c (glycosylated) Blood, Venous; Future  -     Basic metabolic panel Blood, Venous; Future    Benign prostatic hyperplasia with urinary frequency  -     tamsulosin (FLOMAX) 0.4 mg capsule; Take 1 capsule (0.4 mg total) by mouth daily with dinner    Neuropathy  -     amitriptyline (ELAVIL) 10 mg tablet; Take 1 tablet (10 mg total) by mouth nightly    A1C improved and he has been without his metformin x 1 month.   With symptoms of fatigue and headache on metformin 1000 BID, will restart with XR at 500 BID and continue Jardiance 25 mg daily . Recheck A1C in 3 months    For BPH symptoms will trial flomax at bedtime, if no improvement consider alpha blocker    Likely DM neuropathy, will trial amitriptyline at bedtime as reports prior SE to lyrica of night villatoro, and has been on gabapentin in the past.    Recommend follow up 3 months with labs prior.         Karli Brito MD      A voice recognition program may have been used to aid in documentation. Words are not always transcribed exactly as spoken. Errors may be present despite efforts to correct and should be taken within the context of the discussion. Please contact the provider if you need assistance interpreting this documentation

## 2022-08-01 DIAGNOSIS — N40.1 BENIGN PROSTATIC HYPERPLASIA WITH URINARY FREQUENCY: ICD-10-CM

## 2022-08-01 DIAGNOSIS — R35.0 BENIGN PROSTATIC HYPERPLASIA WITH URINARY FREQUENCY: ICD-10-CM

## 2022-08-01 RX ORDER — TAMSULOSIN HYDROCHLORIDE 0.4 MG/1
CAPSULE ORAL
Qty: 90 CAPSULE | Refills: 1 | Status: SHIPPED | OUTPATIENT
Start: 2022-08-01 | End: 2022-10-12 | Stop reason: SDUPTHER

## 2022-08-01 NOTE — TELEPHONE ENCOUNTER
Care Due:                  Date            Visit Type   Department     Provider  --------------------------------------------------------------------------------                              DIABETIC     [facility]  Last Visit: 06-      FOLLOW UP    MEDICINE       CAROL JENKINS  Next Visit: None Scheduled  None         None Found                                                            Last  Test          Frequency    Reason                     Performed    Due Date  --------------------------------------------------------------------------------  LDL.........  12 months..  atorvastatin.............  Not Found    Overdue    [employer] Embedded Care Gaps. Reference number: [id number]. 8/01/2022 8:16:58 AM RYAN

## 2022-09-12 DIAGNOSIS — K21.9 GASTROESOPHAGEAL REFLUX DISEASE, UNSPECIFIED WHETHER ESOPHAGITIS PRESENT: ICD-10-CM

## 2022-09-12 RX ORDER — DEXLANSOPRAZOLE 60 MG/1
CAPSULE, DELAYED RELEASE ORAL
Qty: 90 CAPSULE | Refills: 3 | Status: SHIPPED | OUTPATIENT
Start: 2022-09-12 | End: 2023-03-10 | Stop reason: ALTCHOICE

## 2022-09-12 NOTE — TELEPHONE ENCOUNTER
No new care gaps identified.  [facility] Embedded Care Gaps. Reference number: [id number]. 9/12/2022 3:18:46 PM RYAN

## 2022-09-16 DIAGNOSIS — E11.9 TYPE 2 DIABETES MELLITUS WITHOUT COMPLICATION, WITHOUT LONG-TERM CURRENT USE OF INSULIN (CMS/HCC): ICD-10-CM

## 2022-09-16 NOTE — TELEPHONE ENCOUNTER
No new care gaps identified.  [facility] Embedded Care Gaps. Reference number: [id number]. 9/16/2022 9:35:38 AM RYAN

## 2022-09-19 RX ORDER — EMPAGLIFLOZIN 25 MG/1
1 TABLET, FILM COATED ORAL DAILY
Qty: 30 TABLET | Refills: 0 | Status: SHIPPED | OUTPATIENT
Start: 2022-09-19 | End: 2022-10-20

## 2022-09-20 DIAGNOSIS — I10 ESSENTIAL HYPERTENSION: ICD-10-CM

## 2022-09-20 RX ORDER — LISINOPRIL 10 MG/1
10 TABLET ORAL DAILY
Qty: 90 TABLET | Refills: 0 | Status: SHIPPED | OUTPATIENT
Start: 2022-09-20 | End: 2022-12-15

## 2022-09-20 RX ORDER — LISINOPRIL 10 MG/1
TABLET ORAL
Qty: 90 TABLET | Refills: 3 | OUTPATIENT
Start: 2022-09-20

## 2022-09-20 NOTE — TELEPHONE ENCOUNTER
No new care gaps identified.  [facility] Embedded Care Gaps. Reference number: [id number]. 9/20/2022 8:05:35 AM RYAN

## 2022-09-21 ENCOUNTER — TELEPHONE (OUTPATIENT)
Dept: FAMILY MEDICINE | Facility: CLINIC | Age: 77
End: 2022-09-21
Payer: MEDICARE

## 2022-09-22 ENCOUNTER — APPOINTMENT (OUTPATIENT)
Dept: RADIOLOGY | Facility: CLINIC | Age: 77
End: 2022-09-22
Payer: MEDICARE

## 2022-09-22 ENCOUNTER — APPOINTMENT (OUTPATIENT)
Dept: LAB | Facility: CLINIC | Age: 77
End: 2022-09-22
Payer: MEDICARE

## 2022-09-22 ENCOUNTER — HOSPITAL ENCOUNTER (OUTPATIENT)
Dept: CT IMAGING | Facility: HOSPITAL | Age: 77
Discharge: HOME | End: 2022-09-22
Payer: MEDICARE

## 2022-09-22 ENCOUNTER — OFFICE VISIT (OUTPATIENT)
Dept: FAMILY MEDICINE | Facility: CLINIC | Age: 77
End: 2022-09-22
Payer: MEDICARE

## 2022-09-22 VITALS
WEIGHT: 215 LBS | SYSTOLIC BLOOD PRESSURE: 118 MMHG | OXYGEN SATURATION: 93 % | RESPIRATION RATE: 22 BRPM | DIASTOLIC BLOOD PRESSURE: 64 MMHG | BODY MASS INDEX: 29.16 KG/M2 | TEMPERATURE: 98.4 F | HEART RATE: 80 BPM

## 2022-09-22 DIAGNOSIS — R29.6 REPEATED FALLS: ICD-10-CM

## 2022-09-22 DIAGNOSIS — I25.118 CORONARY ARTERY DISEASE OF NATIVE ARTERY OF NATIVE HEART WITH STABLE ANGINA PECTORIS (CMS/HCC): ICD-10-CM

## 2022-09-22 DIAGNOSIS — R53.1 EPISODIC WEAKNESS: Primary | ICD-10-CM

## 2022-09-22 DIAGNOSIS — R53.1 WEAKNESS: ICD-10-CM

## 2022-09-22 DIAGNOSIS — E11.9 TYPE 2 DIABETES MELLITUS WITHOUT COMPLICATION, WITHOUT LONG-TERM CURRENT USE OF INSULIN (CMS/HCC): ICD-10-CM

## 2022-09-22 LAB
ALBUMIN SERPL-MCNC: 4 G/DL (ref 3.3–5.5)
ALP SERPL-CCNC: 76 U/L (ref 45–115)
ALT SERPL-CCNC: 32 U/L (ref 10–47)
ANION GAP SERPL CALC-SCNC: 11 MMOL/L (ref 3–11)
AST SERPL-CCNC: 34 U/L
BACTERIA #/AREA URNS HPF: NORMAL /HPF
BASOPHILS # BLD AUTO: 0.01 10*3/UL
BASOPHILS NFR BLD AUTO: 0.3 % (ref 0–2)
BILIRUB SERPL-MCNC: 1.8 MG/DL (ref 0.2–1.4)
BILIRUB UR QL: NEGATIVE
BUN SERPL-MCNC: 15 MG/DL (ref 7–25)
CALCIUM ALBUM COR SERPL-MCNC: 9.2 MG/DL (ref 8.6–10.3)
CALCIUM SERPL-MCNC: 9.2 MG/DL (ref 8.6–10.3)
CHLORIDE SERPL-SCNC: 100 MMOL/L (ref 98–107)
CLARITY UR: CLEAR
CO2 SERPL-SCNC: 26 MMOL/L (ref 21–32)
COLOR UR: YELLOW
CREAT SERPL-MCNC: 1.3 MG/DL (ref 0.7–1.3)
EOSINOPHIL # BLD AUTO: 0.14 10*3/UL
EOSINOPHIL NFR BLD AUTO: 2.4 % (ref 0–3)
ERYTHROCYTE [DISTWIDTH] IN BLOOD BY AUTOMATED COUNT: 13 % (ref 11.5–15)
EST. AVERAGE GLUCOSE BLD GHB EST-MCNC: 182.9 MG/DL
GFR SERPL CREATININE-BSD FRML MDRD: 57 ML/MIN/1.73M*2
GLUCOSE SERPL-MCNC: 264 MG/DL (ref 70–105)
GLUCOSE UR QL: >=1000 MG/DL
HBA1C MFR BLD: 8 % (ref 4.8–6)
HCT VFR BLD AUTO: 41.7 % (ref 38–50)
HGB BLD-MCNC: 14.6 G/DL (ref 13.2–17.2)
HGB UR QL: ABNORMAL
KETONES UR-MCNC: NEGATIVE MG/DL
LEUKOCYTE ESTERASE UR QL STRIP: NEGATIVE
LYMPHOCYTES # BLD AUTO: 0.68 10*3/UL
LYMPHOCYTES NFR BLD AUTO: 11.9 % (ref 15–47)
MCH RBC QN AUTO: 32.4 PG (ref 29–34)
MCHC RBC AUTO-ENTMCNC: 35 G/DL (ref 32–36)
MCV RBC AUTO: 92.7 FL (ref 82–97)
MONOCYTES # BLD AUTO: 0.55 10*3/UL
MONOCYTES NFR BLD AUTO: 9.7 % (ref 5–13)
NEUTROPHILS # BLD AUTO: 4.32 10*3/UL
NEUTROPHILS NFR BLD AUTO: 75.7 % (ref 46–70)
NITRITE UR QL: NEGATIVE
PH UR: 5.5 PH
PLATELET # BLD AUTO: 139 10*3/UL (ref 130–350)
PMV BLD AUTO: 7.1 FL (ref 6.9–10.8)
POTASSIUM SERPL-SCNC: 4.5 MMOL/L (ref 3.5–5.1)
PROT SERPL-MCNC: 7.7 G/DL (ref 6.4–8.1)
PROT UR STRIP-MCNC: NEGATIVE MG/DL
RBC # BLD AUTO: 4.5 10*6/ΜL (ref 4.1–5.8)
RBC #/AREA URNS HPF: NORMAL /HPF
SODIUM SERPL-SCNC: 137 MMOL/L (ref 128–145)
SP GR UR: <=1.005 (ref 1–1.03)
SQUAMOUS #/AREA URNS HPF: NORMAL /HPF
T4 FREE SERPL-MCNC: 0.89 NG/DL (ref 0.65–1.44)
TSH SERPL DL<=0.05 MIU/L-ACNC: 5.25 UIU/ML (ref 0.34–4.82)
UROBILINOGEN UR-MCNC: 1 MG/DL
WBC # BLD AUTO: 5.7 10*3/UL (ref 3.7–9.6)
WBC #/AREA URNS HPF: NORMAL /HPF

## 2022-09-22 PROCEDURE — 93010 ELECTROCARDIOGRAM REPORT: CPT | Performed by: INTERNAL MEDICINE

## 2022-09-22 PROCEDURE — 83036 HEMOGLOBIN GLYCOSYLATED A1C: CPT | Performed by: FAMILY MEDICINE

## 2022-09-22 PROCEDURE — G1004 CDSM NDSC: HCPCS | Performed by: INTERNAL MEDICINE

## 2022-09-22 PROCEDURE — 80053 COMPREHEN METABOLIC PANEL: CPT | Performed by: FAMILY MEDICINE

## 2022-09-22 PROCEDURE — 36415 COLL VENOUS BLD VENIPUNCTURE: CPT | Mod: NCP | Performed by: FAMILY MEDICINE

## 2022-09-22 PROCEDURE — 70450 CT HEAD/BRAIN W/O DYE: CPT | Mod: MG

## 2022-09-22 PROCEDURE — 99214 OFFICE O/P EST MOD 30 MIN: CPT | Performed by: FAMILY MEDICINE

## 2022-09-22 PROCEDURE — 85025 COMPLETE CBC W/AUTO DIFF WBC: CPT | Performed by: FAMILY MEDICINE

## 2022-09-22 PROCEDURE — 81001 URINALYSIS AUTO W/SCOPE: CPT | Performed by: FAMILY MEDICINE

## 2022-09-22 PROCEDURE — 84443 ASSAY THYROID STIM HORMONE: CPT | Performed by: FAMILY MEDICINE

## 2022-09-22 PROCEDURE — 70450 CT HEAD/BRAIN W/O DYE: CPT | Mod: 26,MG | Performed by: INTERNAL MEDICINE

## 2022-09-22 PROCEDURE — 93005 ELECTROCARDIOGRAM TRACING: CPT | Performed by: FAMILY MEDICINE

## 2022-09-22 PROCEDURE — 84439 ASSAY OF FREE THYROXINE: CPT | Performed by: FAMILY MEDICINE

## 2022-09-22 RX ORDER — NETARSUDIL 0.2 MG/ML
SOLUTION/ DROPS OPHTHALMIC; TOPICAL
COMMUNITY
Start: 2022-09-13 | End: 2024-04-08 | Stop reason: ALTCHOICE

## 2022-09-22 ASSESSMENT — ENCOUNTER SYMPTOMS
DYSURIA: 0
NUMBNESS: 0
DYSPHORIC MOOD: 0
NECK PAIN: 0
BRUISES/BLEEDS EASILY: 0
CHILLS: 0
MYALGIAS: 0
HEMATURIA: 0
PALPITATIONS: 0
SEIZURES: 0
DIZZINESS: 0
DIFFICULTY URINATING: 0
BLOOD IN STOOL: 0
SLEEP DISTURBANCE: 0
SHORTNESS OF BREATH: 0
HEADACHES: 0
ARTHRALGIAS: 1
NECK STIFFNESS: 0
LIGHT-HEADEDNESS: 0
BACK PAIN: 0
NAUSEA: 0
ABDOMINAL PAIN: 0
SPEECH DIFFICULTY: 0
SORE THROAT: 0
COUGH: 0
FACIAL ASYMMETRY: 0
UNEXPECTED WEIGHT CHANGE: 0
TREMORS: 0
VOMITING: 0
DIARRHEA: 0
WEAKNESS: 1
FATIGUE: 0
FEVER: 0

## 2022-09-22 NOTE — PROGRESS NOTES
Subjective      Abhilash Huggins is a 77 y.o. male who presents for episodes of weakness.  In the last week he had 2-3 episodes where he felt weakness and went down to the ground and then could not get up as he had limited use of his arms.  He feels like his leg strength maintained and he was clear minded was not dizzy but just could not get himself up.  Both times he had his  assist him to his chair after about 45 minutes his symptoms of weakness cleared and he was able to go about his day.    He does have a significant past medical history of high blood pressure, coronary artery disease status post CABG about 7 years ago in [place], congestive heart failure, type 2 diabetes, GERD and BPH.    He has been lost for cardiac follow-up for about 5 years now.  Denies any shortness of breath or chest pain.  No palpitations.    He does admit to a fall about 2 months ago where he fell forward and hit his face on a gravel road.  One of his teeth went through his upper lip, his glasses broke and went into his face and he has ongoing swelling and discomfort in his left hand and wrist.    Has not taken his blood sugar or blood pressure during these episodes.    He has a longstanding history of headaches typically worse in the morning this has not changed no associated vision changes.    Normal gait at this time.        HPI    The following have been reviewed and updated as appropriate in this visit:          Allergies   Allergen Reactions    Amoxicillin      HIVES, NAUSEA    Penicillins      HIVES, NAUSEA     Current Outpatient Medications   Medication Sig Dispense Refill    Rhopressa 0.02 % drops SMARTSI Drop(s) Left Eye Every Evening      lisinopriL (PRINIVIL,ZESTRIL) 10 mg tablet Take 1 tablet (10 mg total) by mouth daily 90 tablet 0    empagliflozin (Jardiance) 25 mg tablet Take 1 tablet (25 mg total) by mouth daily Repeat labs for further refills 30 tablet 0    Dexilant 60 mg capsule TAKE ONE CAPSULE BY MOUTH  EVERY DAY 90 capsule 3    tamsulosin (FLOMAX) 0.4 mg capsule TAKE ONE CAPSULE BY MOUTH EVERY DAY WITH DINNER 90 capsule 1    metFORMIN XR (GLUCOPHAGE-XR) 500 mg 24 hr tablet Take 1 tablet (500 mg total) by mouth 2 (two) times a day 180 tablet 0    atorvastatin (LIPITOR) 80 mg tablet Take 1 tablet (80 mg total) by mouth daily 90 tablet 3    clopidogreL (PLAVIX) 75 mg tablet Take 1 tablet (75 mg total) by mouth daily 90 tablet 3    metoprolol tartrate (LOPRESSOR) 25 mg tablet Take 1 tablet (25 mg total) by mouth 2 (two) times a day 180 tablet 3     No current facility-administered medications for this visit.     Past Medical History:   Diagnosis Date    Diabetes mellitus (CMS/HCC) (HCC)     Hypertension      Past Surgical History:   Procedure Laterality Date    CARDIAC SURGERY  01/04/2016    Cardiac bypass X4 vessels    CARDIAC SURGERY  01/01/1997    2 stents    CATARACT EXTRACTION Left     CHOLECYSTECTOMY  02/14/2007    COLONOSCOPY      Approx 8 years ago with Dr Mcguire- was to have 5 year follow up    COLONOSCOPY  01/01/2015    Polyps     Family History   Problem Relation Age of Onset    Heart disease Mother     Heart attack Father         Myocardial infarction    Diabetes type II Brother         Toes removed due to diabetes     Social History     Socioeconomic History    Marital status:    Tobacco Use    Smoking status: Former     Packs/day: 0.00     Years: 20.00     Pack years: 0.00     Types: Cigarettes    Smokeless tobacco: Never   Vaping Use    Vaping Use: Never used   Substance and Sexual Activity    Alcohol use: Yes     Comment: Occasionally    Drug use: No    Sexual activity: Defer     Social Determinants of Health     Tobacco Use: Medium Risk    Smoking Tobacco Use: Former    Smokeless Tobacco Use: Never       Review of Systems   Constitutional:  Negative for chills, fatigue, fever and unexpected weight change.   HENT:  Negative for congestion and sore throat.    Eyes:  Negative for visual  disturbance.   Respiratory:  Negative for cough and shortness of breath.    Cardiovascular:  Negative for chest pain, palpitations and leg swelling.   Gastrointestinal:  Negative for abdominal pain, blood in stool, diarrhea, nausea and vomiting.   Genitourinary:  Negative for difficulty urinating, dysuria and hematuria.   Musculoskeletal:  Positive for arthralgias (left shoulder). Negative for back pain, myalgias, neck pain and neck stiffness.   Skin:  Negative for rash.   Neurological:  Positive for weakness. Negative for dizziness, tremors, seizures, syncope, facial asymmetry, speech difficulty, light-headedness, numbness and headaches.   Hematological:  Does not bruise/bleed easily.   Psychiatric/Behavioral:  Negative for dysphoric mood and sleep disturbance.    As noted in HPI    Objective   /64 (BP Location: Left arm, Patient Position: Sitting, Cuff Size: Regular Adult)   Pulse 80   Temp 36.9 °C (98.4 °F)   Resp 22   Wt 97.5 kg (215 lb)   SpO2 93%   BMI 29.16 kg/m²     Physical Exam  Constitutional:       Appearance: Normal appearance.   HENT:      Nose: Nose normal.      Mouth/Throat:      Mouth: Mucous membranes are moist.      Pharynx: Oropharynx is clear.   Eyes:      Extraocular Movements: Extraocular movements intact.      Conjunctiva/sclera: Conjunctivae normal.      Pupils: Pupils are equal, round, and reactive to light.   Cardiovascular:      Rate and Rhythm: Normal rate and regular rhythm.      Pulses: Normal pulses.   Pulmonary:      Effort: Pulmonary effort is normal.      Breath sounds: No wheezing, rhonchi or rales.   Abdominal:      General: Bowel sounds are normal.      Palpations: Abdomen is soft.   Musculoskeletal:         General: Tenderness present.      Right lower leg: No edema.      Left lower leg: No edema.      Comments: Limited at right shoulder, reports this is not new for him    Skin:     General: Skin is warm.      Capillary Refill: Capillary refill takes less than 2  seconds.   Neurological:      General: No focal deficit present.      Mental Status: He is alert and oriented to person, place, and time. Mental status is at baseline.      Motor: Weakness (Left shoulder 4/5) present.      Coordination: Coordination normal.      Comments: Intact coordination testing.  No resting tremor.  Normal gait.   Psychiatric:         Mood and Affect: Mood normal.         Behavior: Behavior normal.         Thought Content: Thought content normal.         Judgment: Judgment normal.       ASSESSMENT AND PLAN   Diagnoses and all orders for this visit:    Episodic weakness  -     US Echo complete; Future    Repeated falls   -     CT head without IV contrast; Future    Coronary artery disease of native artery of native heart with stable angina pectoris (CMS/HCC) ([facility])  -     US Echo complete; Future      77-year-old male with history of coronary artery disease status post CABG, type 2 diabetes and hypertension with history of recent recurrent falls associated with significant weakness.    They last about 45 minutes symptoms then resolved    Work-up prior to arrival includes lab work significant for a glucose of 264.  A1c of 8.0 average blood sugar of 182.  Normal kidney and liver function testing.  Normal hemoglobin.    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  The block appears to be possibly new the most recent EKG I have on file was from 2016 showing the inferior changes.    I did advise further cardiac work-up including echo and Holter monitor.  Patient is agreeable for echo declines Holter he will follow-up with his cardiac specialist in [place].    Also with recent trauma to his head/face and symptoms of intermittent significant weakness and falls I recommend CT of head to rule out any acute bleed or abnormalities.  We did discuss the need for possible MRI but he would need to have this sedated.  He is trialed before taking  oral sedative and it did not go well he was unable to complete the imaging.    I also recommended if any episodes occur over the weekend to advised him to check his blood sugar during this time along with a blood pressure and heart rate.  Wife and patient is agreeable to this    No acute findings on today's exam    With A1c being 8.0 we will consider going back to full strength metformin once we ensure these episodes are not related to low sugars.    Follow-up 3 months sooner as needed  Karli Brito MD      A voice recognition program may have been used to aid in documentation. Words are not always transcribed exactly as spoken. Errors may be present despite efforts to correct and should be taken within the context of the discussion. Please contact the provider if you need assistance interpreting this documentation

## 2022-09-23 DIAGNOSIS — E11.9 TYPE 2 DIABETES MELLITUS WITHOUT COMPLICATION, WITHOUT LONG-TERM CURRENT USE OF INSULIN (CMS/HCC): ICD-10-CM

## 2022-09-23 RX ORDER — METFORMIN HYDROCHLORIDE 500 MG/1
TABLET, EXTENDED RELEASE ORAL
Qty: 180 TABLET | Refills: 0 | Status: SHIPPED | OUTPATIENT
Start: 2022-09-23 | End: 2022-12-28

## 2022-09-23 NOTE — TELEPHONE ENCOUNTER
No new care gaps identified.  [facility] Embedded Care Gaps. Reference number: [id number]. 9/23/2022 8:08:04 AM RYAN

## 2022-09-24 DIAGNOSIS — I25.118 CORONARY ARTERY DISEASE OF NATIVE ARTERY OF NATIVE HEART WITH STABLE ANGINA PECTORIS (CMS/HCC): ICD-10-CM

## 2022-09-24 NOTE — TELEPHONE ENCOUNTER
No new care gaps identified.  [facility] Embedded Care Gaps. Reference number: [id number]. 9/24/2022 8:06:32 AM RYAN

## 2022-09-26 RX ORDER — CLOPIDOGREL BISULFATE 75 MG/1
TABLET ORAL
Qty: 90 TABLET | Refills: 3 | Status: SHIPPED | OUTPATIENT
Start: 2022-09-26 | End: 2023-10-13

## 2022-10-01 ENCOUNTER — HEALTH MAINTENANCE LETTER (OUTPATIENT)
Age: 77
End: 2022-10-01

## 2022-10-04 ENCOUNTER — APPOINTMENT (OUTPATIENT)
Dept: LAB | Facility: CLINIC | Age: 77
End: 2022-10-04
Payer: MEDICARE

## 2022-10-04 ENCOUNTER — OFFICE VISIT (OUTPATIENT)
Dept: FAMILY MEDICINE | Facility: CLINIC | Age: 77
End: 2022-10-04
Payer: MEDICARE

## 2022-10-04 ENCOUNTER — ANCILLARY PROCEDURE (OUTPATIENT)
Dept: RADIOLOGY | Facility: CLINIC | Age: 77
End: 2022-10-04
Payer: MEDICARE

## 2022-10-04 VITALS
SYSTOLIC BLOOD PRESSURE: 114 MMHG | DIASTOLIC BLOOD PRESSURE: 68 MMHG | WEIGHT: 210 LBS | OXYGEN SATURATION: 93 % | HEART RATE: 99 BPM | RESPIRATION RATE: 20 BRPM | BODY MASS INDEX: 28.48 KG/M2 | TEMPERATURE: 98.4 F

## 2022-10-04 DIAGNOSIS — E11.9 TYPE 2 DIABETES MELLITUS WITHOUT COMPLICATION, WITHOUT LONG-TERM CURRENT USE OF INSULIN (CMS/HCC): ICD-10-CM

## 2022-10-04 DIAGNOSIS — R32 URINARY INCONTINENCE, UNSPECIFIED TYPE: ICD-10-CM

## 2022-10-04 DIAGNOSIS — R79.82 ELEVATED C-REACTIVE PROTEIN (CRP): ICD-10-CM

## 2022-10-04 DIAGNOSIS — R63.0 LOSS OF APPETITE: Primary | ICD-10-CM

## 2022-10-04 DIAGNOSIS — R63.4 WEIGHT LOSS, UNINTENTIONAL: ICD-10-CM

## 2022-10-04 DIAGNOSIS — R53.83 OTHER FATIGUE: ICD-10-CM

## 2022-10-04 DIAGNOSIS — R70.0 ELEVATED ERYTHROCYTE SEDIMENTATION RATE: ICD-10-CM

## 2022-10-04 DIAGNOSIS — Z95.1 S/P CABG X 4: ICD-10-CM

## 2022-10-04 DIAGNOSIS — N40.0 BENIGN PROSTATIC HYPERPLASIA WITHOUT URINARY OBSTRUCTION: ICD-10-CM

## 2022-10-04 LAB
ANION GAP SERPL CALC-SCNC: 12 MMOL/L (ref 3–11)
BUN SERPL-MCNC: 16 MG/DL (ref 7–25)
CALCIUM SERPL-MCNC: 9.3 MG/DL (ref 8.6–10.3)
CHLORIDE SERPL-SCNC: 97 MMOL/L (ref 98–107)
CO2 SERPL-SCNC: 24 MMOL/L (ref 21–32)
CREAT SERPL-MCNC: 0.6 MG/DL (ref 0.7–1.3)
CRP SERPL-MCNC: 109.3 MG/L
ERYTHROCYTE [SEDIMENTATION RATE] IN BLOOD: 82 MM/HR
EST. AVERAGE GLUCOSE BLD GHB EST-MCNC: 185.8 MG/DL
GFR SERPL CREATININE-BSD FRML MDRD: 99 ML/MIN/1.73M*2
GLUCOSE SERPL-MCNC: 172 MG/DL (ref 70–105)
HBA1C MFR BLD: 8.1 % (ref 4.8–6)
POTASSIUM SERPL-SCNC: 4.5 MMOL/L (ref 3.5–5.1)
RHEUMATOID FACT SERPL-ACNC: <10 IU/ML
SODIUM SERPL-SCNC: 133 MMOL/L (ref 128–145)
T4 FREE SERPL-MCNC: 1.03 NG/DL (ref 0.65–1.44)
TSH SERPL DL<=0.05 MIU/L-ACNC: 5.22 UIU/ML (ref 0.34–4.82)
VIT B12 SERPL-MCNC: 341 PG/ML (ref 180–914)

## 2022-10-04 PROCEDURE — 71046 X-RAY EXAM CHEST 2 VIEWS: CPT | Mod: 26,CMS | Performed by: INTERNAL MEDICINE

## 2022-10-04 PROCEDURE — 71046 X-RAY EXAM CHEST 2 VIEWS: CPT | Mod: TC,CMS | Performed by: FAMILY MEDICINE

## 2022-10-04 PROCEDURE — 84439 ASSAY OF FREE THYROXINE: CPT | Performed by: FAMILY MEDICINE

## 2022-10-04 PROCEDURE — 85652 RBC SED RATE AUTOMATED: CPT | Performed by: FAMILY MEDICINE

## 2022-10-04 PROCEDURE — 86140 C-REACTIVE PROTEIN: CPT | Performed by: FAMILY MEDICINE

## 2022-10-04 PROCEDURE — 83036 HEMOGLOBIN GLYCOSYLATED A1C: CPT | Performed by: FAMILY MEDICINE

## 2022-10-04 PROCEDURE — 36415 COLL VENOUS BLD VENIPUNCTURE: CPT | Performed by: FAMILY MEDICINE

## 2022-10-04 PROCEDURE — 86431 RHEUMATOID FACTOR QUANT: CPT | Performed by: FAMILY MEDICINE

## 2022-10-04 PROCEDURE — 99214 OFFICE O/P EST MOD 30 MIN: CPT | Performed by: FAMILY MEDICINE

## 2022-10-04 PROCEDURE — 80048 BASIC METABOLIC PNL TOTAL CA: CPT | Performed by: FAMILY MEDICINE

## 2022-10-04 PROCEDURE — 82607 VITAMIN B-12: CPT | Performed by: FAMILY MEDICINE

## 2022-10-04 PROCEDURE — 84443 ASSAY THYROID STIM HORMONE: CPT | Performed by: FAMILY MEDICINE

## 2022-10-04 ASSESSMENT — ENCOUNTER SYMPTOMS
LIGHT-HEADEDNESS: 0
PALPITATIONS: 0
HEMATURIA: 0
FATIGUE: 1
FEVER: 0
HEADACHES: 0
NAUSEA: 0
WEAKNESS: 0
DIARRHEA: 0
SORE THROAT: 0
ABDOMINAL PAIN: 0
NUMBNESS: 0
CONSTIPATION: 1
ACTIVITY CHANGE: 1
FLANK PAIN: 0
BRUISES/BLEEDS EASILY: 0
BLOOD IN STOOL: 0
APPETITE CHANGE: 1
DIFFICULTY URINATING: 0
BACK PAIN: 0
ARTHRALGIAS: 0
ABDOMINAL DISTENTION: 0
CHILLS: 0
DYSURIA: 0
SHORTNESS OF BREATH: 0
DIZZINESS: 0
FREQUENCY: 1
UNEXPECTED WEIGHT CHANGE: 1

## 2022-10-04 NOTE — PROGRESS NOTES
Subjective      Abhilash Huggins is a 77 y.o. male who presents for ongoing concerns of weight loss, loss of appetite and fatigue.  Informs me he does not feel hungry he is lost 5 pounds in 2 weeks since I last seen him.  Denies any shortness of breath, nausea, abdominal pain that is new.  No blood in the urine or stool.  Denies chest pain or palpitations.    Seen last on  evaluation completed mostly notable for his A1c of 8.0 this is not far off the patient's baseline of his blood pressure sugar.  No evidence of anemia, very mild elevation in TSH at 5.25.    Denies any other episodes of falling and unable to get up or move his lower extremities.    Significant cardiac disease we did discuss arranging follow-up with his cardiologist him and his wife would like to switch to someone local as they previously went to [place] for this.    Known history of BPH now with increased urinary frequency some incontinence he is on nightly Flomax.  We did recently check a UA which was unremarkable.        HPI    The following have been reviewed and updated as appropriate in this visit:          Allergies   Allergen Reactions    Amoxicillin      HIVES, NAUSEA    Penicillins      HIVES, NAUSEA     Current Outpatient Medications   Medication Sig Dispense Refill    clopidogreL (PLAVIX) 75 mg tablet TAKE ONE TABLET BY MOUTH EVERY DAY 90 tablet 3    metFORMIN XR (GLUCOPHAGE-XR) 500 mg 24 hr tablet TAKE ONE TABLET BY MOUTH TWICE DAILY 180 tablet 0    Rhopressa 0.02 % drops SMARTSI Drop(s) Left Eye Every Evening      lisinopriL (PRINIVIL,ZESTRIL) 10 mg tablet Take 1 tablet (10 mg total) by mouth daily 90 tablet 0    empagliflozin (Jardiance) 25 mg tablet Take 1 tablet (25 mg total) by mouth daily Repeat labs for further refills 30 tablet 0    Dexilant 60 mg capsule TAKE ONE CAPSULE BY MOUTH EVERY DAY 90 capsule 3    tamsulosin (FLOMAX) 0.4 mg capsule TAKE ONE CAPSULE BY MOUTH EVERY DAY WITH DINNER 90 capsule 1    atorvastatin  (LIPITOR) 80 mg tablet Take 1 tablet (80 mg total) by mouth daily 90 tablet 3    metoprolol tartrate (LOPRESSOR) 25 mg tablet Take 1 tablet (25 mg total) by mouth 2 (two) times a day 180 tablet 3     No current facility-administered medications for this visit.     Past Medical History:   Diagnosis Date    Diabetes mellitus (CMS/HCC) (HCC)     Hypertension      Past Surgical History:   Procedure Laterality Date    CARDIAC SURGERY  01/04/2016    Cardiac bypass X4 vessels    CARDIAC SURGERY  01/01/1997    2 stents    CATARACT EXTRACTION Left     CHOLECYSTECTOMY  02/14/2007    COLONOSCOPY      Approx 8 years ago with Dr Mcguire- was to have 5 year follow up    COLONOSCOPY  01/01/2015    Polyps     Family History   Problem Relation Age of Onset    Heart disease Mother     Heart attack Father         Myocardial infarction    Diabetes type II Brother         Toes removed due to diabetes     Social History     Socioeconomic History    Marital status:    Tobacco Use    Smoking status: Former     Packs/day: 0.00     Years: 20.00     Pack years: 0.00     Types: Cigarettes    Smokeless tobacco: Never   Vaping Use    Vaping Use: Never used   Substance and Sexual Activity    Alcohol use: Yes     Comment: Occasionally    Drug use: No    Sexual activity: Defer     Social Determinants of Health     Tobacco Use: Medium Risk    Smoking Tobacco Use: Former    Smokeless Tobacco Use: Never       Review of Systems   Constitutional:  Positive for activity change, appetite change, fatigue and unexpected weight change. Negative for chills and fever.   HENT:  Negative for sore throat.    Respiratory:  Negative for shortness of breath.    Cardiovascular:  Negative for chest pain, palpitations and leg swelling.   Gastrointestinal:  Positive for constipation. Negative for abdominal distention, abdominal pain, blood in stool, diarrhea and nausea.   Genitourinary:  Positive for frequency and urgency. Negative for difficulty urinating,  dysuria, flank pain and hematuria.   Musculoskeletal:  Negative for arthralgias and back pain.   Skin:  Negative for rash.   Neurological:  Negative for dizziness, weakness, light-headedness, numbness and headaches.   Hematological:  Does not bruise/bleed easily.   As noted in HPI    Objective   /68 (BP Location: Right arm, Patient Position: Sitting, Cuff Size: Regular Adult)   Pulse 99   Temp 36.9 °C (98.4 °F)   Resp 20   Wt 95.3 kg (210 lb)   SpO2 93%   BMI 28.48 kg/m²     Physical Exam  Constitutional:       General: He is not in acute distress.     Appearance: Normal appearance. He is not ill-appearing, toxic-appearing or diaphoretic.   HENT:      Mouth/Throat:      Mouth: Mucous membranes are moist.      Pharynx: Oropharynx is clear.   Cardiovascular:      Rate and Rhythm: Normal rate and regular rhythm.   Pulmonary:      Effort: Pulmonary effort is normal.   Abdominal:      General: Bowel sounds are normal.      Palpations: Abdomen is soft.      Tenderness: There is no guarding.   Skin:     General: Skin is warm and dry.      Capillary Refill: Capillary refill takes less than 2 seconds.   Neurological:      General: No focal deficit present.      Mental Status: He is alert. Mental status is at baseline.   Psychiatric:         Mood and Affect: Mood normal.         Behavior: Behavior normal.         Thought Content: Thought content normal.         Judgment: Judgment normal.       ASSESSMENT AND PLAN   Diagnoses and all orders for this visit:    Loss of appetite  -     X-ray chest 2 views  -     Thyroid Stimulating Hormone, Ultrasensitive Blood, Venous  -     Vitamin B12 Blood, Venous  -     Sedimentation rate, automated Blood, Venous  -     C-reactive protein (Inflammation) Blood, Venous  -     Occult Blood, guaiac (non-MH sites only) Stool    Other fatigue  -     X-ray chest 2 views  -     Thyroid Stimulating Hormone, Ultrasensitive Blood, Venous  -     Vitamin B12 Blood, Venous  -     Sedimentation  rate, automated Blood, Venous  -     C-reactive protein (Inflammation) Blood, Venous  -     Occult Blood, guaiac (non- sites only) Stool  -     Ambulatory referral to Cardiology; Future  -     PAMELA Screen reflex, Multiplex Immunoassay Blood, Venous  -     Rheumatoid factor Blood, Venous    Weight loss, unintentional  -     X-ray chest 2 views  -     Thyroid Stimulating Hormone, Ultrasensitive Blood, Venous  -     Vitamin B12 Blood, Venous  -     Sedimentation rate, automated Blood, Venous  -     C-reactive protein (Inflammation) Blood, Venous  -     Occult Blood, guaiac (non- sites only) Stool    Benign prostatic hypertrophy without urinary obstruction  -     Ambulatory referral to Urology; Future    S/P CABG x 4  -     Ambulatory referral to Cardiology; Future    Urinary incontinence, unspecified type  -     Ambulatory referral to Urology; Future    Elevated C-reactive protein (CRP)  -     PAMELA Screen reflex, Multiplex Immunoassay Blood, Venous  -     Rheumatoid factor Blood, Venous    Elevated erythrocyte sedimentation rate  -     PAMELA Screen reflex, Multiplex Immunoassay Blood, Venous  -     Rheumatoid factor Blood, Venous      77-year-old male with a significant past medical history of type 2 diabetes, hypertension, cardiac disease status post bypass surgery now with worsening fatigue, loss of appetite and weight loss for 3 to 4 weeks    Initial work-up 2 weeks ago inconclusive elevated A1c but this was fairly stable for the patient    Mildly elevated TSH but with a normal T4 and T3.    Recommended updated echocardiogram and cardiology consult echo is still pending for scheduling.  Patient would like to go closer to home for cardiology new referral is sent today    Today's labs are significant for elevated inflammatory markers ESR and CRP moderate to severe elevation.  We will add on an PAMELA and rheumatoid factor and will consider CT abdomen pelvis    [facility] consultation for increased urinary symptoms  currently on Flomax may trial 0.8 mg dose in the meantime      He will let us know if any new symptoms occur    Recommend follow-up for weight check in 1 month, sooner if needed        Karli Brito MD      A voice recognition program may have been used to aid in documentation. Words are not always transcribed exactly as spoken. Errors may be present despite efforts to correct and should be taken within the context of the discussion. Please contact the provider if you need assistance interpreting this documentation

## 2022-10-07 ENCOUNTER — HOSPITAL ENCOUNTER (OUTPATIENT)
Dept: CT IMAGING | Facility: HOSPITAL | Age: 77
Discharge: HOME | End: 2022-10-07
Payer: MEDICARE

## 2022-10-07 DIAGNOSIS — R63.4 WEIGHT LOSS, UNINTENTIONAL: ICD-10-CM

## 2022-10-07 DIAGNOSIS — R63.0 LOSS OF APPETITE: ICD-10-CM

## 2022-10-07 DIAGNOSIS — R79.82 ELEVATED C-REACTIVE PROTEIN (CRP): ICD-10-CM

## 2022-10-07 DIAGNOSIS — R53.83 OTHER FATIGUE: ICD-10-CM

## 2022-10-07 PROCEDURE — 71260 CT THORAX DX C+: CPT | Mod: MG

## 2022-10-07 PROCEDURE — 74177 CT ABD & PELVIS W/CONTRAST: CPT | Mod: 26,MG | Performed by: INTERNAL MEDICINE

## 2022-10-07 PROCEDURE — 71260 CT THORAX DX C+: CPT | Mod: 26,MG | Performed by: INTERNAL MEDICINE

## 2022-10-07 PROCEDURE — 2550000100 HC RX 255: Performed by: FAMILY MEDICINE

## 2022-10-07 PROCEDURE — G1004 CDSM NDSC: HCPCS | Performed by: INTERNAL MEDICINE

## 2022-10-07 RX ORDER — IOPAMIDOL 755 MG/ML
130 INJECTION, SOLUTION INTRAVASCULAR ONCE
Status: COMPLETED | OUTPATIENT
Start: 2022-10-07 | End: 2022-10-07

## 2022-10-07 RX ADMIN — IOPAMIDOL 130 ML: 755 INJECTION, SOLUTION INTRAVENOUS at 11:15

## 2022-10-12 ENCOUNTER — APPOINTMENT (OUTPATIENT)
Dept: LAB | Facility: CLINIC | Age: 77
End: 2022-10-12
Payer: MEDICARE

## 2022-10-12 ENCOUNTER — OFFICE VISIT (OUTPATIENT)
Dept: FAMILY MEDICINE | Facility: CLINIC | Age: 77
End: 2022-10-12
Payer: MEDICARE

## 2022-10-12 VITALS
TEMPERATURE: 98 F | DIASTOLIC BLOOD PRESSURE: 72 MMHG | RESPIRATION RATE: 18 BRPM | HEART RATE: 95 BPM | OXYGEN SATURATION: 93 % | SYSTOLIC BLOOD PRESSURE: 122 MMHG

## 2022-10-12 DIAGNOSIS — R16.1 SPLENOMEGALY: Primary | ICD-10-CM

## 2022-10-12 DIAGNOSIS — R53.83 OTHER FATIGUE: ICD-10-CM

## 2022-10-12 DIAGNOSIS — N40.0 PROSTATE ENLARGEMENT: ICD-10-CM

## 2022-10-12 DIAGNOSIS — R35.0 BENIGN PROSTATIC HYPERPLASIA WITH URINARY FREQUENCY: ICD-10-CM

## 2022-10-12 DIAGNOSIS — N40.1 BENIGN PROSTATIC HYPERPLASIA WITH URINARY FREQUENCY: ICD-10-CM

## 2022-10-12 DIAGNOSIS — R29.6 REPEATED FALLS: ICD-10-CM

## 2022-10-12 DIAGNOSIS — R91.8 LUNG INFILTRATE ON CT: ICD-10-CM

## 2022-10-12 LAB
BASOPHILS # BLD AUTO: 0.03 10*3/UL
BASOPHILS NFR BLD AUTO: 0.5 % (ref 0–2)
CK SERPL-CCNC: 81 U/L (ref 39–308)
EOSINOPHIL # BLD AUTO: 0.15 10*3/UL
EOSINOPHIL NFR BLD AUTO: 2.5 % (ref 0–3)
ERYTHROCYTE [DISTWIDTH] IN BLOOD BY AUTOMATED COUNT: 14.2 % (ref 11.5–15)
ERYTHROCYTE [SEDIMENTATION RATE] IN BLOOD: 51 MM/HR
HCT VFR BLD AUTO: 42 % (ref 38–50)
HGB BLD-MCNC: 14 G/DL (ref 13.2–17.2)
LYMPHOCYTES # BLD AUTO: 1.27 10*3/UL
LYMPHOCYTES NFR BLD AUTO: 21.9 % (ref 15–47)
MCH RBC QN AUTO: 30.6 PG (ref 29–34)
MCHC RBC AUTO-ENTMCNC: 33.4 G/DL (ref 32–36)
MCV RBC AUTO: 91.8 FL (ref 82–97)
MONOCYTES # BLD AUTO: 0.47 10*3/UL
MONOCYTES NFR BLD AUTO: 8.1 % (ref 5–13)
NEUTROPHILS # BLD AUTO: 3.91 10*3/UL
NEUTROPHILS NFR BLD AUTO: 67.1 % (ref 46–70)
PLATELET # BLD AUTO: 247 10*3/UL (ref 130–350)
PMV BLD AUTO: 7 FL (ref 6.9–10.8)
RBC # BLD AUTO: 4.58 10*6/ΜL (ref 4.1–5.8)
WBC # BLD AUTO: 5.8 10*3/UL (ref 3.7–9.6)

## 2022-10-12 PROCEDURE — 85652 RBC SED RATE AUTOMATED: CPT | Performed by: FAMILY MEDICINE

## 2022-10-12 PROCEDURE — 85025 COMPLETE CBC W/AUTO DIFF WBC: CPT | Performed by: FAMILY MEDICINE

## 2022-10-12 PROCEDURE — 36415 COLL VENOUS BLD VENIPUNCTURE: CPT | Performed by: FAMILY MEDICINE

## 2022-10-12 PROCEDURE — 82550 ASSAY OF CK (CPK): CPT | Performed by: FAMILY MEDICINE

## 2022-10-12 PROCEDURE — 86235 NUCLEAR ANTIGEN ANTIBODY: CPT | Performed by: FAMILY MEDICINE

## 2022-10-12 PROCEDURE — 99213 OFFICE O/P EST LOW 20 MIN: CPT | Performed by: FAMILY MEDICINE

## 2022-10-12 PROCEDURE — 86038 ANTINUCLEAR ANTIBODIES: CPT | Performed by: FAMILY MEDICINE

## 2022-10-12 RX ORDER — CEFDINIR 300 MG/1
300 CAPSULE ORAL 2 TIMES DAILY
Qty: 14 CAPSULE | Refills: 0 | Status: SHIPPED | OUTPATIENT
Start: 2022-10-12 | End: 2022-10-19

## 2022-10-12 RX ORDER — TAMSULOSIN HYDROCHLORIDE 0.4 MG/1
0.8 CAPSULE ORAL
Qty: 180 CAPSULE | Refills: 1 | Status: SHIPPED | OUTPATIENT
Start: 2022-10-12 | End: 2023-03-10 | Stop reason: SDUPTHER

## 2022-10-12 RX ORDER — AZITHROMYCIN 250 MG/1
TABLET, FILM COATED ORAL
Qty: 6 TABLET | Refills: 0 | Status: SHIPPED | OUTPATIENT
Start: 2022-10-12 | End: 2023-03-10 | Stop reason: WASHOUT

## 2022-10-12 RX ORDER — PREDNISONE 20 MG/1
20 TABLET ORAL DAILY
Qty: 5 TABLET | Refills: 0 | Status: SHIPPED | OUTPATIENT
Start: 2022-10-12 | End: 2022-10-17

## 2022-10-12 ASSESSMENT — ENCOUNTER SYMPTOMS
FATIGUE: 1
NAUSEA: 0
SHORTNESS OF BREATH: 0
NUMBNESS: 0
DYSURIA: 0
ACTIVITY CHANGE: 0
WEAKNESS: 1
CHILLS: 0
FEVER: 0
PALPITATIONS: 0
NERVOUS/ANXIOUS: 0
BRUISES/BLEEDS EASILY: 0
MYALGIAS: 1
VOMITING: 0
ABDOMINAL DISTENTION: 0
DIAPHORESIS: 0
COUGH: 0
ARTHRALGIAS: 1

## 2022-10-12 NOTE — PROGRESS NOTES
Subjective      Abhilash Huggins is a 77 y.o. male who presents for follow-up.  Has had ongoing evaluation for initially recurrent falls intermittent significant weakness, aches, fatigue weight loss and loss of appetite.    Initial lab work was significant for significantly elevated inflammatory markers included CRP and ESR.    CTA chest abdomen pelvis was completed notable for some bilateral inflammatory versus infectious infiltrates in the lungs, splenomegaly and prostate enlargement.    He does report ongoing weakness today is a better day than usual.  Intermittent cough.    Prior to seeing the scan we had referred him to urology for a lower urinary tract symptoms he had increased his Flomax with good results.    Denies any other new symptoms today  .    HPI    The following have been reviewed and updated as appropriate in this visit:          Allergies   Allergen Reactions    Amoxicillin      HIVES, NAUSEA    Penicillins      HIVES, NAUSEA     Current Outpatient Medications   Medication Sig Dispense Refill    azithromycin (ZITHROMAX) 250 mg tablet Take 500 mg (2 tablets) PO the first day, then 250 mg (1 tablet) PO daily for 4 days. 6 tablet 0    cefdinir (OMNICEF) 300 mg capsule Take 1 capsule (300 mg total) by mouth 2 (two) times a day for 7 days 14 capsule 0    predniSONE (DELTASONE) 20 mg tablet Take 1 tablet (20 mg total) by mouth daily for 5 days 5 tablet 0    tamsulosin (FLOMAX) 0.4 mg capsule Take 2 capsules (0.8 mg total) by mouth daily with dinner 180 capsule 1    clopidogreL (PLAVIX) 75 mg tablet TAKE ONE TABLET BY MOUTH EVERY DAY 90 tablet 3    metFORMIN XR (GLUCOPHAGE-XR) 500 mg 24 hr tablet TAKE ONE TABLET BY MOUTH TWICE DAILY 180 tablet 0    Rhopressa 0.02 % drops SMARTSI Drop(s) Left Eye Every Evening      lisinopriL (PRINIVIL,ZESTRIL) 10 mg tablet Take 1 tablet (10 mg total) by mouth daily 90 tablet 0    empagliflozin (Jardiance) 25 mg tablet Take 1 tablet (25 mg total) by mouth daily Repeat labs  for further refills 30 tablet 0    Dexilant 60 mg capsule TAKE ONE CAPSULE BY MOUTH EVERY DAY 90 capsule 3    atorvastatin (LIPITOR) 80 mg tablet Take 1 tablet (80 mg total) by mouth daily 90 tablet 3    metoprolol tartrate (LOPRESSOR) 25 mg tablet Take 1 tablet (25 mg total) by mouth 2 (two) times a day 180 tablet 3     No current facility-administered medications for this visit.     Past Medical History:   Diagnosis Date    Diabetes mellitus (CMS/HCC) (HCC)     Hypertension      Past Surgical History:   Procedure Laterality Date    CARDIAC SURGERY  01/04/2016    Cardiac bypass X4 vessels    CARDIAC SURGERY  01/01/1997    2 stents    CATARACT EXTRACTION Left     CHOLECYSTECTOMY  02/14/2007    COLONOSCOPY      Approx 8 years ago with Dr Mcguire- was to have 5 year follow up    COLONOSCOPY  01/01/2015    Polyps     Family History   Problem Relation Age of Onset    Heart disease Mother     Heart attack Father         Myocardial infarction    Diabetes type II Brother         Toes removed due to diabetes     Social History     Socioeconomic History    Marital status:    Tobacco Use    Smoking status: Former     Packs/day: 0.00     Years: 20.00     Pack years: 0.00     Types: Cigarettes    Smokeless tobacco: Never   Vaping Use    Vaping Use: Never used   Substance and Sexual Activity    Alcohol use: Yes     Comment: Occasionally    Drug use: No    Sexual activity: Defer     Social Determinants of Health     Tobacco Use: Medium Risk    Smoking Tobacco Use: Former    Smokeless Tobacco Use: Never       Review of Systems   Constitutional:  Positive for fatigue. Negative for activity change, chills, diaphoresis and fever.   Eyes:  Negative for visual disturbance.   Respiratory:  Negative for cough and shortness of breath.    Cardiovascular:  Negative for chest pain, palpitations and leg swelling.   Gastrointestinal:  Negative for abdominal distention, nausea and vomiting.   Genitourinary:  Negative for dysuria.    Musculoskeletal:  Positive for arthralgias and myalgias.   Skin:  Negative for rash.   Neurological:  Positive for weakness. Negative for numbness.   Hematological:  Does not bruise/bleed easily.   Psychiatric/Behavioral:  The patient is not nervous/anxious.    As noted in HPI    Objective   /72 (BP Location: Right arm, Patient Position: Sitting, Cuff Size: Large Adult)   Pulse 95   Temp 36.7 °C (98 °F)   Resp 18   SpO2 93%     Physical Exam  Constitutional:       General: He is not in acute distress.     Appearance: Normal appearance. He is not ill-appearing.   HENT:      Mouth/Throat:      Mouth: Mucous membranes are moist.      Pharynx: Oropharynx is clear. No oropharyngeal exudate or posterior oropharyngeal erythema.   Cardiovascular:      Rate and Rhythm: Normal rate and regular rhythm.      Pulses: Normal pulses.      Heart sounds: Normal heart sounds.   Pulmonary:      Effort: Pulmonary effort is normal. No respiratory distress.      Breath sounds: Normal breath sounds. No wheezing, rhonchi or rales.   Musculoskeletal:      Cervical back: No muscular tenderness.   Skin:     General: Skin is warm and dry.      Capillary Refill: Capillary refill takes less than 2 seconds.      Findings: No rash.   Neurological:      General: No focal deficit present.      Mental Status: He is alert and oriented to person, place, and time. Mental status is at baseline.   Psychiatric:         Mood and Affect: Mood normal.         Behavior: Behavior normal.         Thought Content: Thought content normal.         Judgment: Judgment normal.       ASSESSMENT AND PLAN   Diagnoses and all orders for this visit:    Splenomegaly  -     Peripheral blood smear, Pathology    Prostate enlargement    Other fatigue  -     CK Blood, Venous  -     Sedimentation rate, automated Blood, Venous  -     predniSONE (DELTASONE) 20 mg tablet; Take 1 tablet (20 mg total) by mouth daily for 5 days    Lung infiltrate on CT  -     azithromycin  (ZITHROMAX) 250 mg tablet; Take 500 mg (2 tablets) PO the first day, then 250 mg (1 tablet) PO daily for 4 days.  -     cefdinir (OMNICEF) 300 mg capsule; Take 1 capsule (300 mg total) by mouth 2 (two) times a day for 7 days    Repeated falls    Benign prostatic hyperplasia with urinary frequency  -     tamsulosin (FLOMAX) 0.4 mg capsule; Take 2 capsules (0.8 mg total) by mouth daily with dinner    Reviewed CT imaging    Enlarged prostate most likely BPH recently responded well to increase in Flomax we will continue current dose of 0.8 mg until further evaluated by urology    Splenomegaly we will start with peripheral smear other lab work besides elevated inflammatory markers have been unremarkable.    With weakness, aches loss of appetite and weight loss discussed possible diagnosis of PMR we will trial the patient on prednisone 20 mg daily just for 5 days to see if he improves    With reported cough findings in lungs on imaging will empirically treat with azithromycin and cefdinir.  Reviewed risk benefits of antibiotic therapy    At this time we will continue to monitor him clinically we will see him back in 1 month plan to repeat inflammatory markers follow-up on clinical symptoms    We will do follow-up imaging on lungs in 3 months from prior CT scan          Karli Brito MD      A voice recognition program may have been used to aid in documentation. Words are not always transcribed exactly as spoken. Errors may be present despite efforts to correct and should be taken within the context of the discussion. Please contact the provider if you need assistance interpreting this documentation

## 2022-10-13 LAB
ANTI-NUCLEAR ANTIBODY (ANA), MIA: POSITIVE
CENTROMERE B AB SER-ACNC: <0.2 AI
CHROMATIN AB, MIA, INDEX: <0.2 AI
DSDNA IGG SERPL IA-ACNC: 4 IU/ML
ENA JO1 IGG SER-ACNC: <0.2 AI
ENA RNP IGG SER-ACNC: 5.5 AI
ENA SCL70 IGG SER IA-ACNC: <0.2 AI
ENA SM IGG SER-ACNC: <0.2 AI
ENA SM+RNP AB SER-ACNC: <0.2 AI
ENA SS-A AB SER-ACNC: <0.2 AI
ENA SS-B AB SER-ACNC: <0.2 AI
RIBOSOMAL P IGG SER-ACNC: <0.2 AI

## 2022-10-20 DIAGNOSIS — E11.9 TYPE 2 DIABETES MELLITUS WITHOUT COMPLICATION, WITHOUT LONG-TERM CURRENT USE OF INSULIN (CMS/HCC): ICD-10-CM

## 2022-10-20 RX ORDER — EMPAGLIFLOZIN 25 MG/1
25 TABLET, FILM COATED ORAL DAILY
Qty: 90 TABLET | Refills: 0 | Status: SHIPPED | OUTPATIENT
Start: 2022-10-20 | End: 2022-12-28

## 2022-10-20 NOTE — TELEPHONE ENCOUNTER
Care Due:                  Date            Visit Type   Department     Provider  --------------------------------------------------------------------------------                                           [facility]  Last Visit: 10-      SAME DAY     MEDICINE       CAROL JENKINS  Next Visit: None Scheduled  None         None Found                                                            Last  Test          Frequency    Reason                     Performed    Due Date  --------------------------------------------------------------------------------  LDL.........  12 months..  atorvastatin.............  Not Found    Overdue    [employer] Embedded Care Gaps. Reference number: [id number]. 10/20/2022 9:46:29 AM RYAN

## 2022-10-25 DIAGNOSIS — E11.9 TYPE 2 DIABETES MELLITUS WITHOUT COMPLICATION, WITHOUT LONG-TERM CURRENT USE OF INSULIN (CMS/HCC): ICD-10-CM

## 2022-10-25 DIAGNOSIS — E78.5 HYPERLIPIDEMIA, UNSPECIFIED HYPERLIPIDEMIA TYPE: ICD-10-CM

## 2022-10-25 RX ORDER — ATORVASTATIN CALCIUM 80 MG/1
TABLET, FILM COATED ORAL
Qty: 90 TABLET | Refills: 3 | Status: SHIPPED | OUTPATIENT
Start: 2022-10-25 | End: 2024-10-02

## 2022-10-25 NOTE — TELEPHONE ENCOUNTER
No new care gaps identified.  [facility] Embedded Care Gaps. Reference number: [id number]. 10/25/2022 8:03:04 AM RYAN

## 2022-11-02 DIAGNOSIS — I10 ESSENTIAL HYPERTENSION: ICD-10-CM

## 2022-11-02 RX ORDER — METOPROLOL TARTRATE 25 MG/1
TABLET, FILM COATED ORAL
Qty: 180 TABLET | Refills: 3 | OUTPATIENT
Start: 2022-11-02

## 2022-11-02 RX ORDER — METOPROLOL TARTRATE 25 MG/1
25 TABLET, FILM COATED ORAL 2 TIMES DAILY
Qty: 180 TABLET | Refills: 0 | Status: SHIPPED | OUTPATIENT
Start: 2022-11-02 | End: 2023-02-09

## 2022-11-02 NOTE — TELEPHONE ENCOUNTER
No new care gaps identified.  [facility] Embedded Care Gaps. Reference number: [id number]. 11/02/2022 8:00:32 AM RYAN

## 2022-12-14 DIAGNOSIS — I10 ESSENTIAL HYPERTENSION: ICD-10-CM

## 2022-12-14 NOTE — TELEPHONE ENCOUNTER
No new care gaps identified.  [facility] Embedded Care Gaps. Reference number: [id number]. 12/14/2022 8:03:30 AM MST

## 2022-12-15 RX ORDER — LISINOPRIL 10 MG/1
TABLET ORAL
Qty: 90 TABLET | Refills: 0 | Status: SHIPPED | OUTPATIENT
Start: 2022-12-15 | End: 2023-03-10 | Stop reason: SDUPTHER

## 2022-12-28 DIAGNOSIS — E11.9 TYPE 2 DIABETES MELLITUS WITHOUT COMPLICATION, WITHOUT LONG-TERM CURRENT USE OF INSULIN (CMS/HCC): ICD-10-CM

## 2022-12-28 RX ORDER — EMPAGLIFLOZIN 25 MG/1
TABLET, FILM COATED ORAL
Qty: 90 TABLET | Refills: 0 | Status: SHIPPED | OUTPATIENT
Start: 2022-12-28 | End: 2023-03-10 | Stop reason: SDUPTHER

## 2022-12-28 RX ORDER — METFORMIN HYDROCHLORIDE 500 MG/1
TABLET, EXTENDED RELEASE ORAL
Qty: 180 TABLET | Refills: 0 | Status: SHIPPED | OUTPATIENT
Start: 2022-12-28 | End: 2023-03-10 | Stop reason: SDUPTHER

## 2022-12-28 NOTE — TELEPHONE ENCOUNTER
Care Due:                  Date            Visit Type   Department     Provider  --------------------------------------------------------------------------------                                           [facility]  Last Visit: 10-      SAME DAY     MEDICINE       CAROL JENKINS  Next Visit: None Scheduled  None         None Found                                                            Last  Test          Frequency    Reason                     Performed    Due Date  --------------------------------------------------------------------------------  LDL.........  12 months..  atorvastatin.............  Not Found    Overdue    [employer] Embedded Care Gaps. Reference number: [id number]. 12/28/2022 11:38:19 AM MST

## 2023-01-20 ENCOUNTER — ANCILLARY PROCEDURE (OUTPATIENT)
Dept: CARDIOLOGY | Facility: CLINIC | Age: 78
End: 2023-01-20
Payer: MEDICARE

## 2023-01-20 VITALS
SYSTOLIC BLOOD PRESSURE: 120 MMHG | DIASTOLIC BLOOD PRESSURE: 70 MMHG | BODY MASS INDEX: 28.44 KG/M2 | HEIGHT: 72 IN | WEIGHT: 210 LBS

## 2023-01-20 PROCEDURE — 93306 TTE W/DOPPLER COMPLETE: CPT

## 2023-01-20 PROCEDURE — C8929 TTE W OR WO FOL WCON,DOPPLER: HCPCS

## 2023-01-20 PROCEDURE — 93306 TTE W/DOPPLER COMPLETE: CPT | Mod: 26 | Performed by: STUDENT IN AN ORGANIZED HEALTH CARE EDUCATION/TRAINING PROGRAM

## 2023-01-20 PROCEDURE — 2500000200 HC RX 250 WO HCPCS: Performed by: FAMILY MEDICINE

## 2023-01-20 PROCEDURE — 2550000100 HC RX 255: Performed by: FAMILY MEDICINE

## 2023-01-20 RX ORDER — SODIUM CHLORIDE 9 MG/ML
10 INJECTION INTRAMUSCULAR; INTRAVENOUS; SUBCUTANEOUS ONCE
Status: COMPLETED | OUTPATIENT
Start: 2023-01-20 | End: 2023-01-20

## 2023-01-20 RX ADMIN — SODIUM CHLORIDE 10 ML: 9 INJECTION, SOLUTION INTRAMUSCULAR; INTRAVENOUS; SUBCUTANEOUS at 08:35

## 2023-01-20 RX ADMIN — PERFLUTREN 2 ML: 6.52 INJECTION, SUSPENSION INTRAVENOUS at 08:40

## 2023-02-05 ENCOUNTER — HEALTH MAINTENANCE LETTER (OUTPATIENT)
Age: 78
End: 2023-02-05

## 2023-02-09 DIAGNOSIS — I10 ESSENTIAL HYPERTENSION: ICD-10-CM

## 2023-02-09 RX ORDER — METOPROLOL TARTRATE 25 MG/1
TABLET, FILM COATED ORAL
Qty: 180 TABLET | Refills: 1 | Status: SHIPPED | OUTPATIENT
Start: 2023-02-09 | End: 2023-03-10 | Stop reason: SDUPTHER

## 2023-02-23 ENCOUNTER — TELEPHONE (OUTPATIENT)
Dept: UROLOGY | Facility: CLINIC | Age: 78
End: 2023-02-23
Payer: MEDICARE

## 2023-02-23 NOTE — TELEPHONE ENCOUNTER
Called pt in regards of insurance had to leave a detailed message if pt calls back please send to  to update.

## 2023-02-24 NOTE — TELEPHONE ENCOUNTER
Called [facility] and they verified this pts eligibility over the phone and it is good for this appt. No need to transfer to  any longer.

## 2023-02-27 ENCOUNTER — LAB (OUTPATIENT)
Dept: LAB | Facility: CLINIC | Age: 78
End: 2023-02-27
Payer: MEDICARE

## 2023-02-27 ENCOUNTER — CONSULT (OUTPATIENT)
Dept: UROLOGY | Facility: CLINIC | Age: 78
End: 2023-02-27
Payer: MEDICARE

## 2023-02-27 VITALS
TEMPERATURE: 97.8 F | BODY MASS INDEX: 28.44 KG/M2 | OXYGEN SATURATION: 94 % | HEIGHT: 72 IN | SYSTOLIC BLOOD PRESSURE: 139 MMHG | WEIGHT: 210 LBS | DIASTOLIC BLOOD PRESSURE: 78 MMHG | HEART RATE: 85 BPM

## 2023-02-27 DIAGNOSIS — N40.1 BENIGN PROSTATIC HYPERPLASIA WITH LOWER URINARY TRACT SYMPTOMS, SYMPTOM DETAILS UNSPECIFIED: ICD-10-CM

## 2023-02-27 DIAGNOSIS — N40.0 BENIGN PROSTATIC HYPERPLASIA WITHOUT URINARY OBSTRUCTION: ICD-10-CM

## 2023-02-27 DIAGNOSIS — R32 URINARY INCONTINENCE, UNSPECIFIED TYPE: ICD-10-CM

## 2023-02-27 LAB
BACTERIA #/AREA URNS HPF: NORMAL /HPF
BILIRUB UR QL: NEGATIVE
CLARITY UR: CLEAR
COLOR UR: YELLOW
GLUCOSE UR QL: >=1000 MG/DL
HGB UR QL: NEGATIVE
KETONES UR-MCNC: NEGATIVE MG/DL
LEUKOCYTE ESTERASE UR QL STRIP: NEGATIVE
NITRITE UR QL: NEGATIVE
PH UR: 5.5 PH
PROT UR STRIP-MCNC: NEGATIVE MG/DL
RBC #/AREA URNS HPF: NORMAL /HPF
SP GR UR: 1.01 (ref 1–1.03)
SPECIMEN VOL ?TM UR: 207 ML
SPECIMEN VOL ?TM UR: 374 ML
SQUAMOUS #/AREA URNS HPF: NORMAL /HPF
UROBILINOGEN UR-MCNC: 0.2 MG/DL
WBC #/AREA URNS HPF: NORMAL /HPF

## 2023-02-27 PROCEDURE — 81001 URINALYSIS AUTO W/SCOPE: CPT

## 2023-02-27 PROCEDURE — G0463 HOSPITAL OUTPT CLINIC VISIT: HCPCS | Mod: 25

## 2023-02-27 PROCEDURE — G0463 HOSPITAL OUTPT CLINIC VISIT: HCPCS

## 2023-02-27 PROCEDURE — 99214 OFFICE O/P EST MOD 30 MIN: CPT

## 2023-02-27 PROCEDURE — 51798 US URINE CAPACITY MEASURE: CPT

## 2023-02-27 RX ORDER — FINASTERIDE 5 MG/1
5 TABLET, FILM COATED ORAL DAILY
Qty: 90 TABLET | Refills: 3 | Status: SHIPPED | OUTPATIENT
Start: 2023-02-27 | End: 2024-04-08 | Stop reason: ALTCHOICE

## 2023-02-27 ASSESSMENT — PAIN SCALES - GENERAL: PAINLEVEL: 0-NO PAIN

## 2023-02-27 NOTE — PATIENT INSTRUCTIONS
It was a pleasure seeing you in Urology today! Please to not hesitate to call if you have any questions or concerns regarding your plan of care.    Thank you,    Arianna Duke CNP   -You will continue taking Flomax as you have been prescribed today    -Potential side effects of the medication, most notably lightheadedness and dizziness secondary to orthostatic hypotension, and  take caution to ensure adequate hydration and moving from a laying to standing position slowly.     -Additional side effects may include stuffy nose, headache, and retrograde ejaculation.    -Stop taking the medication and contact the Urology Clinic if he were to experience these symptoms.     -We also discussed starting Finasteride today. This will shrink the prostate progressively and takes approximately 6 months to see full efficacy.    -Common side effects on this medication commonly include decreased libido, fatigue, and GI upset.    -We will continue you on Flomax today and initiate finasteride today

## 2023-02-27 NOTE — PROGRESS NOTES
"[facility]  Urology    CC:  \"I am here for my urination\"    HPI:  Abhilash Huggins is a 78 y.o. male presenting with lower urinary tract symptoms. The patient reports bothersome urinary symptoms that have been progressive getting worse.    Duration and course: 2-3 years progressively worsening  IPSS: 23  Most bothersome symptoms: Frequency, Urgency, Nocturia, and feeling of incomplete bladder emptying  History of gross hematuria: Denies  History of recurrent UTI's: Denies  Aggravating/alleviating factors: Aggravated by consuming too many fluids before bed no stated alleviating factors  Prior treatments and effect: Flomax 0.8 mg patient states this seems to be improving symptoms    Night time fluid intake: 3 glasses of water before bed  Snoring or morning somnolence: Denies  Caffeine and EtOH intake: 1-2 cups coffee in the morning rare ETOH consumption    Patient's QOL score today is 4 (mostly dissatisfied) with an initial bladder scan PVR of 374 cc. He stated at this time he had voided enough to give a urine sample and was able to void a second time with a PVR of 207 cc. His most recent PSA was in 2019 with a result of 1.29 ng/ml.    ROS:  General ROS: negative for - chills, fatigue, fever, unexplained weight loss or lack of appetite  Respiratory ROS: no cough, shortness of breath, or wheezing  Cardiovascular ROS: no chest pain or dyspnea on exertion, no palpitations  Gastrointestinal ROS: no abdominal pain, change in bowel habits, or black or bloody stools  Genito-Urinary ROS: see above   Musculoskeletal ROS: negative for - gait disturbance or injury, muscle or joint aches  All other systems reviewed and are negative.    Allergies:  Allergies   Allergen Reactions    Amoxicillin      HIVES, NAUSEA    Penicillins      HIVES, NAUSEA       Medications:  Current Outpatient Medications   Medication Sig Dispense Refill    metoprolol tartrate (LOPRESSOR) 25 mg tablet TAKE ONE TABLET BY MOUTH TWICE DAILY 180 tablet 1    " metFORMIN XR (GLUCOPHAGE-XR) 500 mg 24 hr tablet TAKE ONE TABLET BY MOUTH TWICE DAILY 180 tablet 0    Jardiance 25 mg tablet TAKE ONE TABLET BY MOUTH EVERY DAY 90 tablet 0    lisinopriL (PRINIVIL,ZESTRIL) 10 mg tablet TAKE ONE TABLET BY MOUTH EVERY DAY 90 tablet 0    atorvastatin (LIPITOR) 80 mg tablet TAKE ONE TABLET BY MOUTH EVERY DAY 90 tablet 3    tamsulosin (FLOMAX) 0.4 mg capsule Take 2 capsules (0.8 mg total) by mouth daily with dinner 180 capsule 1    clopidogreL (PLAVIX) 75 mg tablet TAKE ONE TABLET BY MOUTH EVERY DAY 90 tablet 3    Rhopressa 0.02 % drops SMARTSI Drop(s) Left Eye Every Evening      finasteride (PROSCAR) 5 mg tablet Take 1 tablet (5 mg total) by mouth daily 90 tablet 3    azithromycin (ZITHROMAX) 250 mg tablet Take 500 mg (2 tablets) PO the first day, then 250 mg (1 tablet) PO daily for 4 days. (Patient not taking: Reported on 2023) 6 tablet 0    Dexilant 60 mg capsule TAKE ONE CAPSULE BY MOUTH EVERY DAY (Patient not taking: Reported on 2023) 90 capsule 3     No current facility-administered medications for this visit.       Past Medical History:  Past Medical History:   Diagnosis Date    Diabetes mellitus (CMS/HCC) (HCC)     Hypertension        Past Surgical History:  Past Surgical History:   Procedure Laterality Date    CARDIAC SURGERY  2016    Cardiac bypass X4 vessels    CARDIAC SURGERY  1997    2 stents    CATARACT EXTRACTION Left     CHOLECYSTECTOMY  2007    COLONOSCOPY      Approx 8 years ago with Dr Mcguire- was to have 5 year follow up    COLONOSCOPY  2015    Polyps        Social History:  Social History     Tobacco Use    Smoking status: Former     Packs/day: 0.00     Years: 20.00     Pack years: 0.00     Types: Cigarettes    Smokeless tobacco: Never   Substance Use Topics    Alcohol use: Yes     Comment: Occasionally       Family History:      Family History   Problem Relation Age of Onset    Heart disease Mother     Heart attack Father          Myocardial infarction    Diabetes type II Brother         Toes removed due to diabetes       Physical Exam:      /78 (BP Location: Left arm, Patient Position: Sitting, Cuff Size: Long Adult)   Pulse 85   Temp 36.6 °C (97.8 °F) (Temporal)   Ht 1.829 m (6')   Wt 95.3 kg (210 lb)   SpO2 94%   BMI 28.48 kg/m²   Constitutional: appears well-developed and well-nourished.   Eyes: Pupils are equal, round, and reactive to light. Conjunctiva moist, no discharge  HENT: atraumatic, oropharynx clear with most mucosa  Neck: Normal range of motion. Trachea midline, no thyromegaly or lymphadenopathy  Cardiovascular: RRR, no significant SEJM  Pulmonary/Chest: Effort normal. No intercoastal retractions; CTAB  Abdominal: Soft. Nontender, no masses or HSM  Genitourinary: Testis descended without masses; normal phallus without lesions  Extremity: No peripheral edema or extremity lymphadenopathy  Neurological: gait is normal, muscle strength normal, alert  Skin: Skin is warm. Dry, no rash  Psychiatric: normal mood and affect, oriented to person, place and time      Results:  CBC:   WBC   Date Value Ref Range Status   10/12/2022 5.8 3.7 - 9.6 10*3/uL Final     RBC   Date Value Ref Range Status   10/12/2022 4.58 4.10 - 5.80 10*6/µL Final     Hemoglobin   Date Value Ref Range Status   10/12/2022 14.0 13.2 - 17.2 g/dL Final     Hematocrit   Date Value Ref Range Status   10/12/2022 42.0 38.0 - 50.0 % Final     Platelets   Date Value Ref Range Status   10/12/2022 247 130 - 350 10*3/uL Final     CMP:   Sodium   Date Value Ref Range Status   10/04/2022 133 128 - 145 mmol/L Final     Potassium   Date Value Ref Range Status   10/04/2022 4.5 3.5 - 5.1 MMOL/L Final     BUN   Date Value Ref Range Status   10/04/2022 16 7 - 25 mg/dL Final     Creatinine   Date Value Ref Range Status   10/04/2022 0.60 (L) 0.70 - 1.30 mg/dL Final     Calcium   Date Value Ref Range Status   10/04/2022 9.3 8.6 - 10.3 mg/dL Final     Alkaline Phosphatase    Date Value Ref Range Status   09/22/2022 76 45 - 115 U/L Final     Total Bilirubin   Date Value Ref Range Status   09/22/2022 1.80 (H) 0.20 - 1.40 mg/dL Final     ALT (SGPT)   Date Value Ref Range Status   09/22/2022 32 10 - 47 U/L Final     AST   Date Value Ref Range Status   09/22/2022 34 <40 U/L Final     BMP:   Sodium   Date Value Ref Range Status   10/04/2022 133 128 - 145 mmol/L Final     Potassium   Date Value Ref Range Status   10/04/2022 4.5 3.5 - 5.1 MMOL/L Final     BUN   Date Value Ref Range Status   10/04/2022 16 7 - 25 mg/dL Final     Creatinine   Date Value Ref Range Status   10/04/2022 0.60 (L) 0.70 - 1.30 mg/dL Final     Calcium   Date Value Ref Range Status   10/04/2022 9.3 8.6 - 10.3 mg/dL Final       Creatinine   Date Value Ref Range Status   10/04/2022 0.60 (L) 0.70 - 1.30 mg/dL Final   09/22/2022 1.30 0.70 - 1.30 mg/dL Final   06/08/2022 0.90 0.70 - 1.30 mg/dL Final   05/05/2021 1.10 0.70 - 1.30 mg/dL Final   10/15/2020 0.60 (L) 0.70 - 1.30 mg/dL Final     Lab Results   Component Value Date    PSA 1.29 09/24/2019    PSA 1.18 10/09/2018       Assessment & Plan:  78 y.o. male presenting with lower urinary tract symptoms.    We had a detailed discussion regarding the natural history, pathophysiology, and prognosis regarding BPH with lower urinary tract symptoms.  Management options discussed, including observation, medication, and surgery. We discussed bladder remodeling from chronic high pressure voiding.  At this time Abhilash will continue on 0.8 mg Flomax with the addition of double voiding for more complete emptying of his bladder. He was educated on how to do so in clinic and was agreeable to this today. He was also started on Finasteride today to aid in shrinking of his prostate. An updated PSA will be obtained today to establish patient baseline before initiation. He expresses no additional questions or concerns at this time and is agreeable to aforementioned plan of care.    Patient will  follow-up in 3 months with PVR and urinalysis.      ---------------------------------  Arianna Duke, HADLEY  02/27/23 11:03 AM

## 2023-03-10 ENCOUNTER — OFFICE VISIT (OUTPATIENT)
Dept: FAMILY MEDICINE | Facility: CLINIC | Age: 78
End: 2023-03-10
Payer: MEDICARE

## 2023-03-10 ENCOUNTER — LAB (OUTPATIENT)
Dept: LAB | Facility: CLINIC | Age: 78
End: 2023-03-10
Payer: MEDICARE

## 2023-03-10 VITALS
WEIGHT: 225 LBS | TEMPERATURE: 98.4 F | HEART RATE: 78 BPM | OXYGEN SATURATION: 93 % | SYSTOLIC BLOOD PRESSURE: 140 MMHG | DIASTOLIC BLOOD PRESSURE: 76 MMHG | BODY MASS INDEX: 30.52 KG/M2

## 2023-03-10 DIAGNOSIS — G47.00 INSOMNIA, UNSPECIFIED TYPE: ICD-10-CM

## 2023-03-10 DIAGNOSIS — R35.0 BENIGN PROSTATIC HYPERPLASIA WITH URINARY FREQUENCY: ICD-10-CM

## 2023-03-10 DIAGNOSIS — I10 ESSENTIAL HYPERTENSION: ICD-10-CM

## 2023-03-10 DIAGNOSIS — N40.1 BENIGN PROSTATIC HYPERPLASIA WITH URINARY FREQUENCY: ICD-10-CM

## 2023-03-10 DIAGNOSIS — E11.9 TYPE 2 DIABETES MELLITUS WITHOUT COMPLICATION, WITHOUT LONG-TERM CURRENT USE OF INSULIN (CMS/HCC): Primary | ICD-10-CM

## 2023-03-10 DIAGNOSIS — N40.1 BENIGN PROSTATIC HYPERPLASIA WITH LOWER URINARY TRACT SYMPTOMS, SYMPTOM DETAILS UNSPECIFIED: ICD-10-CM

## 2023-03-10 DIAGNOSIS — K21.9 GASTROESOPHAGEAL REFLUX DISEASE, UNSPECIFIED WHETHER ESOPHAGITIS PRESENT: ICD-10-CM

## 2023-03-10 DIAGNOSIS — E11.9 TYPE 2 DIABETES MELLITUS WITHOUT COMPLICATION, WITHOUT LONG-TERM CURRENT USE OF INSULIN (CMS/HCC): ICD-10-CM

## 2023-03-10 LAB
ANION GAP SERPL CALC-SCNC: 9 MMOL/L (ref 3–11)
BUN SERPL-MCNC: 17 MG/DL (ref 7–25)
CALCIUM SERPL-MCNC: 9.9 MG/DL (ref 8.6–10.3)
CHLORIDE SERPL-SCNC: 104 MMOL/L (ref 98–107)
CO2 SERPL-SCNC: 24 MMOL/L (ref 21–32)
CREAT SERPL-MCNC: 0.5 MG/DL (ref 0.7–1.3)
EST. AVERAGE GLUCOSE BLD GHB EST-MCNC: 171.4 MG/DL
GFR SERPL CREATININE-BSD FRML MDRD: 104 ML/MIN/1.73M*2
GLUCOSE SERPL-MCNC: 216 MG/DL (ref 70–105)
HBA1C MFR BLD: 7.6 % (ref 4.8–6)
POTASSIUM SERPL-SCNC: 4.6 MMOL/L (ref 3.5–5.1)
PSA SERPL-MCNC: 1.07 NG/ML (ref 0–4)
SODIUM SERPL-SCNC: 137 MMOL/L (ref 128–145)

## 2023-03-10 PROCEDURE — 84153 ASSAY OF PSA TOTAL: CPT | Performed by: FAMILY MEDICINE

## 2023-03-10 PROCEDURE — 83036 HEMOGLOBIN GLYCOSYLATED A1C: CPT | Performed by: NURSE PRACTITIONER

## 2023-03-10 PROCEDURE — 99214 OFFICE O/P EST MOD 30 MIN: CPT | Performed by: NURSE PRACTITIONER

## 2023-03-10 PROCEDURE — 36415 COLL VENOUS BLD VENIPUNCTURE: CPT | Performed by: FAMILY MEDICINE

## 2023-03-10 PROCEDURE — 80048 BASIC METABOLIC PNL TOTAL CA: CPT | Performed by: NURSE PRACTITIONER

## 2023-03-10 RX ORDER — CALC/MAG/B COMPLEX/D3/HERB 61
15 TABLET ORAL DAILY
Qty: 30 CAPSULE | Refills: 2 | Status: SHIPPED | OUTPATIENT
Start: 2023-03-10 | End: 2024-03-09

## 2023-03-10 RX ORDER — LISINOPRIL 10 MG/1
10 TABLET ORAL DAILY
Qty: 90 TABLET | Refills: 0 | Status: SHIPPED | OUTPATIENT
Start: 2023-03-10 | End: 2023-07-20

## 2023-03-10 RX ORDER — METOPROLOL TARTRATE 25 MG/1
25 TABLET, FILM COATED ORAL 2 TIMES DAILY
Qty: 180 TABLET | Refills: 0 | Status: SHIPPED | OUTPATIENT
Start: 2023-03-10 | End: 2023-06-19 | Stop reason: SDUPTHER

## 2023-03-10 RX ORDER — AMITRIPTYLINE HYDROCHLORIDE 10 MG/1
10 TABLET, FILM COATED ORAL NIGHTLY
COMMUNITY
End: 2023-03-10 | Stop reason: SDUPTHER

## 2023-03-10 RX ORDER — METFORMIN HYDROCHLORIDE 500 MG/1
1000 TABLET, EXTENDED RELEASE ORAL 2 TIMES DAILY
Qty: 360 TABLET | Refills: 0 | Status: SHIPPED | OUTPATIENT
Start: 2023-03-10 | End: 2023-08-23 | Stop reason: SDUPTHER

## 2023-03-10 RX ORDER — TAMSULOSIN HYDROCHLORIDE 0.4 MG/1
0.8 CAPSULE ORAL
Qty: 180 CAPSULE | Refills: 0 | Status: SHIPPED | OUTPATIENT
Start: 2023-03-10 | End: 2023-07-28

## 2023-03-10 RX ORDER — AMITRIPTYLINE HYDROCHLORIDE 10 MG/1
10 TABLET, FILM COATED ORAL NIGHTLY PRN
Qty: 30 TABLET | Refills: 1 | Status: SHIPPED | OUTPATIENT
Start: 2023-03-10 | End: 2023-09-19

## 2023-03-10 RX ORDER — EMPAGLIFLOZIN 25 MG/1
1 TABLET, FILM COATED ORAL DAILY
Qty: 90 TABLET | Refills: 0 | Status: SHIPPED | OUTPATIENT
Start: 2023-03-10 | End: 2023-07-20

## 2023-03-10 NOTE — PROGRESS NOTES
SUBJECTIVE     Abhilash Huggins is a 78 y.o. male who presents for diabetes follow up.    HPI:     Patient presents today for his diabetic follow-up and medication refill.  Patient denies any specific concerns or complaints today.  He feels in his normal state of health.  He is tolerating his medications.  He ran out of metoprolol a few weeks ago.  Insurance is no longer covering his Dexilant, he is wondering about a different prescription.  Using over-the-counter omeprazole at this time and symptoms are stable.    Patient reports his metformin dose was decreased to 500 mg twice daily at last visit.  States he requested this change because he does not like being on his medications.  He tolerates metformin without any significant GI side effects.  He is agreeable to go back up to 1000 mg twice daily if necessary.  Admits his diet has not been well controlled over the past few months.  He does not follow a diabetic diet.  He is not participating in any routine physical exercise.    Amitriptyline has been helpful for sleep.  He uses this as needed.    He was evaluated by urology and is doing well on his tamsulosin and finasteride.    Denies any new or concerning symptoms including chest pain, pressure, palpitations at rest or with activity.  No hematuria, dysuria, polyuria, polyphagia, polydipsia.         Allergies   Allergen Reactions    Amoxicillin      HIVES, NAUSEA    Penicillins      HIVES, NAUSEA       Current Outpatient Medications   Medication Sig Dispense Refill    finasteride (PROSCAR) 5 mg tablet Take 1 tablet (5 mg total) by mouth daily 90 tablet 3    atorvastatin (LIPITOR) 80 mg tablet TAKE ONE TABLET BY MOUTH EVERY DAY 90 tablet 3    clopidogreL (PLAVIX) 75 mg tablet TAKE ONE TABLET BY MOUTH EVERY DAY 90 tablet 3    Rhopressa 0.02 % drops SMARTSI Drop(s) Left Eye Every Evening      amitriptyline (ELAVIL) 10 mg tablet Take 1 tablet (10 mg total) by mouth nightly as needed for sleep 30 tablet 1    metFORMIN  XR (GLUCOPHAGE-XR) 500 mg 24 hr tablet Take 2 tablets (1,000 mg total) by mouth 2 (two) times a day 360 tablet 0    empagliflozin (Jardiance) 25 mg tablet Take 1 tablet (25 mg total) by mouth daily 90 tablet 0    lisinopriL (PRINIVIL,ZESTRIL) 10 mg tablet Take 1 tablet (10 mg total) by mouth daily 90 tablet 0    metoprolol tartrate (LOPRESSOR) 25 mg tablet Take 1 tablet (25 mg total) by mouth 2 (two) times a day 180 tablet 0    tamsulosin (FLOMAX) 0.4 mg capsule Take 2 capsules (0.8 mg total) by mouth daily with dinner 180 capsule 0    lansoprazole (Prevacid) 15 mg capsule Take 1 capsule (15 mg total) by mouth daily 30 capsule 2     No current facility-administered medications for this visit.       Past Medical History:   Diagnosis Date    Diabetes mellitus (CMS/HCC) (HCC)     Hypertension        Past Surgical History:   Procedure Laterality Date    CARDIAC SURGERY  01/04/2016    Cardiac bypass X4 vessels    CARDIAC SURGERY  01/01/1997    2 stents    CATARACT EXTRACTION Left     CHOLECYSTECTOMY  02/14/2007    COLONOSCOPY      Approx 8 years ago with Dr Mcguire- was to have 5 year follow up    COLONOSCOPY  01/01/2015    Polyps       Social History     Socioeconomic History    Marital status:    Tobacco Use    Smoking status: Former     Packs/day: 0.00     Years: 20.00     Pack years: 0.00     Types: Cigarettes    Smokeless tobacco: Never   Vaping Use    Vaping Use: Never used   Substance and Sexual Activity    Alcohol use: Yes     Comment: Occasionally    Drug use: No    Sexual activity: Defer     Social Determinants of Health     Tobacco Use: Medium Risk    Smoking Tobacco Use: Former    Smokeless Tobacco Use: Never       Review of Systems  As noted in HPI    OBJECTIVE  /76 (BP Location: Left arm, Patient Position: Sitting, Cuff Size: Large Adult)   Pulse 78   Temp 36.9 °C (98.4 °F) (Temporal)   Wt 102.1 kg (225 lb)   SpO2 93%   BMI 30.52 kg/m²     Physical Exam  Vitals and nursing note reviewed.    Constitutional:       Appearance: Normal appearance.   HENT:      Head: Normocephalic.      Right Ear: External ear normal.      Left Ear: External ear normal.      Nose: Nose normal.      Mouth/Throat:      Mouth: Mucous membranes are moist.   Eyes:      Extraocular Movements: Extraocular movements intact.      Conjunctiva/sclera: Conjunctivae normal.      Pupils: Pupils are equal, round, and reactive to light.   Neck:      Thyroid: No thyromegaly.   Cardiovascular:      Rate and Rhythm: Normal rate and regular rhythm.      Heart sounds: No murmur heard.  Pulmonary:      Effort: Pulmonary effort is normal.      Breath sounds: Normal breath sounds.   Abdominal:      General: There is no distension.      Palpations: Abdomen is soft.      Tenderness: There is no abdominal tenderness.   Musculoskeletal:      Cervical back: Normal range of motion.   Lymphadenopathy:      Cervical: No cervical adenopathy.   Skin:     General: Skin is warm.      Capillary Refill: Capillary refill takes less than 2 seconds.      Findings: No rash.   Neurological:      General: No focal deficit present.      Mental Status: He is alert and oriented to person, place, and time.   Psychiatric:         Mood and Affect: Mood normal.         Behavior: Behavior normal.       ASSESSMENT AND PLAN     1. Type 2 diabetes mellitus without complication, without long-term current use of insulin (CMS/[facility]) ([facility])  - A1c 7.6, not following diabetic diet. Agreeable to go back to 1,000 mg Metformin BID and follow up in 3 months.   - metFORMIN XR (GLUCOPHAGE-XR) 500 mg 24 hr tablet; Take 2 tablets (1,000 mg total) by mouth 2 (two) times a day  Dispense: 360 tablet; Refill: 0  - empagliflozin (Jardiance) 25 mg tablet; Take 1 tablet (25 mg total) by mouth daily  Dispense: 90 tablet; Refill: 0    2. Essential hypertension  - Above goal today, ran out of metoprolol. No new or concerning symptoms.  - lisinopriL (PRINIVIL,ZESTRIL) 10 mg tablet; Take 1 tablet (10 mg  total) by mouth daily  Dispense: 90 tablet; Refill: 0  - metoprolol tartrate (LOPRESSOR) 25 mg tablet; Take 1 tablet (25 mg total) by mouth 2 (two) times a day  Dispense: 180 tablet; Refill: 0    3. Benign prostatic hyperplasia with urinary frequency  - Urology evaluation, tolerating medication   - tamsulosin (FLOMAX) 0.4 mg capsule; Take 2 capsules (0.8 mg total) by mouth daily with dinner  Dispense: 180 capsule; Refill: 0    4. Gastroesophageal reflux disease, unspecified whether esophagitis present  - Symptom improvement with OTC treatment, requests new rx   - lansoprazole (Prevacid) 15 mg capsule; Take 1 capsule (15 mg total) by mouth daily  Dispense: 30 capsule; Refill: 2    5. Insomnia, unspecified type  - tolerating medication and finds benefit. Would like refill.   - amitriptyline (ELAVIL) 10 mg tablet; Take 1 tablet (10 mg total) by mouth nightly as needed for sleep  Dispense: 30 tablet; Refill: 1        Clarissa Molina CNP    A voice recognition program may have been used to aid in documentation. Words are not always transcribed exactly as spoken. Errors may be present despite efforts to correct and should be taken within the context of the discussion. Please contact the provider if you need assistance interpreting this documentation.

## 2023-06-07 ENCOUNTER — OFFICE VISIT (OUTPATIENT)
Dept: UROLOGY | Facility: CLINIC | Age: 78
End: 2023-06-07
Payer: MEDICARE

## 2023-06-07 ENCOUNTER — LAB (OUTPATIENT)
Dept: LAB | Facility: CLINIC | Age: 78
End: 2023-06-07
Payer: MEDICARE

## 2023-06-07 VITALS
HEART RATE: 60 BPM | WEIGHT: 225 LBS | BODY MASS INDEX: 30.48 KG/M2 | SYSTOLIC BLOOD PRESSURE: 152 MMHG | TEMPERATURE: 97.2 F | DIASTOLIC BLOOD PRESSURE: 80 MMHG | RESPIRATION RATE: 20 BRPM | HEIGHT: 72 IN | OXYGEN SATURATION: 94 %

## 2023-06-07 DIAGNOSIS — N40.1 BENIGN PROSTATIC HYPERPLASIA WITH LOWER URINARY TRACT SYMPTOMS, SYMPTOM DETAILS UNSPECIFIED: ICD-10-CM

## 2023-06-07 DIAGNOSIS — N40.1 BENIGN PROSTATIC HYPERPLASIA WITH URINARY FREQUENCY: ICD-10-CM

## 2023-06-07 DIAGNOSIS — R35.0 BENIGN PROSTATIC HYPERPLASIA WITH URINARY FREQUENCY: ICD-10-CM

## 2023-06-07 LAB
BACTERIA #/AREA URNS HPF: NORMAL /HPF
BILIRUB UR QL: NEGATIVE
CLARITY UR: CLEAR
COLOR UR: YELLOW
GLUCOSE UR QL: >=1000 MG/DL
HGB UR QL: NEGATIVE
KETONES UR-MCNC: ABNORMAL MG/DL
LEUKOCYTE ESTERASE UR QL STRIP: NEGATIVE
NITRITE UR QL: NEGATIVE
PH UR: 5.5 PH
PROT UR STRIP-MCNC: NEGATIVE MG/DL
RBC #/AREA URNS HPF: NORMAL /HPF
SP GR UR: 1.01 (ref 1–1.03)
SPECIMEN VOL ?TM UR: 296 ML
SQUAMOUS #/AREA URNS HPF: NORMAL /HPF
UROBILINOGEN UR-MCNC: 0.2 MG/DL
WBC #/AREA URNS HPF: NORMAL /HPF

## 2023-06-07 PROCEDURE — G0463 HOSPITAL OUTPT CLINIC VISIT: HCPCS

## 2023-06-07 PROCEDURE — 99213 OFFICE O/P EST LOW 20 MIN: CPT

## 2023-06-07 PROCEDURE — 51798 US URINE CAPACITY MEASURE: CPT

## 2023-06-07 PROCEDURE — 81001 URINALYSIS AUTO W/SCOPE: CPT

## 2023-06-07 ASSESSMENT — PAIN SCALES - GENERAL: PAINLEVEL: 0-NO PAIN

## 2023-06-07 NOTE — PATIENT INSTRUCTIONS
It was a pleasure seeing you in Urology today! Please to not hesitate to call if you have any questions or concerns regarding your plan of care.    Thank you,    Arianna Duke, CNP   [phone]

## 2023-06-09 ENCOUNTER — TELEPHONE (OUTPATIENT)
Dept: FAMILY MEDICINE | Facility: CLINIC | Age: 78
End: 2023-06-09
Payer: MEDICARE

## 2023-06-09 NOTE — TELEPHONE ENCOUNTER
Abhilash took a fall on 6/8/23 & is having the same symptoms as he had in October, we did get Abhilash scheduled for Mon 6/12 @ 10 am  but is hoping to be able to have the same Steroid he was put on prescribed today instead of waiting all weekend. Since Dr. Brito is out of the office on Friday's can one of our other providers send this in today? Please call Clarissa if any questions or when his RX has been sent in.

## 2023-06-09 NOTE — TELEPHONE ENCOUNTER
I think he needs to be seen in UC or ER.  We need to make sure he does not have a fracture that could be causing some of the pain.

## 2023-06-12 ENCOUNTER — OFFICE VISIT (OUTPATIENT)
Dept: FAMILY MEDICINE | Facility: CLINIC | Age: 78
End: 2023-06-12
Payer: MEDICARE

## 2023-06-12 ENCOUNTER — LAB (OUTPATIENT)
Dept: LAB | Facility: CLINIC | Age: 78
End: 2023-06-12
Payer: MEDICARE

## 2023-06-12 VITALS
RESPIRATION RATE: 18 BRPM | BODY MASS INDEX: 29.57 KG/M2 | OXYGEN SATURATION: 95 % | DIASTOLIC BLOOD PRESSURE: 68 MMHG | HEART RATE: 79 BPM | TEMPERATURE: 98.7 F | SYSTOLIC BLOOD PRESSURE: 124 MMHG | WEIGHT: 218 LBS

## 2023-06-12 DIAGNOSIS — E03.9 HYPOTHYROIDISM, UNSPECIFIED TYPE: ICD-10-CM

## 2023-06-12 DIAGNOSIS — R79.89 ELEVATED TSH: ICD-10-CM

## 2023-06-12 DIAGNOSIS — R63.0 LOSS OF APPETITE: ICD-10-CM

## 2023-06-12 DIAGNOSIS — R79.89 ELEVATED SERUM CREATININE: Primary | ICD-10-CM

## 2023-06-12 DIAGNOSIS — E11.9 TYPE 2 DIABETES MELLITUS WITHOUT COMPLICATION, WITHOUT LONG-TERM CURRENT USE OF INSULIN (CMS/HCC): ICD-10-CM

## 2023-06-12 DIAGNOSIS — R93.89 ABNORMAL CHEST CT: ICD-10-CM

## 2023-06-12 DIAGNOSIS — R70.0 ELEVATED ERYTHROCYTE SEDIMENTATION RATE: ICD-10-CM

## 2023-06-12 DIAGNOSIS — E11.9 TYPE 2 DIABETES MELLITUS WITHOUT COMPLICATION, WITHOUT LONG-TERM CURRENT USE OF INSULIN (CMS/HCC): Primary | ICD-10-CM

## 2023-06-12 DIAGNOSIS — R53.1 EPISODIC WEAKNESS: ICD-10-CM

## 2023-06-12 LAB
ALBUMIN SERPL-MCNC: 3.8 G/DL (ref 3.3–5.5)
ALP SERPL-CCNC: 55 U/L (ref 45–115)
ALT SERPL-CCNC: 31 U/L (ref 10–47)
ANION GAP SERPL CALC-SCNC: 10 MMOL/L (ref 3–11)
AST SERPL-CCNC: 35 U/L
BASOPHILS # BLD AUTO: 0.02 10*3/UL
BASOPHILS NFR BLD AUTO: 0.4 % (ref 0–2)
BILIRUB SERPL-MCNC: 1.7 MG/DL (ref 0.2–1.4)
BUN SERPL-MCNC: 19 MG/DL (ref 7–25)
CALCIUM ALBUM COR SERPL-MCNC: 10 MG/DL (ref 8.6–10.3)
CALCIUM SERPL-MCNC: 9.8 MG/DL (ref 8.6–10.3)
CHLORIDE SERPL-SCNC: 97 MMOL/L (ref 98–107)
CO2 SERPL-SCNC: 25 MMOL/L (ref 21–32)
CREAT SERPL-MCNC: 1.4 MG/DL (ref 0.7–1.3)
CRP SERPL-MCNC: 142 MG/L
EOSINOPHIL # BLD AUTO: 0.05 10*3/UL
EOSINOPHIL NFR BLD AUTO: 1.1 % (ref 0–3)
ERYTHROCYTE [DISTWIDTH] IN BLOOD BY AUTOMATED COUNT: 13.7 % (ref 11.5–15)
ERYTHROCYTE [SEDIMENTATION RATE] IN BLOOD: 58 MM/HR
EST. AVERAGE GLUCOSE BLD GHB EST-MCNC: 182.9 MG/DL
GFR SERPL CREATININE-BSD FRML MDRD: 51 ML/MIN/1.73M*2
GLUCOSE SERPL-MCNC: 212 MG/DL (ref 70–105)
HBA1C MFR BLD: 8 % (ref 4.8–6)
HCT VFR BLD AUTO: 42.2 % (ref 38–50)
HGB BLD-MCNC: 13.9 G/DL (ref 13.2–17.2)
LYMPHOCYTES # BLD AUTO: 0.75 10*3/UL
LYMPHOCYTES NFR BLD AUTO: 16 % (ref 15–47)
MCH RBC QN AUTO: 30.8 PG (ref 29–34)
MCHC RBC AUTO-ENTMCNC: 32.9 G/DL (ref 32–36)
MCV RBC AUTO: 93.5 FL (ref 82–97)
MONOCYTES # BLD AUTO: 0.85 10*3/UL
MONOCYTES NFR BLD AUTO: 18.2 % (ref 5–13)
NEUTROPHILS # BLD AUTO: 3.02 10*3/UL
NEUTROPHILS NFR BLD AUTO: 64.4 % (ref 46–70)
PLATELET # BLD AUTO: 139 10*3/UL (ref 130–350)
PMV BLD AUTO: 7.2 FL (ref 6.9–10.8)
POTASSIUM SERPL-SCNC: 5.1 MMOL/L (ref 3.5–5.1)
PROT SERPL-MCNC: 8 G/DL (ref 6.4–8.1)
RBC # BLD AUTO: 4.51 10*6/ΜL (ref 4.1–5.8)
SODIUM SERPL-SCNC: 132 MMOL/L (ref 128–145)
T3FREE SERPL-MCNC: 2.67 PG/ML (ref 2–3.5)
T4 FREE SERPL-MCNC: 0.94 NG/DL (ref 0.65–1.44)
TSH SERPL DL<=0.05 MIU/L-ACNC: 18.77 UIU/ML (ref 0.34–4.82)
WBC # BLD AUTO: 4.7 10*3/UL (ref 3.7–9.6)

## 2023-06-12 PROCEDURE — 80053 COMPREHEN METABOLIC PANEL: CPT | Performed by: FAMILY MEDICINE

## 2023-06-12 PROCEDURE — 84443 ASSAY THYROID STIM HORMONE: CPT | Performed by: FAMILY MEDICINE

## 2023-06-12 PROCEDURE — 83036 HEMOGLOBIN GLYCOSYLATED A1C: CPT | Performed by: FAMILY MEDICINE

## 2023-06-12 PROCEDURE — 99214 OFFICE O/P EST MOD 30 MIN: CPT | Performed by: FAMILY MEDICINE

## 2023-06-12 PROCEDURE — 86140 C-REACTIVE PROTEIN: CPT | Performed by: FAMILY MEDICINE

## 2023-06-12 PROCEDURE — 84481 FREE ASSAY (FT-3): CPT | Performed by: FAMILY MEDICINE

## 2023-06-12 PROCEDURE — 85025 COMPLETE CBC W/AUTO DIFF WBC: CPT | Performed by: FAMILY MEDICINE

## 2023-06-12 PROCEDURE — 84439 ASSAY OF FREE THYROXINE: CPT | Performed by: FAMILY MEDICINE

## 2023-06-12 PROCEDURE — 36415 COLL VENOUS BLD VENIPUNCTURE: CPT | Performed by: FAMILY MEDICINE

## 2023-06-12 PROCEDURE — 85652 RBC SED RATE AUTOMATED: CPT | Performed by: FAMILY MEDICINE

## 2023-06-12 RX ORDER — LEVOTHYROXINE SODIUM 50 UG/1
50 TABLET ORAL DAILY
Qty: 60 TABLET | Refills: 0 | Status: SHIPPED | OUTPATIENT
Start: 2023-06-12 | End: 2024-04-12 | Stop reason: DRUGHIGH

## 2023-06-12 RX ORDER — PREDNISONE 20 MG/1
40 TABLET ORAL DAILY
Qty: 10 TABLET | Refills: 0 | Status: SHIPPED | OUTPATIENT
Start: 2023-06-12 | End: 2023-06-17

## 2023-06-12 ASSESSMENT — ENCOUNTER SYMPTOMS
SLEEP DISTURBANCE: 0
PALPITATIONS: 0
NUMBNESS: 0
UNEXPECTED WEIGHT CHANGE: 0
DIARRHEA: 0
BLOOD IN STOOL: 0
BRUISES/BLEEDS EASILY: 0
SHORTNESS OF BREATH: 0
CONSTIPATION: 0
DIAPHORESIS: 1
CHILLS: 1
NAUSEA: 0
HEMATURIA: 0
FREQUENCY: 1
COUGH: 0
WEAKNESS: 1
FATIGUE: 1
ACTIVITY CHANGE: 0
DYSPHORIC MOOD: 0
ABDOMINAL DISTENTION: 0
POLYDIPSIA: 0

## 2023-06-12 NOTE — PROGRESS NOTES
UROLOGY EXAM NOTE    HPI:  Abhilash Huggins is a 78 y.o. male seen in followup regarding BPH with lower urinary tract symptoms.    Patient was last seen within urology clinic 2/27/2023 where he complained of urologic symptoms progressively worsening x 2-3 years. Patient stated at this time his most bothersome symptoms included frequency of every few hours, urgency, nocturia, and feeling of incomplete bladder emptying. He denies history of gross hematuria, recurrent urinary tract infection, and notes his condition is aggravated by too many fluids before bedtime. Patient arrives on continued 0.8 mg Flomax with previous noted improvement in symptoms.  His last post void residual in clinic was 374 cc with initiation of finasteride at his last appointment. Patient arrives today with some reported improvement in his urinary symptoms. His bladder scan is slightly improved today at 296 cc.     Medications:  Current Outpatient Medications   Medication Sig Dispense Refill    amitriptyline (ELAVIL) 10 mg tablet Take 1 tablet (10 mg total) by mouth nightly as needed for sleep 30 tablet 1    metFORMIN XR (GLUCOPHAGE-XR) 500 mg 24 hr tablet Take 2 tablets (1,000 mg total) by mouth 2 (two) times a day 360 tablet 0    empagliflozin (Jardiance) 25 mg tablet Take 1 tablet (25 mg total) by mouth daily 90 tablet 0    lisinopriL (PRINIVIL,ZESTRIL) 10 mg tablet Take 1 tablet (10 mg total) by mouth daily 90 tablet 0    metoprolol tartrate (LOPRESSOR) 25 mg tablet Take 1 tablet (25 mg total) by mouth 2 (two) times a day 180 tablet 0    tamsulosin (FLOMAX) 0.4 mg capsule Take 2 capsules (0.8 mg total) by mouth daily with dinner 180 capsule 0    lansoprazole (Prevacid) 15 mg capsule Take 1 capsule (15 mg total) by mouth daily 30 capsule 2    finasteride (PROSCAR) 5 mg tablet Take 1 tablet (5 mg total) by mouth daily 90 tablet 3    atorvastatin (LIPITOR) 80 mg tablet TAKE ONE TABLET BY MOUTH EVERY DAY 90 tablet 3    clopidogreL (PLAVIX) 75 mg  tablet TAKE ONE TABLET BY MOUTH EVERY DAY 90 tablet 3    Rhopressa 0.02 % drops SMARTSI Drop(s) Left Eye Every Evening      predniSONE (DELTASONE) 20 mg tablet Take 2 tablets (40 mg total) by mouth daily for 5 days 10 tablet 0     No current facility-administered medications for this visit.       Allergies:  Allergies   Allergen Reactions    Amoxicillin      HIVES, NAUSEA    Penicillins      HIVES, NAUSEA       Past Medical History:  Past Medical History:   Diagnosis Date    Diabetes mellitus (CMS/HCC)     Hypertension        Past Surgical History:  Past Surgical History:   Procedure Laterality Date    CARDIAC SURGERY  2016    Cardiac bypass X4 vessels    CARDIAC SURGERY  1997    2 stents    CATARACT EXTRACTION Left     CHOLECYSTECTOMY  2007    COLONOSCOPY      Approx 8 years ago with Dr Mcguire- was to have 5 year follow up    COLONOSCOPY  2015    Polyps       Family History:    Family History   Problem Relation Age of Onset    Heart disease Mother     Heart attack Father         Myocardial infarction    Diabetes type II Brother         Toes removed due to diabetes       Social History:  Social History     Tobacco Use    Smoking status: Former     Packs/day: 0.00     Years: 20.00     Pack years: 0.00     Types: Cigarettes    Smokeless tobacco: Never   Vaping Use    Vaping Use: Never used   Substance Use Topics    Alcohol use: Yes     Comment: Occasionally       A medically appropriate review of systems was performed and negative except as per HPI or below:  Nocturia, incomplete emptying, frequency    Physical Exam  Vitals reviewed.   Constitutional:       Appearance: Normal appearance.   HENT:      Head: Normocephalic.   Pulmonary:      Effort: Pulmonary effort is normal.   Musculoskeletal:         General: Normal range of motion.      Cervical back: Normal range of motion.   Skin:     General: Skin is warm.      Capillary Refill: Capillary refill takes less than 2 seconds.    Neurological:      Mental Status: He is alert and oriented to person, place, and time.   Psychiatric:         Mood and Affect: Mood normal.         Behavior: Behavior normal.        Assessment and Plan:    Diagnoses and all orders for this visit:    Benign prostatic hyperplasia with lower urinary tract symptoms, symptom details unspecified  -     POCT Bladder Scan    Benign prostatic hyperplasia with urinary frequency          Patient will continue on previously prescribed Flomax 0.8 mg in conjunction with Proscar 5 mg daily. He has been on this medication for 3 months and will return in an additional 3 months for reevaluation of his PVR as his Proscar will have reached maximum efficacy by this time. Patient is encouraged to continue increased water consumption and double voiding to aid in urinary frequency and more complete emptying of his bladder. He is agreeable to the aforementioned plan of care and expresses no additional questions or concerns at this time. Patient may benefit from trial of Uroxatrol 10 mg should he continue to incompletely empty at his next appointment.    Patient to follow-up in 3 months with AUA, PVR, and urinalysis prior      Electronically signed by: Arianna Duke, HADLEY      Disclaimer:  This dictation was created using voice recognition software.  I have made every reasonable attempt to avoid errors, but this document may contain an error not identified before finalizing.  If the error changes the accuracy of the document, I would appreciate it being brought to my attention.

## 2023-06-12 NOTE — PROGRESS NOTES
Subjective      Abhilash Huggins is a 78 y.o. male who presents for follow-up for recurrent symptoms.  Onset was about 5 days ago reports significant intermittent weakness to the point he is unable to ambulate and had fallen.  Also symptoms reported of chills, shaking, diaphoresis and loss of appetite.  Very similar to what he experienced in October 2022 patient had lab and imaging evaluation significant for elevated inflammatory markers, positive PAMELA and CT did show some infiltrates bilaterally in his lungs.  Due to the infiltrates he was placed on azithromycin and cefdinir for suspected PMR he was given a prednisone burst regardless his symptoms had resolved after treatment, up until last week.    He had declined evaluation with rheumatologist in the fall.    He also has significant history of coronary artery disease, BPH, hypertension, hyperlipidemia, and type 2 diabetes.    HPI    The following have been reviewed and updated as appropriate in this visit:          Allergies   Allergen Reactions    Amoxicillin      HIVES, NAUSEA    Penicillins      HIVES, NAUSEA     Current Outpatient Medications   Medication Sig Dispense Refill    predniSONE (DELTASONE) 20 mg tablet Take 2 tablets (40 mg total) by mouth daily for 5 days 10 tablet 0    amitriptyline (ELAVIL) 10 mg tablet Take 1 tablet (10 mg total) by mouth nightly as needed for sleep 30 tablet 1    metFORMIN XR (GLUCOPHAGE-XR) 500 mg 24 hr tablet Take 2 tablets (1,000 mg total) by mouth 2 (two) times a day 360 tablet 0    empagliflozin (Jardiance) 25 mg tablet Take 1 tablet (25 mg total) by mouth daily 90 tablet 0    lisinopriL (PRINIVIL,ZESTRIL) 10 mg tablet Take 1 tablet (10 mg total) by mouth daily 90 tablet 0    metoprolol tartrate (LOPRESSOR) 25 mg tablet Take 1 tablet (25 mg total) by mouth 2 (two) times a day 180 tablet 0    tamsulosin (FLOMAX) 0.4 mg capsule Take 2 capsules (0.8 mg total) by mouth daily with dinner 180 capsule 0    lansoprazole (Prevacid) 15  mg capsule Take 1 capsule (15 mg total) by mouth daily 30 capsule 2    finasteride (PROSCAR) 5 mg tablet Take 1 tablet (5 mg total) by mouth daily 90 tablet 3    atorvastatin (LIPITOR) 80 mg tablet TAKE ONE TABLET BY MOUTH EVERY DAY 90 tablet 3    clopidogreL (PLAVIX) 75 mg tablet TAKE ONE TABLET BY MOUTH EVERY DAY 90 tablet 3    Rhopressa 0.02 % drops SMARTSI Drop(s) Left Eye Every Evening       No current facility-administered medications for this visit.     Past Medical History:   Diagnosis Date    Diabetes mellitus (CMS/HCC)     Hypertension      Past Surgical History:   Procedure Laterality Date    CARDIAC SURGERY  2016    Cardiac bypass X4 vessels    CARDIAC SURGERY  1997    2 stents    CATARACT EXTRACTION Left     CHOLECYSTECTOMY  2007    COLONOSCOPY      Approx 8 years ago with Dr Mcguire- was to have 5 year follow up    COLONOSCOPY  2015    Polyps     Family History   Problem Relation Age of Onset    Heart disease Mother     Heart attack Father         Myocardial infarction    Diabetes type II Brother         Toes removed due to diabetes     Social History     Socioeconomic History    Marital status:    Tobacco Use    Smoking status: Former     Packs/day: 0.00     Years: 20.00     Pack years: 0.00     Types: Cigarettes    Smokeless tobacco: Never   Vaping Use    Vaping Use: Never used   Substance and Sexual Activity    Alcohol use: Yes     Comment: Occasionally    Drug use: No    Sexual activity: Defer     Social Determinants of Health     Tobacco Use: Medium Risk (2023)    Patient History     Smoking Tobacco Use: Former     Smokeless Tobacco Use: Never       Review of Systems   Constitutional:  Positive for chills, diaphoresis and fatigue. Negative for activity change and unexpected weight change.   HENT:  Negative for congestion.    Eyes:  Negative for visual disturbance.   Respiratory:  Negative for cough and shortness of breath.    Cardiovascular:  Negative for  chest pain, palpitations and leg swelling.   Gastrointestinal:  Negative for abdominal distention, blood in stool, constipation, diarrhea and nausea.   Endocrine: Negative for polydipsia and polyuria.   Genitourinary:  Positive for frequency. Negative for hematuria.   Neurological:  Positive for weakness. Negative for numbness.   Hematological:  Does not bruise/bleed easily.   Psychiatric/Behavioral:  Negative for dysphoric mood and sleep disturbance.      As noted in HPI    Objective   /68 (BP Location: Right arm, Patient Position: Sitting, Cuff Size: Regular Adult)   Pulse 79   Temp 37.1 °C (98.7 °F)   Resp 18   Wt 98.9 kg (218 lb)   SpO2 95%   BMI 29.57 kg/m²     Physical Exam  Constitutional:       Appearance: Normal appearance.   HENT:      Mouth/Throat:      Mouth: Mucous membranes are moist.      Pharynx: Oropharynx is clear.   Cardiovascular:      Rate and Rhythm: Normal rate and regular rhythm.      Pulses: Normal pulses.   Pulmonary:      Effort: Pulmonary effort is normal.   Abdominal:      General: Bowel sounds are normal.   Musculoskeletal:         General: No tenderness.      Right lower leg: No edema.      Left lower leg: No edema.   Skin:     General: Skin is warm and dry.      Capillary Refill: Capillary refill takes less than 2 seconds.   Neurological:      Mental Status: He is alert and oriented to person, place, and time.      Coordination: Coordination normal.      Gait: Gait abnormal.      Comments: Walking slowly ambulates with cane         ASSESSMENT AND PLAN   Diagnoses and all orders for this visit:    Type 2 diabetes mellitus without complication, without long-term current use of insulin (CMS/[facility])  -     Hemoglobin A1c (glycosylated) Blood, Venous  -     Comprehensive metabolic panel Blood, Venous  -     Thyroid Stimulating Hormone, Ultrasensitive Blood, Venous    Loss of appetite    Elevated erythrocyte sedimentation rate  -     C-reactive protein (Inflammation) Blood,  Venous  -     Sedimentation rate, automated Blood, Venous  -     predniSONE (DELTASONE) 20 mg tablet; Take 2 tablets (40 mg total) by mouth daily for 5 days  -     Ambulatory referral to Rheumatology; Future    Episodic weakness  -     CBC w/auto differential Blood, Venous  -     Comprehensive metabolic panel Blood, Venous  -     Thyroid Stimulating Hormone, Ultrasensitive Blood, Venous  -     C-reactive protein (Inflammation) Blood, Venous  -     Sedimentation rate, automated Blood, Venous  -     predniSONE (DELTASONE) 20 mg tablet; Take 2 tablets (40 mg total) by mouth daily for 5 days  -     Ambulatory referral to Rheumatology; Future    Abnormal chest CT  -     CT chest with IV contrast; Future      Recommended updated lab evaluation with reports of significant weakness, diaphoresis, and chills.    Discussed past follow-up recommendations for chest CT we will get this ordered.    Rheumatology consultation placed      We will trial the patient on short prednisone burst to see if his symptoms improve well on this.      He is to be seen urgently if symptoms worsen follow-up in 1 week if no improvement    Karli Brito MD      A voice recognition program may have been used to aid in documentation. Words are not always transcribed exactly as spoken. Errors may be present despite efforts to correct and should be taken within the context of the discussion. Please contact the provider if you need assistance interpreting this documentation

## 2023-06-16 ENCOUNTER — HOSPITAL ENCOUNTER (OUTPATIENT)
Dept: CT IMAGING | Facility: HOSPITAL | Age: 78
Discharge: HOME | End: 2023-06-16
Payer: MEDICARE

## 2023-06-16 DIAGNOSIS — R93.89 ABNORMAL CHEST CT: ICD-10-CM

## 2023-06-16 PROCEDURE — 71260 CT THORAX DX C+: CPT | Mod: 26,MG | Performed by: RADIOLOGY

## 2023-06-16 PROCEDURE — G1004 CDSM NDSC: HCPCS | Performed by: RADIOLOGY

## 2023-06-16 PROCEDURE — 2550000100 HC RX 255: Performed by: FAMILY MEDICINE

## 2023-06-16 PROCEDURE — 71260 CT THORAX DX C+: CPT | Mod: MG

## 2023-06-16 RX ORDER — IOPAMIDOL 755 MG/ML
80 INJECTION, SOLUTION INTRAVASCULAR ONCE
Status: COMPLETED | OUTPATIENT
Start: 2023-06-16 | End: 2023-06-16

## 2023-06-16 RX ADMIN — IOPAMIDOL 80 ML: 755 INJECTION, SOLUTION INTRAVENOUS at 09:30

## 2023-06-19 DIAGNOSIS — I10 ESSENTIAL HYPERTENSION: ICD-10-CM

## 2023-06-19 RX ORDER — METOPROLOL TARTRATE 25 MG/1
TABLET, FILM COATED ORAL
Qty: 180 TABLET | Refills: 0 | OUTPATIENT
Start: 2023-06-19

## 2023-06-19 RX ORDER — METOPROLOL TARTRATE 25 MG/1
25 TABLET, FILM COATED ORAL 2 TIMES DAILY
Qty: 180 TABLET | Refills: 0 | Status: SHIPPED | OUTPATIENT
Start: 2023-06-19 | End: 2023-09-18

## 2023-06-19 NOTE — TELEPHONE ENCOUNTER
Care Due:                  Date            Visit Type   Department     Provider  --------------------------------------------------------------------------------                              ESTABLISHED                              PATIENT      [id number]  Last Visit: 06-      EXTENDED     MEDICINE       CAROL JENKINS  Next Visit: None Scheduled  None         None Found                                                            Last  Test          Frequency    Reason                     Performed    Due Date  --------------------------------------------------------------------------------  Lipid Panel.  12 months..  atorvastatin.............  Not Found    Overdue    [employer] Embedded Care Due Messages. Reference number: [id number]. 6/19/2023 8:04:56 AM RYAN

## 2023-06-21 ENCOUNTER — TELEPHONE (OUTPATIENT)
Dept: FAMILY MEDICINE | Facility: CLINIC | Age: 78
End: 2023-06-21
Payer: MEDICARE

## 2023-06-21 NOTE — TELEPHONE ENCOUNTER
Clarissa would like to know the results of the CT Scan for Abhilash. She saw it on My Chart but doesn't understand it.

## 2023-07-20 DIAGNOSIS — E11.9 TYPE 2 DIABETES MELLITUS WITHOUT COMPLICATION, WITHOUT LONG-TERM CURRENT USE OF INSULIN (CMS/HCC): ICD-10-CM

## 2023-07-20 DIAGNOSIS — I10 ESSENTIAL HYPERTENSION: ICD-10-CM

## 2023-07-20 RX ORDER — LISINOPRIL 10 MG/1
10 TABLET ORAL DAILY
Qty: 30 TABLET | Refills: 0 | Status: SHIPPED | OUTPATIENT
Start: 2023-07-20 | End: 2023-08-28

## 2023-07-20 RX ORDER — EMPAGLIFLOZIN 25 MG/1
1 TABLET, FILM COATED ORAL DAILY
Qty: 30 TABLET | Refills: 0 | Status: SHIPPED | OUTPATIENT
Start: 2023-07-20 | End: 2023-08-23 | Stop reason: SDUPTHER

## 2023-07-20 NOTE — TELEPHONE ENCOUNTER
No care due was identified.  [facility] Embedded Care Due Messages. Reference number: [id number]. 7/20/2023 8:29:36 AM RYAN

## 2023-07-27 DIAGNOSIS — N40.1 BENIGN PROSTATIC HYPERPLASIA WITH URINARY FREQUENCY: ICD-10-CM

## 2023-07-27 DIAGNOSIS — R35.0 BENIGN PROSTATIC HYPERPLASIA WITH URINARY FREQUENCY: ICD-10-CM

## 2023-07-27 NOTE — TELEPHONE ENCOUNTER
No care due was identified.  [facility] Embedded Care Due Messages. Reference number: [id number]. 7/27/2023 8:07:50 AM RYAN

## 2023-07-28 RX ORDER — TAMSULOSIN HYDROCHLORIDE 0.4 MG/1
CAPSULE ORAL
Qty: 180 CAPSULE | Refills: 0 | Status: SHIPPED | OUTPATIENT
Start: 2023-07-28 | End: 2023-11-01

## 2023-08-06 ENCOUNTER — HEALTH MAINTENANCE LETTER (OUTPATIENT)
Age: 78
End: 2023-08-06

## 2023-08-14 ENCOUNTER — TELEPHONE (OUTPATIENT)
Dept: UROLOGY | Facility: CLINIC | Age: 78
End: 2023-08-14
Payer: MEDICARE

## 2023-08-14 NOTE — TELEPHONE ENCOUNTER
Called patient and lvm. Need to reschedule 9/8 appt with Arianna Duke and reschedule to next available established appt or to a Procedure/Telemedicine appt per Case Velazco.

## 2023-08-23 ENCOUNTER — OFFICE VISIT (OUTPATIENT)
Dept: FAMILY MEDICINE | Facility: CLINIC | Age: 78
End: 2023-08-23
Payer: MEDICARE

## 2023-08-23 ENCOUNTER — LAB (OUTPATIENT)
Dept: LAB | Facility: CLINIC | Age: 78
End: 2023-08-23
Payer: MEDICARE

## 2023-08-23 VITALS
RESPIRATION RATE: 18 BRPM | BODY MASS INDEX: 30.38 KG/M2 | WEIGHT: 224 LBS | TEMPERATURE: 98 F | HEART RATE: 74 BPM | OXYGEN SATURATION: 93 % | DIASTOLIC BLOOD PRESSURE: 78 MMHG | SYSTOLIC BLOOD PRESSURE: 138 MMHG

## 2023-08-23 DIAGNOSIS — Z00.00 ROUTINE MEDICAL EXAM: Primary | ICD-10-CM

## 2023-08-23 DIAGNOSIS — R79.89 ELEVATED SERUM CREATININE: ICD-10-CM

## 2023-08-23 DIAGNOSIS — E11.9 TYPE 2 DIABETES MELLITUS WITHOUT COMPLICATION, WITHOUT LONG-TERM CURRENT USE OF INSULIN (CMS/HCC): ICD-10-CM

## 2023-08-23 DIAGNOSIS — E03.9 HYPOTHYROIDISM, UNSPECIFIED TYPE: ICD-10-CM

## 2023-08-23 DIAGNOSIS — R70.0 ELEVATED ERYTHROCYTE SEDIMENTATION RATE: Primary | ICD-10-CM

## 2023-08-23 DIAGNOSIS — R79.89 ELEVATED TSH: ICD-10-CM

## 2023-08-23 LAB
ANION GAP SERPL CALC-SCNC: 12 MMOL/L (ref 3–11)
BUN SERPL-MCNC: 14 MG/DL (ref 7–25)
CALCIUM SERPL-MCNC: 9.2 MG/DL (ref 8.6–10.3)
CHLORIDE SERPL-SCNC: 101 MMOL/L (ref 98–107)
CO2 SERPL-SCNC: 27 MMOL/L (ref 21–32)
CREAT SERPL-MCNC: 0.8 MG/DL (ref 0.7–1.3)
CRP SERPL-MCNC: 1.8 MG/L
EGFRCR SERPLBLD CKD-EPI 2021: 91 ML/MIN/1.73M*2
ERYTHROCYTE [SEDIMENTATION RATE] IN BLOOD: 6 MM/HR
GLUCOSE SERPL-MCNC: 199 MG/DL (ref 70–105)
POTASSIUM SERPL-SCNC: 5 MMOL/L (ref 3.5–5.1)
SODIUM SERPL-SCNC: 140 MMOL/L (ref 128–145)
T4 FREE SERPL-MCNC: 0.77 NG/DL (ref 0.65–1.44)
TSH SERPL DL<=0.05 MIU/L-ACNC: 12.35 UIU/ML (ref 0.34–4.82)

## 2023-08-23 PROCEDURE — 80048 BASIC METABOLIC PNL TOTAL CA: CPT | Performed by: FAMILY MEDICINE

## 2023-08-23 PROCEDURE — 36415 COLL VENOUS BLD VENIPUNCTURE: CPT | Performed by: FAMILY MEDICINE

## 2023-08-23 PROCEDURE — 84439 ASSAY OF FREE THYROXINE: CPT | Performed by: FAMILY MEDICINE

## 2023-08-23 PROCEDURE — 84443 ASSAY THYROID STIM HORMONE: CPT | Performed by: FAMILY MEDICINE

## 2023-08-23 PROCEDURE — 85652 RBC SED RATE AUTOMATED: CPT | Performed by: FAMILY MEDICINE

## 2023-08-23 PROCEDURE — 99213 OFFICE O/P EST LOW 20 MIN: CPT | Performed by: FAMILY MEDICINE

## 2023-08-23 PROCEDURE — 86140 C-REACTIVE PROTEIN: CPT | Performed by: FAMILY MEDICINE

## 2023-08-23 RX ORDER — METFORMIN HYDROCHLORIDE 500 MG/1
1000 TABLET, EXTENDED RELEASE ORAL 2 TIMES DAILY
Qty: 360 TABLET | Refills: 0 | Status: SHIPPED | OUTPATIENT
Start: 2023-08-23 | End: 2023-11-22

## 2023-08-23 RX ORDER — EMPAGLIFLOZIN 25 MG/1
1 TABLET, FILM COATED ORAL DAILY
Qty: 90 TABLET | Refills: 0 | Status: SHIPPED | OUTPATIENT
Start: 2023-08-23 | End: 2023-11-22

## 2023-08-23 ASSESSMENT — ENCOUNTER SYMPTOMS
DIZZINESS: 0
BRUISES/BLEEDS EASILY: 0
DIARRHEA: 0
SHORTNESS OF BREATH: 0
ARTHRALGIAS: 0
NUMBNESS: 0
HEADACHES: 0
FATIGUE: 0
WEAKNESS: 1
ABDOMINAL PAIN: 0
UNEXPECTED WEIGHT CHANGE: 0
PALPITATIONS: 0
BACK PAIN: 0
DYSPHORIC MOOD: 0
LIGHT-HEADEDNESS: 0
FEVER: 0
NAUSEA: 0
SLEEP DISTURBANCE: 0
CHILLS: 0
MYALGIAS: 1

## 2023-08-23 NOTE — PROGRESS NOTES
Subjective      Abhilash Huggins is a 78 y.o. male who presents for medication review.    He would like to go through his chronic medications to see if there is any of them he may get rid of.  Also to review their indications.  Unfortunately today he did cancel his rheumatology appointment as him and his wife had had some intermittent stomach flu symptoms.  He was not sure why this appointment was made.      He had seen me on 2 different occasions work-up completed both positive for high inflammatory markers responded to steroid burst and symptoms of extremity weakness and pain and stiffness suspected PMR.        HPI    The following have been reviewed and updated as appropriate in this visit:          Allergies   Allergen Reactions    Amoxicillin      HIVES, NAUSEA    Penicillins      HIVES, NAUSEA     Current Outpatient Medications   Medication Sig Dispense Refill    metFORMIN XR (GLUCOPHAGE-XR) 500 mg 24 hr tablet Take 2 tablets (1,000 mg total) by mouth 2 (two) times a day 360 tablet 0    empagliflozin (Jardiance) 25 mg tablet Take 1 tablet (25 mg total) by mouth daily 90 tablet 0    tamsulosin (FLOMAX) 0.4 mg capsule TAKE TWO CAPSULES BY MOUTH EVERY DAY with dinner 180 capsule 0    lisinopriL (PRINIVIL,ZESTRIL) 10 mg tablet TAKE ONE TABLET BY MOUTH EVERY DAY 30 tablet 0    metoprolol tartrate (LOPRESSOR) 25 mg tablet Take 1 tablet (25 mg total) by mouth 2 (two) times a day 180 tablet 0    levothyroxine (SYNTHROID, LEVOTHROID) 50 mcg tablet Take 1 tablet (50 mcg total) by mouth daily 60 tablet 0    amitriptyline (ELAVIL) 10 mg tablet Take 1 tablet (10 mg total) by mouth nightly as needed for sleep 30 tablet 1    lansoprazole (Prevacid) 15 mg capsule Take 1 capsule (15 mg total) by mouth daily 30 capsule 2    finasteride (PROSCAR) 5 mg tablet Take 1 tablet (5 mg total) by mouth daily 90 tablet 3    atorvastatin (LIPITOR) 80 mg tablet TAKE ONE TABLET BY MOUTH EVERY DAY 90 tablet 3    clopidogreL (PLAVIX) 75 mg tablet  TAKE ONE TABLET BY MOUTH EVERY DAY 90 tablet 3    Rhopressa 0.02 % drops SMARTSI Drop(s) Left Eye Every Evening       No current facility-administered medications for this visit.     Past Medical History:   Diagnosis Date    Diabetes mellitus (CMS/HCC)     Hypertension      Past Surgical History:   Procedure Laterality Date    CARDIAC SURGERY  2016    Cardiac bypass X4 vessels    CARDIAC SURGERY  1997    2 stents    CATARACT EXTRACTION Left     CHOLECYSTECTOMY  2007    COLONOSCOPY      Approx 8 years ago with Dr Mcguire- was to have 5 year follow up    COLONOSCOPY  2015    Polyps     Family History   Problem Relation Age of Onset    Heart disease Mother     Heart attack Father         Myocardial infarction    Diabetes type II Brother         Toes removed due to diabetes     Social History     Socioeconomic History    Marital status:    Tobacco Use    Smoking status: Former     Packs/day: 0.00     Years: 20.00     Additional pack years: 0.00     Total pack years: 0.00     Types: Cigarettes    Smokeless tobacco: Never   Vaping Use    Vaping Use: Never used   Substance and Sexual Activity    Alcohol use: Yes     Comment: Occasionally    Drug use: No    Sexual activity: Defer     Social Determinants of Health     Tobacco Use: Medium Risk (2023)    Patient History     Smoking Tobacco Use: Former     Smokeless Tobacco Use: Never       Review of Systems   Constitutional:  Negative for chills, fatigue, fever and unexpected weight change.   Respiratory:  Negative for shortness of breath.    Cardiovascular:  Negative for chest pain, palpitations and leg swelling.   Gastrointestinal:  Negative for abdominal pain, diarrhea and nausea.   Musculoskeletal:  Positive for myalgias. Negative for arthralgias and back pain.   Skin:  Negative for rash.   Neurological:  Positive for weakness. Negative for dizziness, light-headedness, numbness and headaches.   Hematological:  Does not bruise/bleed  easily.   Psychiatric/Behavioral:  Negative for dysphoric mood and sleep disturbance.      As noted in HPI    Objective   /78   Pulse 74   Temp 36.7 °C (98 °F)   Resp 18   Wt 101.6 kg (224 lb)   SpO2 93%   BMI 30.38 kg/m²     Physical Exam  Constitutional:       General: He is not in acute distress.     Appearance: Normal appearance. He is not ill-appearing.   HENT:      Right Ear: Tympanic membrane normal.      Left Ear: Tympanic membrane normal.   Cardiovascular:      Rate and Rhythm: Normal rate and regular rhythm.      Pulses: Normal pulses.      Heart sounds: Normal heart sounds.   Pulmonary:      Effort: Pulmonary effort is normal. No respiratory distress.      Breath sounds: Normal breath sounds. No wheezing, rhonchi or rales.   Musculoskeletal:         General: No swelling or tenderness.      Cervical back: No muscular tenderness.      Right lower leg: No edema.      Left lower leg: No edema.   Skin:     General: Skin is warm and dry.      Capillary Refill: Capillary refill takes less than 2 seconds.      Findings: No rash.   Neurological:      General: No focal deficit present.      Mental Status: He is alert and oriented to person, place, and time. Mental status is at baseline.      Sensory: No sensory deficit.      Motor: No weakness.      Gait: Gait normal.   Psychiatric:         Mood and Affect: Mood normal.         Behavior: Behavior normal.         Thought Content: Thought content normal.         Judgment: Judgment normal.         ASSESSMENT AND PLAN   Diagnoses and all orders for this visit:    Elevated erythrocyte sedimentation rate  -     Sedimentation rate, automated Blood, Venous  -     C-reactive protein (Inflammation) Blood, Venous    Elevated serum creatinine  -     Basic metabolic panel Blood, Venous    Elevated TSH  -     Thyroid Stimulating Hormone, Ultrasensitive Blood, Venous    Hypothyroidism, unspecified type  -     Thyroid Stimulating Hormone, Ultrasensitive Blood,  Venous    Type 2 diabetes mellitus without complication, without long-term current use of insulin (CMS/[facility])  -     metFORMIN XR (GLUCOPHAGE-XR) 500 mg 24 hr tablet; Take 2 tablets (1,000 mg total) by mouth 2 (two) times a day  -     empagliflozin (Jardiance) 25 mg tablet; Take 1 tablet (25 mg total) by mouth daily  -     Hemoglobin A1c (glycosylated) Blood, Venous; Future      Trending inflammatory markers today encouraged him especially with symptoms recurring to schedule with rheumatology.  Ongoing suspicion of PMR.  Compared to June his inflammatory markers are now normal during both mild bouts of stiffness and muscle weakness his markers have been high.    Kidney function is now in the normal range.    Rechecking TSH today    Reviewed all of his medications including their indications.      Continue regular follow-up in 6 months sooner if needed.    Karli Brito MD      A voice recognition program may have been used to aid in documentation. Words are not always transcribed exactly as spoken. Errors may be present despite efforts to correct and should be taken within the context of the discussion. Please contact the provider if you need assistance interpreting this documentation

## 2023-08-24 DIAGNOSIS — R79.89 ELEVATED TSH: Primary | ICD-10-CM

## 2023-08-24 RX ORDER — LEVOTHYROXINE SODIUM 75 UG/1
75 TABLET ORAL DAILY
Qty: 90 TABLET | Refills: 0 | Status: SHIPPED | OUTPATIENT
Start: 2023-08-24 | End: 2024-04-12 | Stop reason: DRUGHIGH

## 2023-08-25 DIAGNOSIS — I10 ESSENTIAL HYPERTENSION: ICD-10-CM

## 2023-08-25 NOTE — TELEPHONE ENCOUNTER
Care Due:                  Date            Visit Type   Department     Provider  --------------------------------------------------------------------------------                              DIABETIC     [facility]  Last Visit: 08-      FOLLOW UP    MEDICINE       CAROL JENKINS  Next Visit: None Scheduled  None         None Found                                                            Last  Test          Frequency    Reason                     Performed    Due Date  --------------------------------------------------------------------------------  Lipid Panel.  12 months..  atorvastatin.............  Not Found    Overdue    [employer] Embedded Care Due Messages. Reference number: [id number]. 8/25/2023 5:41:36 PM RYAN

## 2023-08-28 RX ORDER — LISINOPRIL 10 MG/1
10 TABLET ORAL DAILY
Qty: 90 TABLET | Refills: 1 | Status: SHIPPED | OUTPATIENT
Start: 2023-08-28 | End: 2024-03-19 | Stop reason: SDUPTHER

## 2023-09-18 DIAGNOSIS — I10 ESSENTIAL HYPERTENSION: ICD-10-CM

## 2023-09-18 RX ORDER — METOPROLOL TARTRATE 25 MG/1
25 TABLET, FILM COATED ORAL 2 TIMES DAILY
Qty: 180 TABLET | Refills: 0 | Status: SHIPPED | OUTPATIENT
Start: 2023-09-18 | End: 2023-12-22

## 2023-09-18 NOTE — TELEPHONE ENCOUNTER
No care due was identified.  [facility] Embedded Care Due Messages. Reference number: [id number]. 9/18/2023 1:38:49 PM MDT

## 2023-09-19 DIAGNOSIS — G47.00 INSOMNIA, UNSPECIFIED TYPE: ICD-10-CM

## 2023-09-19 RX ORDER — AMITRIPTYLINE HYDROCHLORIDE 10 MG/1
10 TABLET, FILM COATED ORAL NIGHTLY PRN
Qty: 30 TABLET | Refills: 1 | Status: SHIPPED | OUTPATIENT
Start: 2023-09-19 | End: 2024-03-19 | Stop reason: SDUPTHER

## 2023-09-19 NOTE — TELEPHONE ENCOUNTER
No care due was identified.  [facility] Embedded Care Due Messages. Reference number: [id number]. 9/19/2023 8:01:17 AM RYAN

## 2023-10-03 DIAGNOSIS — E11.9 TYPE 2 DIABETES MELLITUS WITHOUT COMPLICATION, WITHOUT LONG-TERM CURRENT USE OF INSULIN (CMS/HCC): Primary | ICD-10-CM

## 2023-10-09 ENCOUNTER — CONSULT (OUTPATIENT)
Dept: ENDOCRINOLOGY | Facility: CLINIC | Age: 78
End: 2023-10-09
Payer: MEDICARE

## 2023-10-09 ENCOUNTER — LAB (OUTPATIENT)
Dept: LAB | Facility: CLINIC | Age: 78
End: 2023-10-09
Payer: MEDICARE

## 2023-10-09 VITALS
OXYGEN SATURATION: 93 % | WEIGHT: 226 LBS | DIASTOLIC BLOOD PRESSURE: 72 MMHG | HEART RATE: 73 BPM | RESPIRATION RATE: 16 BRPM | BODY MASS INDEX: 30.65 KG/M2 | SYSTOLIC BLOOD PRESSURE: 158 MMHG

## 2023-10-09 DIAGNOSIS — E03.9 HYPOTHYROIDISM, UNSPECIFIED TYPE: ICD-10-CM

## 2023-10-09 DIAGNOSIS — E11.9 TYPE 2 DIABETES MELLITUS WITHOUT COMPLICATION, WITHOUT LONG-TERM CURRENT USE OF INSULIN (CMS/HCC): ICD-10-CM

## 2023-10-09 DIAGNOSIS — R79.89 ELEVATED TSH: ICD-10-CM

## 2023-10-09 LAB
ALBUMIN SERPL-MCNC: 4.1 G/DL (ref 3.3–5.5)
ALP SERPL-CCNC: 51 U/L (ref 45–115)
ALT SERPL-CCNC: 45 U/L (ref 10–47)
ANION GAP SERPL CALC-SCNC: 8 MMOL/L (ref 3–11)
AST SERPL-CCNC: 40 U/L
BILIRUB SERPL-MCNC: 1.1 MG/DL (ref 0.2–1.4)
BUN SERPL-MCNC: 17 MG/DL (ref 7–25)
CALCIUM ALBUM COR SERPL-MCNC: 9.6 MG/DL (ref 8.6–10.3)
CALCIUM SERPL-MCNC: 9.7 MG/DL (ref 8.6–10.3)
CHLORIDE SERPL-SCNC: 102 MMOL/L (ref 98–107)
CO2 SERPL-SCNC: 29 MMOL/L (ref 21–32)
CREAT SERPL-MCNC: 1 MG/DL (ref 0.7–1.3)
EGFRCR SERPLBLD CKD-EPI 2021: 77 ML/MIN/1.73M*2
EST. AVERAGE GLUCOSE BLD GHB EST-MCNC: 174.3 MG/DL
GLUCOSE SERPL-MCNC: 172 MG/DL (ref 70–105)
HBA1C MFR BLD: 7.7 % (ref 4.8–6)
POTASSIUM SERPL-SCNC: 4.8 MMOL/L (ref 3.5–5.1)
PROT SERPL-MCNC: 7.1 G/DL (ref 6.4–8.1)
SODIUM SERPL-SCNC: 139 MMOL/L (ref 128–145)
TSH SERPL DL<=0.05 MIU/L-ACNC: 9.79 UIU/ML (ref 0.34–4.82)

## 2023-10-09 PROCEDURE — 84443 ASSAY THYROID STIM HORMONE: CPT | Performed by: INTERNAL MEDICINE

## 2023-10-09 PROCEDURE — 80053 COMPREHEN METABOLIC PANEL: CPT | Performed by: INTERNAL MEDICINE

## 2023-10-09 PROCEDURE — 36415 COLL VENOUS BLD VENIPUNCTURE: CPT | Performed by: INTERNAL MEDICINE

## 2023-10-09 PROCEDURE — 99214 OFFICE O/P EST MOD 30 MIN: CPT | Performed by: INTERNAL MEDICINE

## 2023-10-09 PROCEDURE — 83036 HEMOGLOBIN GLYCOSYLATED A1C: CPT | Performed by: FAMILY MEDICINE

## 2023-10-09 RX ORDER — PIOGLITAZONEHYDROCHLORIDE 15 MG/1
15 TABLET ORAL DAILY
Qty: 90 TABLET | Refills: 1 | Status: SHIPPED | OUTPATIENT
Start: 2023-10-09 | End: 2024-04-11

## 2023-10-13 DIAGNOSIS — I25.118 CORONARY ARTERY DISEASE OF NATIVE ARTERY OF NATIVE HEART WITH STABLE ANGINA PECTORIS (CMS/HCC): ICD-10-CM

## 2023-10-13 RX ORDER — CLOPIDOGREL BISULFATE 75 MG/1
TABLET ORAL
Qty: 90 TABLET | Refills: 3 | Status: SHIPPED | OUTPATIENT
Start: 2023-10-13 | End: 2024-10-31

## 2023-10-13 NOTE — TELEPHONE ENCOUNTER
No care due was identified.  [facility] Embedded Care Due Messages. Reference number: [id number]. 10/13/2023 8:02:54 AM RYAN

## 2023-10-15 ENCOUNTER — HEALTH MAINTENANCE LETTER (OUTPATIENT)
Age: 78
End: 2023-10-15

## 2023-11-01 DIAGNOSIS — N40.1 BENIGN PROSTATIC HYPERPLASIA WITH URINARY FREQUENCY: ICD-10-CM

## 2023-11-01 DIAGNOSIS — R35.0 BENIGN PROSTATIC HYPERPLASIA WITH URINARY FREQUENCY: ICD-10-CM

## 2023-11-01 RX ORDER — TAMSULOSIN HYDROCHLORIDE 0.4 MG/1
0.8 CAPSULE ORAL
Qty: 180 CAPSULE | Refills: 3 | Status: SHIPPED | OUTPATIENT
Start: 2023-11-01 | End: 2024-10-31

## 2023-11-01 NOTE — TELEPHONE ENCOUNTER
Filled tamsulosin per 12 month protocol and last appt with PCP in Aug 2023.  Previous refill of 90 day or less. Per chart review, no med plan indicated for tamsulosin

## 2023-11-01 NOTE — TELEPHONE ENCOUNTER
Care Due:                  Date            Visit Type   Department     Provider  --------------------------------------------------------------------------------                              DIABETIC     [facility]  Last Visit: 08-      FOLLOW UP    MEDICINE       CAROL JENKINS  Next Visit: None Scheduled  None         None Found                                                            Last  Test          Frequency    Reason                     Performed    Due Date  --------------------------------------------------------------------------------  Lipid Panel.  12 months..  atorvastatin.............  Not Found    Overdue    [employer] Embedded Care Due Messages. Reference number: [id number]. 11/01/2023 12:21:27 PM RYAN

## 2023-11-21 DIAGNOSIS — E11.9 TYPE 2 DIABETES MELLITUS WITHOUT COMPLICATION, WITHOUT LONG-TERM CURRENT USE OF INSULIN (CMS/HCC): ICD-10-CM

## 2023-11-21 NOTE — TELEPHONE ENCOUNTER
Care Due:                  Date            Visit Type   Department     Provider  --------------------------------------------------------------------------------                              DIABETIC     [facility]  Last Visit: 08-      FOLLOW UP    MEDICINE       CAROL JENKINS  Next Visit: None Scheduled  None         None Found                                                            Last  Test          Frequency    Reason                     Performed    Due Date  --------------------------------------------------------------------------------  CBC.........  6 months...  clopidogreL..............  Not Found    Overdue    [employer] Embedded Care Due Messages. Reference number: [id number]. 11/21/2023 4:13:07 PM MST

## 2023-11-22 RX ORDER — METFORMIN HYDROCHLORIDE 500 MG/1
1000 TABLET, EXTENDED RELEASE ORAL 2 TIMES DAILY
Qty: 360 TABLET | Refills: 0 | Status: SHIPPED | OUTPATIENT
Start: 2023-11-22 | End: 2024-03-19 | Stop reason: SDUPTHER

## 2023-11-22 RX ORDER — EMPAGLIFLOZIN 25 MG/1
1 TABLET, FILM COATED ORAL DAILY
Qty: 90 TABLET | Refills: 0 | Status: SHIPPED | OUTPATIENT
Start: 2023-11-22 | End: 2024-03-19 | Stop reason: SDUPTHER

## 2023-12-22 DIAGNOSIS — I10 ESSENTIAL HYPERTENSION: ICD-10-CM

## 2023-12-22 RX ORDER — METOPROLOL TARTRATE 25 MG/1
25 TABLET, FILM COATED ORAL 2 TIMES DAILY
Qty: 180 TABLET | Refills: 0 | Status: SHIPPED | OUTPATIENT
Start: 2023-12-22 | End: 2024-03-19 | Stop reason: SDUPTHER

## 2023-12-22 NOTE — TELEPHONE ENCOUNTER
No care due was identified.  [facility] Embedded Care Due Messages. Reference number: [id number]. 12/22/2023 12:45:19 PM MST

## 2023-12-22 NOTE — TELEPHONE ENCOUNTER
Medication refill request:  Medication(s):  metoprolol not filled due to Previous refill visit indicating specific care plan, please review for refill

## 2024-03-03 ENCOUNTER — HEALTH MAINTENANCE LETTER (OUTPATIENT)
Age: 79
End: 2024-03-03

## 2024-03-19 DIAGNOSIS — E11.9 TYPE 2 DIABETES MELLITUS WITHOUT COMPLICATION, WITHOUT LONG-TERM CURRENT USE OF INSULIN (CMS/HCC): ICD-10-CM

## 2024-03-19 DIAGNOSIS — G47.00 INSOMNIA, UNSPECIFIED TYPE: ICD-10-CM

## 2024-03-19 DIAGNOSIS — I10 ESSENTIAL HYPERTENSION: ICD-10-CM

## 2024-03-19 RX ORDER — EMPAGLIFLOZIN 25 MG/1
1 TABLET, FILM COATED ORAL DAILY
Qty: 90 TABLET | Refills: 0 | Status: SHIPPED | OUTPATIENT
Start: 2024-03-19 | End: 2024-06-24

## 2024-03-19 RX ORDER — METFORMIN HYDROCHLORIDE 500 MG/1
1000 TABLET, EXTENDED RELEASE ORAL 2 TIMES DAILY
Qty: 360 TABLET | Refills: 0 | Status: SHIPPED | OUTPATIENT
Start: 2024-03-19 | End: 2024-04-08 | Stop reason: ALTCHOICE

## 2024-03-19 RX ORDER — LISINOPRIL 10 MG/1
10 TABLET ORAL DAILY
Qty: 90 TABLET | Refills: 1 | Status: SHIPPED | OUTPATIENT
Start: 2024-03-19 | End: 2024-09-13

## 2024-03-19 RX ORDER — AMITRIPTYLINE HYDROCHLORIDE 10 MG/1
10 TABLET, FILM COATED ORAL NIGHTLY PRN
Qty: 90 TABLET | Refills: 1 | Status: SHIPPED | OUTPATIENT
Start: 2024-03-19 | End: 2024-10-02

## 2024-03-19 RX ORDER — METOPROLOL TARTRATE 25 MG/1
25 TABLET, FILM COATED ORAL 2 TIMES DAILY
Qty: 180 TABLET | Refills: 1 | Status: SHIPPED | OUTPATIENT
Start: 2024-03-19 | End: 2024-10-16

## 2024-03-19 NOTE — PROGRESS NOTES
Pt presents needing refills of medications. Will get labs and follow up with Dr Mendoza in April as scheduled.

## 2024-04-08 ENCOUNTER — OFFICE VISIT (OUTPATIENT)
Dept: ENDOCRINOLOGY | Facility: CLINIC | Age: 79
End: 2024-04-08
Payer: MEDICARE

## 2024-04-08 ENCOUNTER — LAB (OUTPATIENT)
Dept: LAB | Facility: CLINIC | Age: 79
End: 2024-04-08
Payer: MEDICARE

## 2024-04-08 VITALS
WEIGHT: 227 LBS | HEART RATE: 74 BPM | OXYGEN SATURATION: 94 % | TEMPERATURE: 98.6 F | BODY MASS INDEX: 30.79 KG/M2 | DIASTOLIC BLOOD PRESSURE: 82 MMHG | SYSTOLIC BLOOD PRESSURE: 122 MMHG

## 2024-04-08 DIAGNOSIS — E11.9 TYPE 2 DIABETES MELLITUS WITHOUT COMPLICATION, WITHOUT LONG-TERM CURRENT USE OF INSULIN (CMS/HCC): ICD-10-CM

## 2024-04-08 DIAGNOSIS — I10 ESSENTIAL HYPERTENSION: ICD-10-CM

## 2024-04-08 DIAGNOSIS — H00.011 HORDEOLUM EXTERNUM OF RIGHT UPPER EYELID: Primary | ICD-10-CM

## 2024-04-08 DIAGNOSIS — R79.89 ELEVATED TSH: ICD-10-CM

## 2024-04-08 DIAGNOSIS — E03.9 HYPOTHYROIDISM, UNSPECIFIED TYPE: ICD-10-CM

## 2024-04-08 LAB
ALBUMIN SERPL-MCNC: 4 G/DL (ref 3.3–5.5)
ALP SERPL-CCNC: 52 U/L (ref 45–115)
ALT SERPL-CCNC: 42 U/L (ref 10–47)
ANION GAP SERPL CALC-SCNC: 7 MMOL/L (ref 3–11)
AST SERPL-CCNC: 34 U/L
BILIRUB SERPL-MCNC: 0.9 MG/DL (ref 0.2–1.4)
BUN SERPL-MCNC: 17 MG/DL (ref 7–25)
CALCIUM ALBUM COR SERPL-MCNC: 9.8 MG/DL (ref 8.6–10.3)
CALCIUM SERPL-MCNC: 9.8 MG/DL (ref 8.6–10.3)
CHLORIDE SERPL-SCNC: 105 MMOL/L (ref 98–107)
CO2 SERPL-SCNC: 27 MMOL/L (ref 21–32)
CREAT SERPL-MCNC: 1.1 MG/DL (ref 0.7–1.3)
EGFRCR SERPLBLD CKD-EPI 2021: 68 ML/MIN/1.73M*2
EST. AVERAGE GLUCOSE BLD GHB EST-MCNC: 171.4 MG/DL
GLUCOSE SERPL-MCNC: 129 MG/DL (ref 70–105)
HBA1C MFR BLD: 7.6 % (ref 4.8–6)
POTASSIUM SERPL-SCNC: 4.8 MMOL/L (ref 3.5–5.1)
PROT SERPL-MCNC: 7.8 G/DL (ref 6.4–8.1)
SODIUM SERPL-SCNC: 139 MMOL/L (ref 128–145)
TSH SERPL DL<=0.05 MIU/L-ACNC: 7.84 UIU/ML (ref 0.34–4.82)

## 2024-04-08 PROCEDURE — 80053 COMPREHEN METABOLIC PANEL: CPT | Performed by: FAMILY MEDICINE

## 2024-04-08 PROCEDURE — 99214 OFFICE O/P EST MOD 30 MIN: CPT | Performed by: INTERNAL MEDICINE

## 2024-04-08 PROCEDURE — 83036 HEMOGLOBIN GLYCOSYLATED A1C: CPT | Performed by: INTERNAL MEDICINE

## 2024-04-08 PROCEDURE — 36415 COLL VENOUS BLD VENIPUNCTURE: CPT | Performed by: INTERNAL MEDICINE

## 2024-04-08 PROCEDURE — 84443 ASSAY THYROID STIM HORMONE: CPT | Performed by: INTERNAL MEDICINE

## 2024-04-08 PROCEDURE — G2211 COMPLEX E/M VISIT ADD ON: HCPCS | Performed by: INTERNAL MEDICINE

## 2024-04-08 RX ORDER — LATANOPROST 50 UG/ML
1 SOLUTION/ DROPS OPHTHALMIC NIGHTLY
COMMUNITY
Start: 2024-04-03

## 2024-04-08 RX ORDER — BACITRACIN ZINC AND POLYMYXIN B SULFATE 500; 10000 [USP'U]/G; [USP'U]/G
1 OINTMENT OPHTHALMIC 2 TIMES DAILY
Qty: 3.5 G | Refills: 0 | Status: SHIPPED | OUTPATIENT
Start: 2024-04-08 | End: 2024-10-02 | Stop reason: ALTCHOICE

## 2024-04-08 NOTE — PROGRESS NOTES
Reason for clinic visit: Type 2 diabetes and hypothyroidism    History of present illness:  79-year-old male past medical history of type 2 diabetes.  Diabetes has been complicated by history of coronary artery disease status post bypass x4.  Most recent A1c 7.6%.  Currently on Jardiance 25 mg daily.  And pioglitazone.  He is out of metformin.  He has felt much better off of metformin.  He is inquiring as to whether he can come off of this.    He does have a history of hypothyroidism.  Recent TSH was elevated for which we increased his levothyroxine.  Currently on 125 mcg daily.  Due for repeat levels today      Past Medical History:   Diagnosis Date    Diabetes mellitus (CMS/HCC)     Hypertension     Hypothyroidism     Type 2 diabetes mellitus (CMS/HCC)        Past Surgical History:   Procedure Laterality Date    CARDIAC SURGERY  01/04/2016    Cardiac bypass X4 vessels    CARDIAC SURGERY  01/01/1997    2 stents    CATARACT EXTRACTION Left     CHOLECYSTECTOMY  02/14/2007    COLONOSCOPY      Approx 8 years ago with Dr Mcguire- was to have 5 year follow up    COLONOSCOPY  01/01/2015    Polyps       Current Outpatient Medications on File Prior to Visit   Medication Sig Dispense Refill    latanoprost (XALATAN) 0.005 % ophthalmic solution Administer 1 drop into the left eye nightly      empagliflozin (Jardiance) 25 mg tablet Take 1 tablet (25 mg total) by mouth daily 90 tablet 0    metFORMIN XR (GLUCOPHAGE-XR) 500 mg 24 hr tablet Take 2 tablets (1,000 mg total) by mouth 2 (two) times a day 360 tablet 0    lisinopriL (PRINIVIL,ZESTRIL) 10 mg tablet Take 1 tablet (10 mg total) by mouth daily 90 tablet 1    amitriptyline (ELAVIL) 10 mg tablet Take 1 tablet (10 mg total) by mouth nightly as needed for sleep (Patient taking differently: Take 1 tablet (10 mg total) by mouth nightly as needed for sleep Takes rarely) 90 tablet 1    metoprolol tartrate (LOPRESSOR) 25 mg tablet Take 1 tablet (25 mg total) by mouth 2 (two) times  a day 180 tablet 1    tamsulosin (FLOMAX) 0.4 mg capsule Take 2 capsules (0.8 mg total) by mouth daily with dinner 180 capsule 3    clopidogreL (PLAVIX) 75 mg tablet TAKE ONE TABLET BY MOUTH EVERY DAY 90 tablet 3    levothyroxine (Synthroid) 125 mcg tablet Take 1 tablet (125 mcg total) by mouth daily 30 tablet 11    pioglitazone (ACTOS) 15 mg tablet Take 1 tablet (15 mg total) by mouth daily (Patient taking differently: Take 1 tablet (15 mg total) by mouth daily Patient is unsure if he is taking this) 90 tablet 1    levothyroxine (SYNTHROID, LEVOTHROID) 75 mcg tablet Take 1 tablet (75 mcg total) by mouth daily (Patient not taking: Reported on 4/8/2024) 90 tablet 0    levothyroxine (SYNTHROID, LEVOTHROID) 50 mcg tablet Take 1 tablet (50 mcg total) by mouth daily (Patient not taking: Reported on 10/9/2023) 60 tablet 0    atorvastatin (LIPITOR) 80 mg tablet TAKE ONE TABLET BY MOUTH EVERY DAY (Patient not taking: Reported on 4/8/2024) 90 tablet 3     No current facility-administered medications on file prior to visit.         Allergies   Allergen Reactions    Amoxicillin      HIVES, NAUSEA    Penicillins      HIVES, NAUSEA         Social History     Socioeconomic History    Marital status:      Spouse name: Not on file    Number of children: Not on file    Years of education: Not on file    Highest education level: Not on file   Occupational History    Not on file   Tobacco Use    Smoking status: Former     Packs/day: 0.00     Years: 20.00     Additional pack years: 0.00     Total pack years: 0.00     Types: Cigarettes    Smokeless tobacco: Never   Vaping Use    Vaping Use: Never used   Substance and Sexual Activity    Alcohol use: Yes     Comment: Occasionally    Drug use: No    Sexual activity: Defer   Other Topics Concern    Not on file   Social History Narrative    Not on file     Social Determinants of Health     Financial Resource Strain: Not on file   Food Insecurity: Not on file   Transportation Needs:  Not on file   Physical Activity: Not on file   Stress: Not on file   Social Connections: Not on file   Intimate Partner Violence: Not on file   Housing Stability: Not on file         Family History   Problem Relation Age of Onset    Heart disease Mother     Heart attack Father         Myocardial infarction    Diabetes type II Brother         Toes removed due to diabetes       Comprehensive 14 point review of systems is otherwise unremarkable except what has been mentioned in the history of present illness      /82   Pulse 74   Temp 37 °C (98.6 °F)   Wt 103 kg (227 lb)   SpO2 94%   BMI 30.79 kg/m²   General appearance: in no acute distress  Eyes: No stare, proptosis. Conjunctiva clear.  ENT: No external lesions. Hearing intact to the spoken word.  Lungs:   Respirations not labored  Heart: Regular in rate   Neurologic:No focal deficit   Psychiatric: alert, appropriate, well dressed and groomed        Lab Results   Component Value Date    HGBA1C 7.6 (H) 04/08/2024       Lab Results   Component Value Date    LDLCALC <20 (L) 07/14/2020         Lab Results   Component Value Date    MICROALBUR 15.6 06/08/2022         Lab Results   Component Value Date    TSH 9.793 (H) 10/09/2023       Lab Results   Component Value Date    GLUCOSE 129 (H) 04/08/2024    CALCIUM 9.8 04/08/2024     04/08/2024    K 4.8 04/08/2024    CO2 27 04/08/2024     04/08/2024    BUN 17 04/08/2024    CREATININE 1.10 04/08/2024    ANIONGAP 7 04/08/2024     Impression:  1.  Type 2 diabetes.  Currently uncontrolled.  Will hold metformin.  Repeat A1c in 3 months.    2.  Hypothyroidism.  Repeat TSH today pending.  Await results     3.  Most recent LDL cholesterol has been at goal    4.  Blood pressure at goal.    5.  Having bilateral hamstring pain.  Possibly related the low back issue.  Discussed potentially sending him to sports medicine.  He will think about it.    6.  Hordeolum externum of right upper eyelid.  Discussed treatment  supportive as well as prescribing topical bacitracin.      Plan:  1.  Topical bacitracin as above  2.  Consider sports medicine referral  3.  Hold metformin  4.  Await TSH  5.  Repeat A1c in 3 months  6.  Follow-up in clinic in 6 months    Fahad Mendoza DO

## 2024-04-11 DIAGNOSIS — E11.9 TYPE 2 DIABETES MELLITUS WITHOUT COMPLICATION, WITHOUT LONG-TERM CURRENT USE OF INSULIN (CMS/HCC): ICD-10-CM

## 2024-04-11 RX ORDER — PIOGLITAZONEHYDROCHLORIDE 15 MG/1
15 TABLET ORAL DAILY
Qty: 90 TABLET | Refills: 3 | Status: SHIPPED | OUTPATIENT
Start: 2024-04-11

## 2024-04-12 ENCOUNTER — TELEPHONE (OUTPATIENT)
Dept: INTERNAL MEDICINE | Facility: CLINIC | Age: 79
End: 2024-04-12
Payer: MEDICARE

## 2024-04-12 DIAGNOSIS — E11.9 TYPE 2 DIABETES MELLITUS WITHOUT COMPLICATION, WITHOUT LONG-TERM CURRENT USE OF INSULIN (CMS/HCC): Primary | ICD-10-CM

## 2024-04-12 RX ORDER — LEVOTHYROXINE SODIUM 150 UG/1
150 TABLET ORAL DAILY
Qty: 90 TABLET | Refills: 2 | Status: SHIPPED | OUTPATIENT
Start: 2024-04-12 | End: 2024-07-12 | Stop reason: ALTCHOICE

## 2024-04-12 NOTE — TELEPHONE ENCOUNTER
----- Message from Fahad Mendoza DO sent at 4/9/2024  5:40 AM MDT -----  TSH is mildly elevated at 7.839    A1c as we discussed yesterday 7.6    Currently on 125 mcg of thyroid replacement.  I would advise that we increase this to 150    Lets plan on repeating A1c and TSH again in 3 months.  Recommendations to follow then    Otherwise follow-up in clinic as scheduled      Current Outpatient Medications on File Prior to Visit:  latanoprost (XALATAN) 0.005 % ophthalmic solution, Administer 1 drop into the left eye nightly, Disp: , Rfl:   bacitracin-polymyxin B (POLYSPORIN) ophthalmic ointment, Apply 1 Application to right eye 2 (two) times a day, Disp: 3.5 g, Rfl: 0  empagliflozin (Jardiance) 25 mg tablet, Take 1 tablet (25 mg total) by mouth daily, Disp: 90 tablet, Rfl: 0  lisinopriL (PRINIVIL,ZESTRIL) 10 mg tablet, Take 1 tablet (10 mg total) by mouth daily, Disp: 90 tablet, Rfl: 1  amitriptyline (ELAVIL) 10 mg tablet, Take 1 tablet (10 mg total) by mouth nightly as needed for sleep (Patient taking differently: Take 1 tablet (10 mg total) by mouth nightly as needed for sleep Takes rarely), Disp: 90 tablet, Rfl: 1  metoprolol tartrate (LOPRESSOR) 25 mg tablet, Take 1 tablet (25 mg total) by mouth 2 (two) times a day, Disp: 180 tablet, Rfl: 1  tamsulosin (FLOMAX) 0.4 mg capsule, Take 2 capsules (0.8 mg total) by mouth daily with dinner, Disp: 180 capsule, Rfl: 3  clopidogreL (PLAVIX) 75 mg tablet, TAKE ONE TABLET BY MOUTH EVERY DAY, Disp: 90 tablet, Rfl: 3  levothyroxine (Synthroid) 125 mcg tablet, Take 1 tablet (125 mcg total) by mouth daily, Disp: 30 tablet, Rfl: 11  pioglitazone (ACTOS) 15 mg tablet, Take 1 tablet (15 mg total) by mouth daily (Patient taking differently: Take 1 tablet (15 mg total) by mouth daily Patient is unsure if he is taking this), Disp: 90 tablet, Rfl: 1  levothyroxine (SYNTHROID, LEVOTHROID) 75 mcg tablet, Take 1 tablet (75 mcg total) by mouth daily (Patient not taking: Reported on 4/8/2024),  Disp: 90 tablet, Rfl: 0  levothyroxine (SYNTHROID, LEVOTHROID) 50 mcg tablet, Take 1 tablet (50 mcg total) by mouth daily (Patient not taking: Reported on 10/9/2023), Disp: 60 tablet, Rfl: 0  atorvastatin (LIPITOR) 80 mg tablet, TAKE ONE TABLET BY MOUTH EVERY DAY (Patient not taking: Reported on 4/8/2024), Disp: 90 tablet, Rfl: 3    No current facility-administered medications on file prior to visit.

## 2024-06-20 DIAGNOSIS — E11.9 TYPE 2 DIABETES MELLITUS WITHOUT COMPLICATION, WITHOUT LONG-TERM CURRENT USE OF INSULIN (CMS/HCC): ICD-10-CM

## 2024-06-20 NOTE — TELEPHONE ENCOUNTER
Care Due:                  Date            Visit Type   Department     Provider  --------------------------------------------------------------------------------                              DIABETIC     [facility]  Last Visit: 08-      FOLLOW UP    MEDICINE       CAROL JENKINS  Next Visit: None Scheduled  None         None Found                                                            Last  Test          Frequency    Reason                     Performed    Due Date  --------------------------------------------------------------------------------  Office Visit  6 months...  amitriptyline,             08- 02-                             clopidogreL,                             empagliflozin............    CBC.........  6 months...  clopidogreL..............  Not Found    Overdue    Lipid Panel.  12 months..  atorvastatin.............  Not Found    Overdue    [employer] Embedded Care Due Messages. Reference number: [id number]. 6/20/2024 8:31:24 AM RYAN

## 2024-06-21 NOTE — TELEPHONE ENCOUNTER
Medication refill request:  Medication(s):  jardiance not filled due to Manual Review message review, no valid encounter in the last 6 months - per medication protocol

## 2024-06-24 RX ORDER — EMPAGLIFLOZIN 25 MG/1
1 TABLET, FILM COATED ORAL DAILY
Qty: 90 TABLET | Refills: 0 | Status: SHIPPED | OUTPATIENT
Start: 2024-06-24 | End: 2024-10-08

## 2024-07-08 ENCOUNTER — LAB (OUTPATIENT)
Dept: LAB | Facility: CLINIC | Age: 79
End: 2024-07-08
Payer: MEDICARE

## 2024-07-08 DIAGNOSIS — H00.011 HORDEOLUM EXTERNUM OF RIGHT UPPER EYELID: ICD-10-CM

## 2024-07-08 DIAGNOSIS — E11.9 TYPE 2 DIABETES MELLITUS WITHOUT COMPLICATION, WITHOUT LONG-TERM CURRENT USE OF INSULIN (CMS/HCC): ICD-10-CM

## 2024-07-08 LAB
EST. AVERAGE GLUCOSE BLD GHB EST-MCNC: 194.4 MG/DL
HBA1C MFR BLD: 8.4 % (ref 4.8–6)
TSH SERPL DL<=0.05 MIU/L-ACNC: 5.25 UIU/ML (ref 0.34–4.82)

## 2024-07-08 PROCEDURE — 36415 COLL VENOUS BLD VENIPUNCTURE: CPT | Mod: NCP | Performed by: INTERNAL MEDICINE

## 2024-07-08 PROCEDURE — 84443 ASSAY THYROID STIM HORMONE: CPT | Performed by: INTERNAL MEDICINE

## 2024-07-08 PROCEDURE — 83036 HEMOGLOBIN GLYCOSYLATED A1C: CPT | Performed by: INTERNAL MEDICINE

## 2024-07-12 DIAGNOSIS — E11.9 TYPE 2 DIABETES MELLITUS WITHOUT COMPLICATION, WITHOUT LONG-TERM CURRENT USE OF INSULIN (CMS/HCC): ICD-10-CM

## 2024-07-12 DIAGNOSIS — E03.9 HYPOTHYROIDISM, UNSPECIFIED TYPE: Primary | ICD-10-CM

## 2024-07-12 RX ORDER — LEVOTHYROXINE SODIUM 175 UG/1
175 TABLET ORAL DAILY
Qty: 90 TABLET | Refills: 1 | Status: SHIPPED | OUTPATIENT
Start: 2024-07-12

## 2024-09-13 DIAGNOSIS — I10 ESSENTIAL HYPERTENSION: ICD-10-CM

## 2024-09-13 RX ORDER — LISINOPRIL 10 MG/1
10 TABLET ORAL DAILY
Qty: 90 TABLET | Refills: 0 | Status: SHIPPED | OUTPATIENT
Start: 2024-09-13 | End: 2025-01-08

## 2024-09-13 NOTE — TELEPHONE ENCOUNTER
Medication refill request:  Call Center request for office visit   90-day Courtesy Refill given

## 2024-09-13 NOTE — TELEPHONE ENCOUNTER
Care Due:                  Date            Visit Type   Department     Provider  --------------------------------------------------------------------------------                              DIABETIC     [facility]  Last Visit: 08-      FOLLOW UP    MEDICINE       CAROL JENKINS  Next Visit: None Scheduled  None         None Found                                                            Last  Test          Frequency    Reason                     Performed    Due Date  --------------------------------------------------------------------------------  Office Visit  6 months...  Jardiance, amitriptyline,   08- 02-                             atorvastatin,                             clopidogreL, lisinopriL,                             metoprolol, tamsulosin...    ALT.........  6 months...  clopidogreL..............  04-   10-    CBC.........  6 months...  clopidogreL..............  Not Found    Overdue    Lipid Panel.  12 months..  atorvastatin.............  Not Found    Overdue    [employer] Embedded Care Due Messages. Reference number: [id number]. 9/13/2024 8:03:23 AM RYAN

## 2024-09-16 ENCOUNTER — TELEPHONE (OUTPATIENT)
Dept: FAMILY MEDICINE | Facility: CLINIC | Age: 79
End: 2024-09-16
Payer: MEDICARE

## 2024-09-16 NOTE — TELEPHONE ENCOUNTER
I called Abhilash & left a message to call back, Abhilash is due for his Yearly Medicare Physical to continue to get RX/Refill's a 90 day RX/Refill was sent in for his Lisinopril on 9/13/24.

## 2024-10-01 DIAGNOSIS — E11.9 TYPE 2 DIABETES MELLITUS WITHOUT COMPLICATION, WITHOUT LONG-TERM CURRENT USE OF INSULIN (CMS/HCC): Primary | ICD-10-CM

## 2024-10-02 ENCOUNTER — OFFICE VISIT (OUTPATIENT)
Dept: FAMILY MEDICINE | Facility: CLINIC | Age: 79
End: 2024-10-02
Payer: MEDICARE

## 2024-10-02 VITALS
DIASTOLIC BLOOD PRESSURE: 72 MMHG | TEMPERATURE: 98.1 F | RESPIRATION RATE: 18 BRPM | HEART RATE: 76 BPM | WEIGHT: 229 LBS | BODY MASS INDEX: 31.06 KG/M2 | SYSTOLIC BLOOD PRESSURE: 140 MMHG | OXYGEN SATURATION: 96 %

## 2024-10-02 DIAGNOSIS — H61.23 BILATERAL IMPACTED CERUMEN: ICD-10-CM

## 2024-10-02 DIAGNOSIS — E11.9 TYPE 2 DIABETES MELLITUS WITHOUT COMPLICATION, WITHOUT LONG-TERM CURRENT USE OF INSULIN (CMS/HCC): ICD-10-CM

## 2024-10-02 DIAGNOSIS — I10 ESSENTIAL HYPERTENSION: ICD-10-CM

## 2024-10-02 DIAGNOSIS — L73.9 FOLLICULITIS: ICD-10-CM

## 2024-10-02 DIAGNOSIS — Z00.00 MEDICARE ANNUAL WELLNESS VISIT, SUBSEQUENT: Primary | ICD-10-CM

## 2024-10-02 DIAGNOSIS — E78.2 MIXED HYPERLIPIDEMIA: ICD-10-CM

## 2024-10-02 PROCEDURE — G0439 PPPS, SUBSEQ VISIT: HCPCS | Performed by: FAMILY MEDICINE

## 2024-10-02 RX ORDER — METFORMIN HYDROCHLORIDE 1000 MG/1
1000 TABLET ORAL 2 TIMES DAILY WITH MEALS
Qty: 180 TABLET | Refills: 1 | Status: SHIPPED | OUTPATIENT
Start: 2024-10-02

## 2024-10-02 RX ORDER — CLINDAMYCIN PHOSPHATE 10 UG/ML
1 LOTION TOPICAL 2 TIMES DAILY
Qty: 60 ML | Refills: 1 | Status: SHIPPED | OUTPATIENT
Start: 2024-10-02

## 2024-10-02 RX ORDER — ASPIRIN 81 MG
5 TABLET, DELAYED RELEASE (ENTERIC COATED) ORAL 2 TIMES DAILY
Qty: 15 ML | Refills: 0 | Status: SHIPPED | OUTPATIENT
Start: 2024-10-02 | End: 2024-10-06

## 2024-10-02 RX ORDER — METFORMIN HYDROCHLORIDE 500 MG/1
1000 TABLET ORAL 2 TIMES DAILY WITH MEALS
COMMUNITY
End: 2024-10-02 | Stop reason: SDUPTHER

## 2024-10-02 ASSESSMENT — ENCOUNTER SYMPTOMS
FATIGUE: 0
CHILLS: 0
BRUISES/BLEEDS EASILY: 0
SLEEP DISTURBANCE: 0
PALPITATIONS: 0
ARTHRALGIAS: 0
SHORTNESS OF BREATH: 0
HEADACHES: 0
WEAKNESS: 0
LIGHT-HEADEDNESS: 0
UNEXPECTED WEIGHT CHANGE: 0
DYSPHORIC MOOD: 0
ACTIVITY CHANGE: 0
ABDOMINAL PAIN: 0
NAUSEA: 0
DIFFICULTY URINATING: 0
DYSURIA: 0
DIARRHEA: 0
DIZZINESS: 0
FEVER: 0
NUMBNESS: 0
FREQUENCY: 1
SORE THROAT: 0

## 2024-10-02 NOTE — PROGRESS NOTES
Subjective     Abhilash Huggins is a 79 y.o. male who presents for Subsequent Medicare Wellness Exam (S-MWE)    History of Present Illness    The patient, with a history of BPH, hypertension, GERD, hyperlipidemia, type 2 diabetes, coronary artery disease status post CABG, and diabetic neuropathy, presents for a Medicare wellness exam. He is currently on metformin 1000mg twice daily, lisinopril 10mg daily, levothyroxine 175mcg daily, Jardiance 25mg daily, Actos 15mg daily, metoprolol 25mg twice daily, Flomax for BPH, and aspirin 75mg daily for coronary artery disease.    The patient reports recurrent skin eruptions on the forehead, nose, and cheek. He has been using an  topical medication, which he reports was previously helpful.    He also reports urinary symptoms related to his BPH, but he is not interested in further evaluation or treatment at this time. He was previously seen by a urologist and was prescribed a medication that did not improve his symptoms.    The patient also reports occasional constipation, for which he takes Senna S and a fiber supplement. He expresses dissatisfaction with the effectiveness of his current regimen.        The patient is up to date on colon cancer screening and does not smoke. He declines all vaccinations.          Screenings  Medicare Health Status Self-Assessment  General Health  Have you been hospitalized since we last saw you?: No  Have you seen a health care provider outside our clinic?: No  In general, how would you say your health is?: Good  In general, how satisfied are you with your life?: Good  How would you describe the condition of your mouth and teeth?: Good  Do you go to the dentist regularly?: Yes  Do you feel tired during the daytime?: Yes  Do you have trouble staying or falling asleep?: Yes  Do you or friends or family have any concerns about memory?: No  Do you have any problems with your hearing?: No  Are you on opioids for pain?: No  Do you have an  addiction problem?: No  Do you have trouble managing your anger?: No    Safety and ADLs  Safety and ADLs  Do you always fasten your seat belt when you are in the car?: Yes  Do you know where to locate and properly use a first aid kit and fire extinguisher in case of an emergency?: Yes  Does your home have rugs in the walkways?: No  Does your home have grab bars in the bathroom?: Yes  Does your home have handrails on the stairs?: Yes  Does your home have good lighting?: No  Do you struggle to care for yourself or your home?: No       Diet and Exercise  Diet and Exercise  Do you follow a special diet at home?: No    Advance Care Planning  Advance Care Planning  Would you like information on advance care planning such as living will?: No    Depression Screening   Negative     Fall Risk Assessment   Low     Durable Medical Equipment   None     Patient Care Team:  Karli Brito MD as PCP - General (Family Medicine)                             Comprehensive Medical and Social History  Past Medical History:   Diagnosis Date    Diabetes mellitus (CMS/HCC)     Hypertension     Hypothyroidism     Type 2 diabetes mellitus (CMS/HCC)      Past Surgical History:   Procedure Laterality Date    CARDIAC SURGERY  01/04/2016    Cardiac bypass X4 vessels    CARDIAC SURGERY  01/01/1997    2 stents    CATARACT EXTRACTION Left     CHOLECYSTECTOMY  02/14/2007    COLONOSCOPY      Approx 8 years ago with Dr Mcguire- was to have 5 year follow up    COLONOSCOPY  01/01/2015    Polyps     Social History     Socioeconomic History    Marital status:    Tobacco Use    Smoking status: Former     Current packs/day: 0.00     Types: Cigarettes    Smokeless tobacco: Never   Vaping Use    Vaping status: Never Used   Substance and Sexual Activity    Alcohol use: Yes     Comment: Occasionally    Drug use: No    Sexual activity: Defer     Social Determinants of Health     Tobacco Use: Medium Risk (10/2/2024)    Patient History     Smoking Tobacco  Use: Former     Smokeless Tobacco Use: Never     Family History   Problem Relation Age of Onset    Heart disease Mother     Heart attack Father         Myocardial infarction    Diabetes type II Brother         Toes removed due to diabetes     Allergies   Allergen Reactions    Amoxicillin      HIVES, NAUSEA    Penicillins      HIVES, NAUSEA     Current Outpatient Medications   Medication Sig Dispense Refill    lisinopriL (PRINIVIL,ZESTRIL) 10 mg tablet Take 1 tablet (10 mg total) by mouth daily 90 tablet 0    levothyroxine (SYNTHROID, LEVOTHROID) 175 mcg tablet Take 1 tablet (175 mcg total) by mouth daily 90 tablet 1    Jardiance 25 mg tablet TAKE ONE TABLET BY MOUTH EVERY DAY 90 tablet 0    pioglitazone (ACTOS) 15 mg tablet Take 1 tablet (15 mg total) by mouth daily Patient is unsure if he is taking this 90 tablet 3    latanoprost (XALATAN) 0.005 % ophthalmic solution Administer 1 drop into the left eye nightly      metoprolol tartrate (LOPRESSOR) 25 mg tablet Take 1 tablet (25 mg total) by mouth 2 (two) times a day 180 tablet 1    tamsulosin (FLOMAX) 0.4 mg capsule Take 2 capsules (0.8 mg total) by mouth daily with dinner 180 capsule 3    clopidogreL (PLAVIX) 75 mg tablet TAKE ONE TABLET BY MOUTH EVERY DAY 90 tablet 3    metFORMIN (GLUCOPHAGE) 1,000 mg tablet Take 1 tablet (1,000 mg total) by mouth 2 (two) times a day with meals 180 tablet 1    clindamycin (Cleocin T) 1 % lotion Apply 1 Application topically 2 (two) times a day Apply to face 2 times daily as needed 60 mL 1    carbamide peroxide (Debrox) 6.5 % otic solution Administer 5 drops into the right ear 2 (two) times a day for 4 days 15 mL 0     No current facility-administered medications for this visit.       The following have been reviewed and updated as appropriate in this visit:          Review of Systems   Constitutional:  Negative for activity change, chills, fatigue, fever and unexpected weight change.   HENT:  Negative for congestion and sore  throat.    Eyes:  Negative for visual disturbance.   Respiratory:  Negative for shortness of breath.    Cardiovascular:  Negative for chest pain, palpitations and leg swelling.   Gastrointestinal:  Negative for abdominal pain, diarrhea and nausea.   Genitourinary:  Positive for frequency (chronic). Negative for difficulty urinating and dysuria.   Musculoskeletal:  Negative for arthralgias.   Skin:  Negative for rash.   Neurological:  Negative for dizziness, weakness, light-headedness, numbness and headaches.   Hematological:  Does not bruise/bleed easily.   Psychiatric/Behavioral:  Negative for dysphoric mood and sleep disturbance.        Objective     Vitals:    10/02/24 0816   BP: 140/72   Temp: 36.7 °C (98.1 °F)   Pulse: 76   Resp: 18   SpO2: 96%   Weight: 103.9 kg (229 lb)   Patient Position: Sitting     Body mass index is 31.06 kg/m².    Physical Exam  Constitutional:       Appearance: Normal appearance.   HENT:      Right Ear: There is impacted cerumen.      Left Ear: There is impacted cerumen.      Nose: Nose normal.      Mouth/Throat:      Mouth: Mucous membranes are moist.      Pharynx: Oropharynx is clear.   Eyes:      Conjunctiva/sclera: Conjunctivae normal.   Cardiovascular:      Rate and Rhythm: Normal rate and regular rhythm.      Pulses: Normal pulses.   Pulmonary:      Effort: Pulmonary effort is normal.   Abdominal:      General: Bowel sounds are normal.      Palpations: Abdomen is soft.   Musculoskeletal:      Cervical back: Normal range of motion.      Right lower leg: No edema.      Left lower leg: No edema.   Skin:     General: Skin is warm and dry.      Capillary Refill: Capillary refill takes less than 2 seconds.   Neurological:      General: No focal deficit present.      Mental Status: He is alert and oriented to person, place, and time.   Psychiatric:         Mood and Affect: Mood normal.         Behavior: Behavior normal.         Thought Content: Thought content normal.         Judgment:  Judgment normal.         Assessment/Plan   Diagnoses and all orders for this visit:    Medicare annual wellness visit, subsequent    Type 2 diabetes mellitus without complication, without long-term current use of insulin (CMS/[facility])  -     metFORMIN (GLUCOPHAGE) 1,000 mg tablet; Take 1 tablet (1,000 mg total) by mouth 2 (two) times a day with meals    Bilateral impacted cerumen  -     carbamide peroxide (Debrox) 6.5 % otic solution; Administer 5 drops into the right ear 2 (two) times a day for 4 days    Folliculitis  -     clindamycin (Cleocin T) 1 % lotion; Apply 1 Application topically 2 (two) times a day Apply to face 2 times daily as needed    Essential hypertension  -     Basic metabolic panel Blood, Venous; Future  -     CBC w/auto differential Blood, Venous; Future    Mixed hyperlipidemia  -     Lipid panel Blood, Venous; Future    Assessment/Plan     Chronic Constipation  Unsatisfied with current regimen of Senna S and Fibercon. No bowel obstruction symptoms.  -Continue Fibercon.  -Start over-the-counter Miralax once daily.  -Consider adding Metamucil or Benefiber if needed.    Cerumen Impaction  Bilateral cerumen impaction, more severe on the left.  -Perform bilateral ear irrigation today.  -Irrigation was not successful he will administer home Debrox for 3 to 4 days then return to clinic for repeat irrigation    Type 2 Diabetes Mellitus  Currently on Metformin 1000mg twice daily, Jardiance 25mg daily, and Actos 15mg daily. Ran out of Metformin.  -Renew Metformin to 1000mg twice daily.  -Send prescription to [facility].  -Follow up with endocrinology Monday /labs prior     General Health Maintenance / Followup Plans  -Order annual labs including cholesterol panel for 10/7/2024.  -Continue current medications for BPH, hypertension, GERD, hyperlipidemia, coronary artery disease, and diabetic neuropathy.  -Continue to monitor prostate health and reach out if urinary symptoms worsen, may go back to  urology, declines today   -Declined vaccinations at this time.           During the course of the visit the patient was educated and counseled about appropriate screening and preventive services including:   Advanced Directives and End of Life Planning: Reviewed and discussed with patient.  Calcium 1200mg and Vitamin D 1000-2000iu recommended daily.  Cholesterol Screening: Reviewed and discussed with patient.  Colorectal Cancer Screening: Reviewed and discussed with patient.  Depression screening performed, results documented in note.  Diabetes Screening: Reviewed and discussed with patient.  ECG Screening: Reviewed and discussed with patient.  Glaucoma Screening: Reviewed and discussed with patient.  Indicated labs ordered as above  Influenza vaccine:  Reviewed need for annual immunization.  Pneumococcal Vaccine:  Reviewed and discussed with patient.  Shingles Vaccine:  Reviewed and discussed with patient.    Written screening schedule individualized for the patient based on current USPSTF, age-appropriate medicare services and ACIP as described above was given and viewable in Patient Instructions.    Mental health conditions and/or risk factors are as listed (if any) in the ROS above as specific to this patient.  Direct cognitive impairment was assessed.      Patient's lifestyle was evaluated to promote wellness in terms of but not limited to: fall prevention, nutrition, physical activity, tobacco/alcohol if present and weight management.  These were addressed specifically with this patient as listed within the social history, discussion notes, and above.

## 2024-10-02 NOTE — PATIENT INSTRUCTIONS
Preventative Care     Patient’s Personalized Health Advice and 5-10 Year Plan:      Physical Activity:   Physical activity can help you maintain a healthy weight, prevent or control illness, reduce stress, and sleep better. It can also help you improve your balance to avoid falls. Try to build up to and maintain a total of 30 minutes of activity each day. If you are able, try walking, doing yard or housework, and taking the stairs more often. You can also strengthen your muscles with exercises done while sitting or lying down.   Emotional Health:   Feeling “down in the dumps” or anxious every now and then is a natural part of life. If this feeling lasts for a few weeks or more, talk with me as soon as possible. It could be a sign of a problem that needs treatment. There are many types of treatment available.   Falls:   You can reduce your risk of falling by making changes in your home. Remove items that may cause tripping, improve lighting, and consider installing grab bars.   Talk with me if you have problems with balance and walking. To prevent falls, you may need your vision, hearing, or blood pressure checked. Exercises to improve your strength and balance, or using a cane or walker, may help.   Review your medicines with me at every visit because some can affect balance. Please be sure to let me know if you fall or are fearful you may fall.   Pain:   We all have aches and pains at times, but chronic pain can change how you feel and live every day. Please talk with me about any symptoms of chronic pain so that we can determine how best to treat.   Sleep:   Getting a good night’s sleep is vital to your health and well-being and can help prevent or manage health problems. Often, sleep can be improved by changing behaviors, including when you go to bed and what you do before bed. Sleep apnea can cause problems such as struggling to stay awake during the day. Please let me know if you would like to learn more about  improving your sleep and/or think you may have sleep apnea.   Home Safety:   You may benefit from a safety check to identify hazards in your home.   Seat Belt:   Please remember to wear a seat belt when driving or riding in a vehicle. It is one of the most important things you can do to stay safe in a car.   Nutrition:   Remember to eat plenty of fruits, vegetables, whole grains, and dairy. Drink at least 64 ounces (8 full glasses) of water a day unless you have been advised to limit fluids.   Alcohol:   Alcohol can have a greater effect on older people, who may feel its effects at a lower amount. Older people should limit alcoholic drinks (no more than one a day for women and no more than two a day for men). Please let me know if alcohol use becomes a problem.   Tobacco:   Not smoking or using other forms of tobacco is one of the most important things you can do for your health.        Additional Support:   Sometimes it can be challenging to manage all aspects of daily life. Finding the right support can help you maintain or improve your health and independence. Please let me know if you would like to talk further about finding resources to assist you.     Advance Directives:   There may come a time when medical decisions need to be made on your behalf. Please talk with your family and with me about your wishes. It is important to provide information about your decisions and any formal advance directives for your medical record.    SCREENINGS:  What Coverage & Frequency Why Previously Done   EKG One-time covered benefit during Welcome To Medicare Physical (IPPE) Check heart rate and rhythm for baseline. Date of Last ECG Order     Last order of ECG 12-LEAD was found on 9/22/2022 from Appointment on 9/22/2022        Lung Cancer Screening via Low Dos Chest CT Medicare covers annually if history of smoking with no signs or symptoms of lung cancer.  Screening for lung cancer.  Provider will need to advise of the  importance of quitting/avoiding smoking and provide smoking cessation services. Date of Last Chest CT Order     No order of CT CHEST LUNG CANCER LOW DOSE SCREENING is found.         AAA Screen Ultrasound for Abdominal Aortic Aneurysm One-time benefit with Welcome to Medicare Visit for men age 65-75 with history of smoking or positive family history of AAA or has smoked 100 or more cigarettes in his lifetime.   For early detection of abdominal aortic aneurysm. Date of Last AAA US Ordered     No order of US ABDOMINAL AORTA ANEURYSM SCREENING is found.         Osteoporosis Screening Every two years after the age of 65 for those at risk. Person with vertebral abnormalities on x-ray, or have received daily steroid for more than 3 months have increased risk for bone loss. Date of Last Dexa Ordered     No order of DXA BONE DENSITY is found.         Heart Disease Screening  Covers blood tests for all Medicare beneficiaries every 5 years to test for: Cholesterol Levels To check for high cholesterol, which can increase your risk of heart attack, stroke and other problems. Most Recent Cholesterol Results     Lab Results   Component Value Date    CHOL 125 06/08/2022      Lab Results   Component Value Date    HDL 26 (L) 06/08/2022        Lab Results   Component Value Date    LDLCALC <20 (L) 07/14/2020        Lab Results   Component Value Date    TRIG 540 (H) 06/08/2022      Colon cancer screening Recommend initiating colon cancer screening at age 45. Medicare will reimburse colonoscopy every 10 years and more frequently if at increased risk for developing colon cancer.? Medicare will cover FIT or 3 send home FOBTs annually (age 45 or greater) or Cologuard every 3 years (between the ages of 45-85).  Ordered as a screening to detect colon cancer before there are symptoms, so it can be more effectively treated. Last Colon Screening(s) Ordered      No order of COLOGUARD CANCER SCREENING KIT is found.           Last Referral To  "General Surgery     No order of AMB REFERRAL TO GENERAL SURGERY is found.          Last Referral to Gastroenterology     No order of AMB REFERRAL TO GASTROENTEROLOGY is found.        Diabetes screening Once yearly for patients previously tested but not diagnosed with pre-diabetes or patients who were never tested. Twice yearly for patients with pre-diabetes.  To identify diabetes that may not have symptoms. Last Fasting Glucose and A1C     No results found for: \"GLUF\"     Lab Results   Component Value Date    HGBA1C 8.4 (H) 07/08/2024      Sexually Transmitted Infection Screening Consider if sexually active and at risk for STI. Chlamydia, gonorrhea and syphilis annually for woman. Syphilis annually for men. To detect infections that may not have symptoms, to prevent complications.     Testing for Hepatitis C Covered Medicare benefit once if born [id number]. Covered annually, regardless of birth year, if considered high risk. To detect hepatitis C infection that may have no symptoms, to prevent complications. Last Hepatitis C Screening Result     No results found for: \"HEPCAB\"   Glaucoma Screening  Covered benefit annually if diabetic, family history of glaucoma,  Americans age 50+,  Americans age 65+ Screen for glaucoma and prevent complications.  Date of Last Referral Placed to Ophthalmology     No order of AMB REFERRAL TO OPHTHALMOLOGY is found.          Date of Last Referral Placed to Optometry  No order of AMB REFERRAL TO OPTOMETRY is found.         Prostate Cancer Screening Covers PSA blood test for all male patients 50 and older once every twelve months.   Detect risk for prostate cancer. Date of Last PSA Ordered   Last order of PSA DIAGNOSTIC was found on 3/10/2023 from Lab on 3/10/2023     Last order of PSA SCREEN was found on 9/24/2019 from Office Visit on 9/24/2019          Last PSA Result   Results for orders placed or performed in visit on 09/24/19   PSA screen Blood, Venous   Result Value " Ref Range    PSA 1.29 0.00 - 4.00 ng/mL     Results for orders placed or performed in visit on 03/10/23   PSA diagnostic Blood, Venous   Result Value Ref Range    PSA 1.07 0.00 - 4.00 ng/mL              Health Maintenance:     Health Maintenance Due   Topic Date Due   • Depression Screening  Never done   • Foot Exam  Never done   • Hepatitis C Screening  Never done   • DTaP,Tdap,and Td Vaccines (1 - Tdap) Never done   • Zoster Vaccines Series (1 of 2) Never done   • RSV 60 Years and Older or Pregnant (1 - 1-dose 60+ series) Never done   • Pneumococcal 65+ (1 of 1 - PCV) Never done   • Urine Microalbumin  06/08/2023   • Diabetic Eye Exam  09/13/2023   • COVID-19 Vaccine (1 - 2023-24 season) Never done   • Influenza Vaccine (1) 09/01/2024   • Hemoglobin A1C  10/08/2024           IMMUNIZATIONS:   Immunizations you have received:   Immunization History   Administered Date(s) Administered   • Influenza Split High Dose Preservative Free IM 01/06/2016        See grid below for the immunization recommendations.     What Age How Often Why   Flu Vaccine    Age 18 and older Every year Protects against flu virus and its complications.   Pneumonia Vaccine Age 65+  < 65 based on risk factors. Medicare covers an initial pneumonia vaccine and a booster pneumonia vaccine at least one year after the first vaccine. Protects against common types of pneumonia.   Shingles (Zoster) Vaccine Age 50 Not a covered benefit under Medicare.  Once in your lifetime Protects against shingles.   Tetanus Vaccine Age 18 and older Not a covered benefit under Medicare.  Every 10 years (at least one-time Tdap) Protects against tetanus infection.   Hepatitis B Vaccine Age 18 and older based on risk factors Covered if you are at high risk for hepatitis.  Your risk increases if you have End Stage Renal Disease (ESRD), diabetes, living with someone who has Hep B, or if health care worker. Protects against hepatitis, which can cause illness and complications.

## 2024-10-07 ENCOUNTER — CLINICAL SUPPORT (OUTPATIENT)
Dept: FAMILY MEDICINE | Facility: CLINIC | Age: 79
End: 2024-10-07
Payer: MEDICARE

## 2024-10-07 ENCOUNTER — LAB (OUTPATIENT)
Dept: LAB | Facility: CLINIC | Age: 79
End: 2024-10-07
Payer: MEDICARE

## 2024-10-07 ENCOUNTER — OFFICE VISIT (OUTPATIENT)
Dept: ENDOCRINOLOGY | Facility: CLINIC | Age: 79
End: 2024-10-07
Payer: MEDICARE

## 2024-10-07 VITALS
SYSTOLIC BLOOD PRESSURE: 130 MMHG | TEMPERATURE: 98.4 F | HEART RATE: 70 BPM | DIASTOLIC BLOOD PRESSURE: 76 MMHG | OXYGEN SATURATION: 93 % | WEIGHT: 231.8 LBS | BODY MASS INDEX: 31.44 KG/M2

## 2024-10-07 VITALS — BODY MASS INDEX: 31.46 KG/M2 | WEIGHT: 232 LBS | SYSTOLIC BLOOD PRESSURE: 130 MMHG | DIASTOLIC BLOOD PRESSURE: 80 MMHG

## 2024-10-07 DIAGNOSIS — I10 ESSENTIAL HYPERTENSION: ICD-10-CM

## 2024-10-07 DIAGNOSIS — E03.9 HYPOTHYROIDISM, UNSPECIFIED TYPE: ICD-10-CM

## 2024-10-07 DIAGNOSIS — R79.89 ELEVATED TSH: ICD-10-CM

## 2024-10-07 DIAGNOSIS — E11.9 TYPE 2 DIABETES MELLITUS WITHOUT COMPLICATION, WITHOUT LONG-TERM CURRENT USE OF INSULIN (CMS/HCC): ICD-10-CM

## 2024-10-07 DIAGNOSIS — E11.9 TYPE 2 DIABETES MELLITUS WITHOUT COMPLICATION, WITHOUT LONG-TERM CURRENT USE OF INSULIN (CMS/HCC): Primary | ICD-10-CM

## 2024-10-07 DIAGNOSIS — E78.2 MIXED HYPERLIPIDEMIA: ICD-10-CM

## 2024-10-07 DIAGNOSIS — H61.21 IMPACTED CERUMEN OF RIGHT EAR: Primary | ICD-10-CM

## 2024-10-07 LAB
ANION GAP SERPL CALC-SCNC: 12 MMOL/L (ref 3–11)
BASOPHILS # BLD AUTO: 0.03 10*3/UL
BASOPHILS NFR BLD AUTO: 0.4 % (ref 0–2)
BUN SERPL-MCNC: 20 MG/DL (ref 7–25)
CALCIUM SERPL-MCNC: 9.5 MG/DL (ref 8.6–10.3)
CHLORIDE SERPL-SCNC: 104 MMOL/L (ref 98–107)
CHOLEST SERPL-MCNC: 204 MG/DL (ref 0–199)
CO2 SERPL-SCNC: 24 MMOL/L (ref 21–32)
CREAT SERPL-MCNC: 0.9 MG/DL (ref 0.7–1.3)
EGFRCR SERPLBLD CKD-EPI 2021: 87 ML/MIN/1.73M*2
EOSINOPHIL # BLD AUTO: 0.16 10*3/UL
EOSINOPHIL NFR BLD AUTO: 2.2 % (ref 0–3)
ERYTHROCYTE [DISTWIDTH] IN BLOOD BY AUTOMATED COUNT: 13.4 % (ref 11.5–15)
EST. AVERAGE GLUCOSE BLD GHB EST-MCNC: 159.9 MG/DL
FASTING STATUS PATIENT QL REPORTED: YES
GLUCOSE SERPL-MCNC: 146 MG/DL (ref 70–105)
HBA1C MFR BLD: 7.2 % (ref 4.8–6)
HCT VFR BLD AUTO: 45.3 % (ref 38–50)
HDLC SERPL-MCNC: 26 MG/DL
HGB BLD-MCNC: 15.2 G/DL (ref 13.2–17.2)
LDLC SERPL DIRECT ASSAY-MCNC: 103 MG/DL
LYMPHOCYTES # BLD AUTO: 1.29 10*3/UL
LYMPHOCYTES NFR BLD AUTO: 17.7 % (ref 15–47)
MCH RBC QN AUTO: 31.2 PG (ref 29–34)
MCHC RBC AUTO-ENTMCNC: 33.5 G/DL (ref 32–36)
MCV RBC AUTO: 93.2 FL (ref 82–97)
MONOCYTES # BLD AUTO: 0.48 10*3/UL
MONOCYTES NFR BLD AUTO: 6.6 % (ref 5–13)
NEUTROPHILS # BLD AUTO: 5.33 10*3/UL
NEUTROPHILS NFR BLD AUTO: 73.1 % (ref 46–70)
PLATELET # BLD AUTO: 150 10*3/UL (ref 130–350)
PMV BLD AUTO: 7.3 FL (ref 6.9–10.8)
POTASSIUM SERPL-SCNC: 4.7 MMOL/L (ref 3.5–5.1)
RBC # BLD AUTO: 4.86 10*6/UL (ref 4.1–5.8)
SODIUM SERPL-SCNC: 140 MMOL/L (ref 128–145)
TRIGL SERPL-MCNC: 582 MG/DL
TSH SERPL DL<=0.05 MIU/L-ACNC: 4.36 UIU/ML (ref 0.34–4.82)
WBC # BLD AUTO: 7.3 10*3/UL (ref 3.7–9.6)

## 2024-10-07 PROCEDURE — 84443 ASSAY THYROID STIM HORMONE: CPT | Performed by: INTERNAL MEDICINE

## 2024-10-07 PROCEDURE — 83036 HEMOGLOBIN GLYCOSYLATED A1C: CPT | Mod: QW | Performed by: INTERNAL MEDICINE

## 2024-10-07 PROCEDURE — 80048 BASIC METABOLIC PNL TOTAL CA: CPT | Performed by: FAMILY MEDICINE

## 2024-10-07 PROCEDURE — G2211 COMPLEX E/M VISIT ADD ON: HCPCS | Performed by: INTERNAL MEDICINE

## 2024-10-07 PROCEDURE — 36415 COLL VENOUS BLD VENIPUNCTURE: CPT | Performed by: FAMILY MEDICINE

## 2024-10-07 PROCEDURE — 99214 OFFICE O/P EST MOD 30 MIN: CPT | Performed by: INTERNAL MEDICINE

## 2024-10-07 PROCEDURE — 85025 COMPLETE CBC W/AUTO DIFF WBC: CPT | Performed by: FAMILY MEDICINE

## 2024-10-07 PROCEDURE — 83721 ASSAY OF BLOOD LIPOPROTEIN: CPT | Mod: 59,51 | Performed by: FAMILY MEDICINE

## 2024-10-07 PROCEDURE — 80061 LIPID PANEL: CPT | Mod: NCP | Performed by: FAMILY MEDICINE

## 2024-10-07 NOTE — TELEPHONE ENCOUNTER
No care due was identified.  [facility] Embedded Care Due Messages. Reference number: [id number]. 10/07/2024 8:16:41 AM RYAN

## 2024-10-07 NOTE — PROGRESS NOTES
Reason for clinic visit: Type 2 diabetes and hypothyroidism    History of present illness:  79-year-old male past medical history of type 2 diabetes.  Diabetes has been complicated by history of coronary artery disease status post bypass x4.  Most recent A1c 7.2%.  Currently on Jardiance 25 mg daily, pioglitazone 15 mg daily metformin 1 g p.o. twice daily.    A1c is down from 8.4%.  Otherwise noting no new medical issues.    Recent TSH was elevated.  Dose of thyroid was changed to 175 mcg daily.  Repeat levels pending today.    Past Medical History:   Diagnosis Date    Diabetes mellitus (CMS/HCC)     Hypertension     Hypothyroidism     Type 2 diabetes mellitus (CMS/HCC)        Past Surgical History:   Procedure Laterality Date    CARDIAC SURGERY  01/04/2016    Cardiac bypass X4 vessels    CARDIAC SURGERY  01/01/1997    2 stents    CATARACT EXTRACTION Left     CHOLECYSTECTOMY  02/14/2007    COLONOSCOPY      Approx 8 years ago with Dr Mcguire- was to have 5 year follow up    COLONOSCOPY  01/01/2015    Polyps       Current Outpatient Medications on File Prior to Visit   Medication Sig Dispense Refill    metFORMIN (GLUCOPHAGE) 1,000 mg tablet Take 1 tablet (1,000 mg total) by mouth 2 (two) times a day with meals 180 tablet 1    clindamycin (Cleocin T) 1 % lotion Apply 1 Application topically 2 (two) times a day Apply to face 2 times daily as needed 60 mL 1    lisinopriL (PRINIVIL,ZESTRIL) 10 mg tablet Take 1 tablet (10 mg total) by mouth daily 90 tablet 0    levothyroxine (SYNTHROID, LEVOTHROID) 175 mcg tablet Take 1 tablet (175 mcg total) by mouth daily 90 tablet 1    Jardiance 25 mg tablet TAKE ONE TABLET BY MOUTH EVERY DAY 90 tablet 0    pioglitazone (ACTOS) 15 mg tablet Take 1 tablet (15 mg total) by mouth daily Patient is unsure if he is taking this 90 tablet 3    latanoprost (XALATAN) 0.005 % ophthalmic solution Administer 1 drop into the left eye nightly      metoprolol tartrate (LOPRESSOR) 25 mg tablet Take 1  tablet (25 mg total) by mouth 2 (two) times a day 180 tablet 1    tamsulosin (FLOMAX) 0.4 mg capsule Take 2 capsules (0.8 mg total) by mouth daily with dinner 180 capsule 3    clopidogreL (PLAVIX) 75 mg tablet TAKE ONE TABLET BY MOUTH EVERY DAY 90 tablet 3    carbamide peroxide (Debrox) 6.5 % otic solution Administer 5 drops into the right ear 2 (two) times a day for 4 days 15 mL 0     No current facility-administered medications on file prior to visit.         Allergies   Allergen Reactions    Amoxicillin      HIVES, NAUSEA    Penicillins      HIVES, NAUSEA         Social History     Socioeconomic History    Marital status:      Spouse name: Not on file    Number of children: Not on file    Years of education: Not on file    Highest education level: Not on file   Occupational History    Not on file   Tobacco Use    Smoking status: Former     Current packs/day: 0.00     Types: Cigarettes    Smokeless tobacco: Never   Vaping Use    Vaping status: Never Used   Substance and Sexual Activity    Alcohol use: Yes     Comment: Occasionally    Drug use: No    Sexual activity: Defer   Other Topics Concern    Not on file   Social History Narrative    Not on file     Social Determinants of Health     Financial Resource Strain: Not on file   Food Insecurity: Not on file   Transportation Needs: Not on file   Physical Activity: Not on file   Stress: Not on file   Social Connections: Not on file   Intimate Partner Violence: Not on file   Housing Stability: Not on file         Family History   Problem Relation Age of Onset    Heart disease Mother     Heart attack Father         Myocardial infarction    Diabetes type II Brother         Toes removed due to diabetes       Comprehensive 14 point review of systems is otherwise unremarkable except what has been mentioned in the history of present illness      /76   Pulse 70   Temp 36.9 °C (98.4 °F)   Wt 105.1 kg (231 lb 12.8 oz)   SpO2 93%   BMI 31.44 kg/m²   General  appearance: in no acute distress  Eyes: No stare, proptosis. Conjunctiva clear.  ENT: No external lesions. Hearing intact to the spoken word.  Lungs:   Respirations not labored  Heart: Regular in rate   Neurologic:No focal deficit   Psychiatric: alert, appropriate, well dressed and groomed        Lab Results   Component Value Date    HGBA1C 7.2 (H) 10/07/2024       Lab Results   Component Value Date    LDLCALC <20 (L) 07/14/2020         Lab Results   Component Value Date    MICROALBUR 15.6 06/08/2022         Lab Results   Component Value Date    TSH 5.253 (H) 07/08/2024       Lab Results   Component Value Date    GLUCOSE 129 (H) 04/08/2024    CALCIUM 9.8 04/08/2024     04/08/2024    K 4.8 04/08/2024    CO2 27 04/08/2024     04/08/2024    BUN 17 04/08/2024    CREATININE 1.10 04/08/2024    ANIONGAP 7 04/08/2024     Impression:  1.  Type 2 diabetes.  Currently uncontrolled. Improved.    2.  Hypothyroidism.  Repeat TSH today pending.  Await results adjust thyroid as needed    3.  Most recent LDL cholesterol has been at goal.  Repeat levels pending today    4.  Blood pressure at goal.        Plan:  1.  Continue current Jardiance  2.  Continue pioglitazone  3.  Continue metformin  4.  Await labs.  Recommendations follow  5.  Follow-up in 6 months with repeat labs then    Fahad Mendoza DO

## 2024-10-08 DIAGNOSIS — R79.89 ELEVATED TSH: ICD-10-CM

## 2024-10-08 DIAGNOSIS — E03.9 HYPOTHYROIDISM, UNSPECIFIED TYPE: ICD-10-CM

## 2024-10-08 DIAGNOSIS — E11.9 TYPE 2 DIABETES MELLITUS WITHOUT COMPLICATION, WITHOUT LONG-TERM CURRENT USE OF INSULIN (CMS/HCC): Primary | ICD-10-CM

## 2024-10-08 RX ORDER — EMPAGLIFLOZIN 25 MG/1
1 TABLET, FILM COATED ORAL DAILY
Qty: 90 TABLET | Refills: 0 | Status: SHIPPED | OUTPATIENT
Start: 2024-10-08 | End: 2025-01-08

## 2024-10-08 NOTE — TELEPHONE ENCOUNTER
Medication refill request:  Medication(s):  Jardiance not filled due to Previous refill visit indicating specific care plan, please review for refill

## 2024-10-14 DIAGNOSIS — I10 ESSENTIAL HYPERTENSION: ICD-10-CM

## 2024-10-14 NOTE — TELEPHONE ENCOUNTER
No care due was identified.  [facility] Embedded Care Due Messages. Reference number: [id number]. 10/14/2024 11:51:43 AM RYAN

## 2024-10-16 RX ORDER — METOPROLOL TARTRATE 25 MG/1
25 TABLET, FILM COATED ORAL 2 TIMES DAILY
Qty: 180 TABLET | Refills: 3 | Status: SHIPPED | OUTPATIENT
Start: 2024-10-16

## 2024-10-30 DIAGNOSIS — I25.118 CORONARY ARTERY DISEASE OF NATIVE ARTERY OF NATIVE HEART WITH STABLE ANGINA PECTORIS (CMS/HCC): ICD-10-CM

## 2024-10-30 DIAGNOSIS — N40.1 BENIGN PROSTATIC HYPERPLASIA WITH URINARY FREQUENCY: ICD-10-CM

## 2024-10-30 DIAGNOSIS — R35.0 BENIGN PROSTATIC HYPERPLASIA WITH URINARY FREQUENCY: ICD-10-CM

## 2024-10-30 NOTE — TELEPHONE ENCOUNTER
No care due was identified.  [facility] Embedded Care Due Messages. Reference number: [id number]. 10/30/2024 8:02:19 AM RYAN

## 2024-10-31 RX ORDER — TAMSULOSIN HYDROCHLORIDE 0.4 MG/1
CAPSULE ORAL
Qty: 180 CAPSULE | Refills: 3 | Status: SHIPPED | OUTPATIENT
Start: 2024-10-31

## 2024-10-31 RX ORDER — CLOPIDOGREL BISULFATE 75 MG/1
TABLET ORAL
Qty: 90 TABLET | Refills: 3 | Status: SHIPPED | OUTPATIENT
Start: 2024-10-31

## 2024-10-31 NOTE — TELEPHONE ENCOUNTER
Medication refill request:  Medication(s):  plavix not filled due to Lab (s) are due and Patient does not have a future appointment scheduled within 90 days

## 2024-12-19 ENCOUNTER — OFFICE VISIT (OUTPATIENT)
Dept: FAMILY MEDICINE | Facility: CLINIC | Age: 79
End: 2024-12-19
Payer: MEDICARE

## 2024-12-19 VITALS
WEIGHT: 231 LBS | BODY MASS INDEX: 31.33 KG/M2 | OXYGEN SATURATION: 94 % | DIASTOLIC BLOOD PRESSURE: 70 MMHG | HEART RATE: 73 BPM | TEMPERATURE: 98 F | SYSTOLIC BLOOD PRESSURE: 138 MMHG | RESPIRATION RATE: 18 BRPM

## 2024-12-19 DIAGNOSIS — J01.40 ACUTE NON-RECURRENT PANSINUSITIS: Primary | ICD-10-CM

## 2024-12-19 PROCEDURE — 99213 OFFICE O/P EST LOW 20 MIN: CPT

## 2024-12-19 RX ORDER — DOXYCYCLINE 100 MG/1
100 CAPSULE ORAL 2 TIMES DAILY
Qty: 14 CAPSULE | Refills: 0 | Status: SHIPPED | OUTPATIENT
Start: 2024-12-19 | End: 2024-12-26

## 2024-12-19 NOTE — PROGRESS NOTES
Subjective      Abhilash Huggins is a 79 y.o. male who presents for sinus congestion.    History of Present Illness    The patient, nearing 80 years of age, presents with a three-week history of sore throat, soreness at the back of the tongue, and dryness in the sinuses. They report experiencing headaches, typically in the afternoon, but deny any significant facial pressure or pain when clenching teeth or bending over. The patient does, however, report long-standing left-sided facial pain. They have a history of sinus infections and the current symptoms are reminiscent of previous episodes. The patient denies any ear pain or pressure. They have been prescribed doxycycline in the past for similar symptoms. The patient is also planning a family gathering in the near future and is eager to recover before the event.        The following have been reviewed and updated as appropriate in this visit:   Allergies  Meds  Problems  Med Hx  Surg Hx  Fam Hx         Allergies   Allergen Reactions    Amoxicillin      HIVES, NAUSEA    Penicillins      HIVES, NAUSEA     Current Outpatient Medications   Medication Sig Dispense Refill    doxycycline (MONODOX) 100 mg capsule Take 1 capsule (100 mg total) by mouth 2 (two) times a day for 7 days 14 capsule 0    tamsulosin (FLOMAX) 0.4 mg capsule TAKE TWO CAPSULES BY MOUTH EVERY DAY with dinner 180 capsule 3    clopidogreL (PLAVIX) 75 mg tablet TAKE ONE TABLET BY MOUTH EVERY DAY 90 tablet 3    metoprolol tartrate (LOPRESSOR) 25 mg tablet TAKE ONE TABLET BY MOUTH TWICE DAILY 180 tablet 3    Jardiance 25 mg tablet TAKE ONE TABLET BY MOUTH EVERY DAY 90 tablet 0    metFORMIN (GLUCOPHAGE) 1,000 mg tablet Take 1 tablet (1,000 mg total) by mouth 2 (two) times a day with meals 180 tablet 1    clindamycin (Cleocin T) 1 % lotion Apply 1 Application topically 2 (two) times a day Apply to face 2 times daily as needed 60 mL 1    carbamide peroxide (Debrox) 6.5 % otic solution Administer 5 drops into  the right ear 2 (two) times a day for 4 days 15 mL 0    lisinopriL (PRINIVIL,ZESTRIL) 10 mg tablet Take 1 tablet (10 mg total) by mouth daily 90 tablet 0    levothyroxine (SYNTHROID, LEVOTHROID) 175 mcg tablet Take 1 tablet (175 mcg total) by mouth daily 90 tablet 1    pioglitazone (ACTOS) 15 mg tablet Take 1 tablet (15 mg total) by mouth daily Patient is unsure if he is taking this 90 tablet 3    latanoprost (XALATAN) 0.005 % ophthalmic solution Administer 1 drop into the left eye nightly       No current facility-administered medications for this visit.     Past Medical History:   Diagnosis Date    Diabetes mellitus (CMS/HCC)     Hypertension     Hypothyroidism     Type 2 diabetes mellitus (CMS/HCC)      Past Surgical History:   Procedure Laterality Date    CARDIAC SURGERY  01/04/2016    Cardiac bypass X4 vessels    CARDIAC SURGERY  01/01/1997    2 stents    CATARACT EXTRACTION Left     CHOLECYSTECTOMY  02/14/2007    COLONOSCOPY      Approx 8 years ago with Dr Mcguire- was to have 5 year follow up    COLONOSCOPY  01/01/2015    Polyps     Family History   Problem Relation Age of Onset    Heart disease Mother     Heart attack Father         Myocardial infarction    Diabetes type II Brother         Toes removed due to diabetes     Social History     Socioeconomic History    Marital status:    Tobacco Use    Smoking status: Former     Current packs/day: 0.00     Types: Cigarettes    Smokeless tobacco: Never   Vaping Use    Vaping status: Never Used   Substance and Sexual Activity    Alcohol use: Yes     Comment: Occasionally    Drug use: No    Sexual activity: Defer     Social Drivers of Health     Tobacco Use: Medium Risk (12/19/2024)    Patient History     Smoking Tobacco Use: Former     Smokeless Tobacco Use: Never       Review of Systems  Negative ROS except noted in HPI    Objective   /70 (BP Location: Left arm, Patient Position: Sitting, Cuff Size: Large Adult)   Pulse 73   Temp 36.7 °C (98 °F)  (Temporal)   Resp 18   Wt 104.8 kg (231 lb)   SpO2 94%   BMI 31.33 kg/m²     Physical Exam  Vitals and nursing note reviewed.   Constitutional:       Appearance: Normal appearance.   HENT:      Right Ear: Tympanic membrane normal.      Left Ear: Tympanic membrane normal.      Nose: Congestion present. No rhinorrhea.      Right Sinus: No maxillary sinus tenderness or frontal sinus tenderness.      Left Sinus: Maxillary sinus tenderness present. No frontal sinus tenderness.   Eyes:      Conjunctiva/sclera: Conjunctivae normal.      Pupils: Pupils are equal, round, and reactive to light.   Cardiovascular:      Rate and Rhythm: Normal rate and regular rhythm.      Heart sounds: Normal heart sounds.   Pulmonary:      Effort: Pulmonary effort is normal.      Breath sounds: Normal breath sounds.   Neurological:      Mental Status: He is alert and oriented to person, place, and time.   Psychiatric:         Mood and Affect: Mood normal.         Behavior: Behavior normal.           Assessment/Plan     Sinusitis  Patient presents with a 3-week history of sore throat, dry sinuses, and afternoon headaches. No significant pressure in face or ears. Previous history of sinus infections. Physical examination revealed tenderness on the left side of the face.  -Start Doxycycline, with instructions to avoid dairy around the time of medication intake.  -Advise patient to increase water intake and consider sinus rinses with distilled water.  - RTC of s/s do not improve        Diagnosis Plan   1. Acute non-recurrent pansinusitis  doxycycline (MONODOX) 100 mg capsule            Alma Albright, HADLEY

## 2025-01-07 DIAGNOSIS — I10 ESSENTIAL HYPERTENSION: ICD-10-CM

## 2025-01-07 DIAGNOSIS — E11.9 TYPE 2 DIABETES MELLITUS WITHOUT COMPLICATION, WITHOUT LONG-TERM CURRENT USE OF INSULIN (CMS/HCC): ICD-10-CM

## 2025-01-07 NOTE — TELEPHONE ENCOUNTER
Care Due:                  Date            Visit Type   Department     Provider  --------------------------------------------------------------------------------                              ESTABLISHED                              PATIENT      [id number]  Last Visit: 12-      EXTENDED     MEDICINE       MIKAL URIOSTEGUI  Next Visit: None Scheduled  None         None Found                                                            Last  Test          Frequency    Reason                     Performed    Due Date  --------------------------------------------------------------------------------  ALT.........  6 months...  clopidogreL..............  04-   10-    [facility] Embedded Care Due Messages. Reference number: [id number]. 1/07/2025 1:08:20 PM MST

## 2025-01-08 RX ORDER — LISINOPRIL 10 MG/1
10 TABLET ORAL DAILY
Qty: 90 TABLET | Refills: 1 | Status: SHIPPED | OUTPATIENT
Start: 2025-01-08

## 2025-01-08 RX ORDER — EMPAGLIFLOZIN 25 MG/1
1 TABLET, FILM COATED ORAL DAILY
Qty: 90 TABLET | Refills: 1 | Status: SHIPPED | OUTPATIENT
Start: 2025-01-08

## 2025-02-05 DIAGNOSIS — E03.9 HYPOTHYROIDISM, UNSPECIFIED TYPE: ICD-10-CM

## 2025-02-05 RX ORDER — LEVOTHYROXINE SODIUM 175 UG/1
175 TABLET ORAL DAILY
Qty: 90 TABLET | Refills: 1 | Status: SHIPPED | OUTPATIENT
Start: 2025-02-05

## 2025-03-25 NOTE — PROGRESS NOTES
E-Advice sent to pt.   Reason for clinic visit: Type 2 diabetes and hypothyroidism    History of present illness:  78-year-old male past medical history of type 2 diabetes.  Diabetes has been complicated by history of coronary artery disease status post bypass x4.  Most recent A1c 7.7%.  Currently on Jardiance 25 mg daily.    He is also on metformin.  Has not been on GLP-1 previously.    For his thyroid, this has been a more recent diagnosis.  Due for TSH today.  Currently on 75 mcg of thyroid replacement    Otherwise has been feeling well.  No recent fever, chills, chest pain, nausea or other complaint    Past Medical History:   Diagnosis Date    Diabetes mellitus (CMS/HCC)     Hypertension     Hypothyroidism     Type 2 diabetes mellitus (CMS/HCC)        Past Surgical History:   Procedure Laterality Date    CARDIAC SURGERY  01/04/2016    Cardiac bypass X4 vessels    CARDIAC SURGERY  01/01/1997    2 stents    CATARACT EXTRACTION Left     CHOLECYSTECTOMY  02/14/2007    COLONOSCOPY      Approx 8 years ago with Dr Mcguire- was to have 5 year follow up    COLONOSCOPY  01/01/2015    Polyps       Current Outpatient Medications on File Prior to Visit   Medication Sig Dispense Refill    amitriptyline (ELAVIL) 10 mg tablet TAKE ONE TABLET BY MOUTH AT BEDTIME AS NEEDED FOR SLEEP 30 tablet 1    metoprolol tartrate (LOPRESSOR) 25 mg tablet TAKE ONE TABLET BY MOUTH TWICE DAILY 180 tablet 0    lisinopriL (PRINIVIL,ZESTRIL) 10 mg tablet TAKE ONE TABLET BY MOUTH EVERY DAY 90 tablet 1    levothyroxine (SYNTHROID, LEVOTHROID) 75 mcg tablet Take 1 tablet (75 mcg total) by mouth daily 90 tablet 0    metFORMIN XR (GLUCOPHAGE-XR) 500 mg 24 hr tablet Take 2 tablets (1,000 mg total) by mouth 2 (two) times a day 360 tablet 0    empagliflozin (Jardiance) 25 mg tablet Take 1 tablet (25 mg total) by mouth daily 90 tablet 0    tamsulosin (FLOMAX) 0.4 mg capsule TAKE TWO CAPSULES BY MOUTH EVERY DAY with dinner 180 capsule 0    finasteride (PROSCAR) 5 mg tablet Take  1 tablet (5 mg total) by mouth daily 90 tablet 3    atorvastatin (LIPITOR) 80 mg tablet TAKE ONE TABLET BY MOUTH EVERY DAY 90 tablet 3    clopidogreL (PLAVIX) 75 mg tablet TAKE ONE TABLET BY MOUTH EVERY DAY 90 tablet 3    levothyroxine (SYNTHROID, LEVOTHROID) 50 mcg tablet Take 1 tablet (50 mcg total) by mouth daily (Patient not taking: Reported on 10/9/2023) 60 tablet 0    lansoprazole (Prevacid) 15 mg capsule Take 1 capsule (15 mg total) by mouth daily (Patient not taking: Reported on 10/9/2023) 30 capsule 2    Rhopressa 0.02 % drops SMARTSI Drop(s) Left Eye Every Evening       No current facility-administered medications on file prior to visit.         Allergies   Allergen Reactions    Amoxicillin      HIVES, NAUSEA    Penicillins      HIVES, NAUSEA         Social History     Socioeconomic History    Marital status:      Spouse name: Not on file    Number of children: Not on file    Years of education: Not on file    Highest education level: Not on file   Occupational History    Not on file   Tobacco Use    Smoking status: Former     Packs/day: 0.00     Years: 20.00     Additional pack years: 0.00     Total pack years: 0.00     Types: Cigarettes    Smokeless tobacco: Never   Vaping Use    Vaping Use: Never used   Substance and Sexual Activity    Alcohol use: Yes     Comment: Occasionally    Drug use: No    Sexual activity: Defer   Other Topics Concern    Not on file   Social History Narrative    Not on file     Social Determinants of Health     Financial Resource Strain: Not on file   Food Insecurity: Not on file   Transportation Needs: Not on file   Physical Activity: Not on file   Stress: Not on file   Social Connections: Not on file   Intimate Partner Violence: Not on file   Housing Stability: Not on file         Family History   Problem Relation Age of Onset    Heart disease Mother     Heart attack Father         Myocardial infarction    Diabetes type II Brother         Toes removed due to diabetes        Comprehensive 14 point review of systems is otherwise unremarkable except what has been mentioned in the history of present illness      /72 (Patient Position: Sitting)   Pulse 73   Resp 16   Wt 102.5 kg (226 lb)   SpO2 93%   BMI 30.65 kg/m²   General appearance: in no acute distress  Eyes: No stare, proptosis. Conjunctiva clear.  ENT: No external lesions. Hearing intact to the spoken word.  Lungs:   Respirations not labored  Heart: Regular in rate   Neurologic:No focal deficit   Psychiatric: alert, appropriate, well dressed and groomed        Lab Results   Component Value Date    HGBA1C 7.7 (H) 10/09/2023       Lab Results   Component Value Date    LDLCALC <20 (L) 07/14/2020         Lab Results   Component Value Date    MICROALBUR 15.6 06/08/2022         Lab Results   Component Value Date    TSH 12.346 (H) 08/23/2023       Lab Results   Component Value Date    GLUCOSE 172 (H) 10/09/2023    CALCIUM 9.7 10/09/2023     10/09/2023    K 4.8 10/09/2023    CO2 29 10/09/2023     10/09/2023    BUN 17 10/09/2023    CREATININE 1.00 10/09/2023    ANIONGAP 8 10/09/2023     Impression:  1.  Type 2 diabetes.  Currently uncontrolled.  Discussed treatment options.  Discussed GLP-1 in particular.  Patient prefer not to have injectable therapy.  A1c 7.7%.  We will have the patient start pioglitazone 15 mg daily.  Will reassess glucose levels again in 6 months.  If still elevated consider GLP-1    2.  Hypothyroidism.  Repeat TSH today elevated at greater than 9.  Increase thyroid replacement to 125 mcg daily plan repeat TSH at follow-up    3.  Most recent LDL cholesterol has been at goal    4.  Blood pressure mildly elevated today.  Previous values have been at goal    Plan:  1.  Start pioglitazone 15 mg daily  2.  Increase levothyroxine 125 mcg daily  3.  Follow-up in 6 months with repeat labs      Fahad Mendoza DO

## 2025-03-26 DIAGNOSIS — E11.9 TYPE 2 DIABETES MELLITUS WITHOUT COMPLICATION, WITHOUT LONG-TERM CURRENT USE OF INSULIN (CMS/HCC): ICD-10-CM

## 2025-03-26 NOTE — TELEPHONE ENCOUNTER
Care Due:                  Date            Visit Type   Department     Provider  --------------------------------------------------------------------------------                              ESTABLISHED                              PATIENT      [id number]  Last Visit: 12-      [facility]       MIKAL URIOSTEGUI  Next Visit: None Scheduled  None         None Found                                                            Last  Test          Frequency    Reason                     Performed    Due Date  --------------------------------------------------------------------------------  Office Visit  6 months...  clindamycin, clopidogreL,   10-   03-                             empagliflozin, metFORMIN.    ALT.........  6 months...  clopidogreL..............  04-   10-    CBC.........  6 months...  clopidogreL..............  Not Found    Overdue    HBA1C.......  6 months...  empagliflozin, metFORMIN.  10-   04-    [facility] Embedded Care Due Messages. Reference number: [id number]. 3/26/2025 8:02:53 AM RYAN

## 2025-03-27 RX ORDER — METFORMIN HYDROCHLORIDE 1000 MG/1
1000 TABLET ORAL 2 TIMES DAILY WITH MEALS
Qty: 180 TABLET | Refills: 0 | Status: SHIPPED | OUTPATIENT
Start: 2025-03-27

## 2025-04-07 ENCOUNTER — RESULTS FOLLOW-UP (OUTPATIENT)
Dept: ENDOCRINOLOGY | Facility: CLINIC | Age: 80
End: 2025-04-07

## 2025-04-07 ENCOUNTER — OFFICE VISIT (OUTPATIENT)
Dept: ENDOCRINOLOGY | Facility: CLINIC | Age: 80
End: 2025-04-07
Payer: MEDICARE

## 2025-04-07 ENCOUNTER — LAB (OUTPATIENT)
Dept: LAB | Facility: CLINIC | Age: 80
End: 2025-04-07
Payer: MEDICARE

## 2025-04-07 VITALS
DIASTOLIC BLOOD PRESSURE: 84 MMHG | BODY MASS INDEX: 30.68 KG/M2 | OXYGEN SATURATION: 94 % | HEART RATE: 70 BPM | WEIGHT: 226.2 LBS | TEMPERATURE: 98.3 F | SYSTOLIC BLOOD PRESSURE: 142 MMHG

## 2025-04-07 DIAGNOSIS — R79.89 ELEVATED TSH: ICD-10-CM

## 2025-04-07 DIAGNOSIS — S76.309A HAMSTRING INJURY, UNSPECIFIED LATERALITY, INITIAL ENCOUNTER: Primary | ICD-10-CM

## 2025-04-07 DIAGNOSIS — E11.9 TYPE 2 DIABETES MELLITUS WITHOUT COMPLICATION, WITHOUT LONG-TERM CURRENT USE OF INSULIN (CMS/HCC): Primary | ICD-10-CM

## 2025-04-07 DIAGNOSIS — E11.9 TYPE 2 DIABETES MELLITUS WITHOUT COMPLICATION, WITHOUT LONG-TERM CURRENT USE OF INSULIN (CMS/HCC): ICD-10-CM

## 2025-04-07 DIAGNOSIS — G47.00 INSOMNIA, UNSPECIFIED TYPE: Primary | ICD-10-CM

## 2025-04-07 DIAGNOSIS — E03.9 HYPOTHYROIDISM, UNSPECIFIED TYPE: ICD-10-CM

## 2025-04-07 LAB
ALBUMIN SERPL-MCNC: 3.9 G/DL (ref 3.3–5.5)
ALP SERPL-CCNC: 48 U/L (ref 45–115)
ALT SERPL-CCNC: 33 U/L (ref 10–47)
ANION GAP SERPL CALC-SCNC: 12 MMOL/L (ref 3–11)
AST SERPL-CCNC: 39 U/L
BILIRUB SERPL-MCNC: 0.9 MG/DL (ref 0.2–1.4)
BUN SERPL-MCNC: 18 MG/DL (ref 7–25)
CALCIUM ALBUM COR SERPL-MCNC: 10.3 MG/DL (ref 8.6–10.3)
CALCIUM SERPL-MCNC: 10.2 MG/DL (ref 8.6–10.3)
CHLORIDE SERPL-SCNC: 107 MMOL/L (ref 98–107)
CHOLEST SERPL-MCNC: 201 MG/DL (ref 0–199)
CO2 SERPL-SCNC: 23 MMOL/L (ref 21–32)
CREAT SERPL-MCNC: 0.8 MG/DL (ref 0.7–1.3)
EGFRCR SERPLBLD CKD-EPI 2021: 89 ML/MIN/1.73M*2
EST. AVERAGE GLUCOSE BLD GHB EST-MCNC: 162.8 MG/DL
FASTING STATUS PATIENT QL REPORTED: NO
GLUCOSE SERPL-MCNC: 129 MG/DL (ref 70–105)
HBA1C MFR BLD: 7.3 % (ref 4.8–6)
HDLC SERPL-MCNC: 30 MG/DL
LDLC SERPL DIRECT ASSAY-MCNC: 102 MG/DL
POTASSIUM SERPL-SCNC: 4.8 MMOL/L (ref 3.5–5.1)
PROT SERPL-MCNC: 8.5 G/DL (ref 6.4–8.1)
SODIUM SERPL-SCNC: 142 MMOL/L (ref 128–145)
TRIGL SERPL-MCNC: 556 MG/DL
TSH SERPL DL<=0.05 MIU/L-ACNC: 2.18 UIU/ML (ref 0.34–4.82)

## 2025-04-07 PROCEDURE — 80061 LIPID PANEL: CPT | Mod: NCP | Performed by: INTERNAL MEDICINE

## 2025-04-07 PROCEDURE — 36415 COLL VENOUS BLD VENIPUNCTURE: CPT | Performed by: INTERNAL MEDICINE

## 2025-04-07 PROCEDURE — 83036 HEMOGLOBIN GLYCOSYLATED A1C: CPT | Mod: QW | Performed by: INTERNAL MEDICINE

## 2025-04-07 PROCEDURE — 84443 ASSAY THYROID STIM HORMONE: CPT | Performed by: INTERNAL MEDICINE

## 2025-04-07 PROCEDURE — 99214 OFFICE O/P EST MOD 30 MIN: CPT | Performed by: INTERNAL MEDICINE

## 2025-04-07 PROCEDURE — 83721 ASSAY OF BLOOD LIPOPROTEIN: CPT | Performed by: INTERNAL MEDICINE

## 2025-04-07 PROCEDURE — G2211 COMPLEX E/M VISIT ADD ON: HCPCS | Performed by: INTERNAL MEDICINE

## 2025-04-07 PROCEDURE — 80053 COMPREHEN METABOLIC PANEL: CPT | Performed by: INTERNAL MEDICINE

## 2025-04-07 RX ORDER — TRAZODONE HYDROCHLORIDE 50 MG/1
50 TABLET ORAL NIGHTLY
Qty: 90 TABLET | Refills: 3 | Status: SHIPPED | OUTPATIENT
Start: 2025-04-07

## 2025-04-07 NOTE — PROGRESS NOTES
Reason for clinic visit: Type 2 diabetes and hypothyroidism    History of present illness:  80-year-old male past medical history of type 2 diabetes.  Diabetes has been complicated by history of coronary artery disease status post bypass x4.  Most recent A1c 7.3%.  Currently on Jardiance 25 mg daily, pioglitazone 15 mg daily metformin 1 g p.o. twice daily.    He is not wanting to go on any injectable therapeutics    He has been complaining of hamstring pain bilaterally    He has been complaining of insomnia   Past Medical History:   Diagnosis Date    Diabetes mellitus (CMS/HCC)     Hypertension     Hypothyroidism     Type 2 diabetes mellitus (CMS/HCC)        Past Surgical History:   Procedure Laterality Date    CARDIAC SURGERY  01/04/2016    Cardiac bypass X4 vessels    CARDIAC SURGERY  01/01/1997    2 stents    CATARACT EXTRACTION Left     CHOLECYSTECTOMY  02/14/2007    COLONOSCOPY      Approx 8 years ago with Dr Mcguire- was to have 5 year follow up    COLONOSCOPY  01/01/2015    Polyps       Current Outpatient Medications on File Prior to Visit   Medication Sig Dispense Refill    metFORMIN (GLUCOPHAGE) 1,000 mg tablet Take 1 tablet (1,000 mg total) by mouth 2 (two) times a day with meals 180 tablet 0    levothyroxine (SYNTHROID, LEVOTHROID) 175 mcg tablet TAKE ONE TABLET BY MOUTH EVERY DAY 90 tablet 1    lisinopriL (PRINIVIL,ZESTRIL) 10 mg tablet TAKE ONE TABLET BY MOUTH EVERY DAY 90 tablet 1    empagliflozin (Jardiance) 25 mg tablet TAKE ONE TABLET BY MOUTH EVERY DAY 90 tablet 1    tamsulosin (FLOMAX) 0.4 mg capsule TAKE TWO CAPSULES BY MOUTH EVERY DAY with dinner 180 capsule 3    clopidogreL (PLAVIX) 75 mg tablet TAKE ONE TABLET BY MOUTH EVERY DAY 90 tablet 3    metoprolol tartrate (LOPRESSOR) 25 mg tablet TAKE ONE TABLET BY MOUTH TWICE DAILY 180 tablet 3    clindamycin (Cleocin T) 1 % lotion Apply 1 Application topically 2 (two) times a day Apply to face 2 times daily as needed 60 mL 1    pioglitazone (ACTOS)  15 mg tablet Take 1 tablet (15 mg total) by mouth daily Patient is unsure if he is taking this 90 tablet 3    latanoprost (XALATAN) 0.005 % ophthalmic solution Place 1 drop into the left eye nightly       No current facility-administered medications on file prior to visit.         Allergies   Allergen Reactions    Amoxicillin      HIVES, NAUSEA    Penicillins      HIVES, NAUSEA         Social History     Socioeconomic History    Marital status:      Spouse name: Not on file    Number of children: Not on file    Years of education: Not on file    Highest education level: Not on file   Occupational History    Not on file   Tobacco Use    Smoking status: Former     Current packs/day: 0.00     Types: Cigarettes    Smokeless tobacco: Never   Vaping Use    Vaping status: Never Used   Substance and Sexual Activity    Alcohol use: Yes     Comment: Occasionally    Drug use: No    Sexual activity: Defer   Other Topics Concern    Not on file   Social History Narrative    Not on file     Social Drivers of Health     Financial Resource Strain: Not on file   Food Insecurity: Not on file   Transportation Needs: Not on file   Physical Activity: Not on file   Stress: Not on file   Social Connections: Not on file   Intimate Partner Violence: Not on file   Housing Stability: Not on file         Family History   Problem Relation Age of Onset    Heart disease Mother     Heart attack Father         Myocardial infarction    Diabetes type II Brother         Toes removed due to diabetes       Comprehensive 14 point review of systems is otherwise unremarkable except what has been mentioned in the history of present illness      /84   Pulse 70   Temp 36.8 °C (98.3 °F)   Wt 102.6 kg (226 lb 3.2 oz)   SpO2 94%   BMI 30.68 kg/m²   General appearance: in no acute distress  Eyes: No stare, proptosis. Conjunctiva clear.  ENT: No external lesions. Hearing intact to the spoken word.  Lungs:   Respirations not labored  Heart: Regular  in rate   Neurologic:No focal deficit   Psychiatric: alert, appropriate, well dressed and groomed                 Component  Ref Range & Units 6 mo ago  (10/7/24) 2 yr ago  (6/8/22) 4 yr ago  (7/14/20) 6 yr ago  (10/9/18) 8 yr ago  (5/10/16) 8 yr ago  (5/10/16) 10 yr ago  (2/5/15) 10 yr ago  (2/5/15)   Triglycerides  <=149 mg/dL 582 High  540 High  349 High  328 High  421 High  R,  High  R 638 High  R,  High  R   Cholesterol  0 - 199 mg/dL 204 High  125 110 119 131 R  184 R    HDL  >=40 mg/dL 26 Low  26 Low  26 Low  28 Low  26 Low  R  24 Low  R           Lab Results   Component Value Date    HGBA1C 7.2 (H) 10/07/2024       Lab Results   Component Value Date    LDLCALC <20 (L) 07/14/2020         Lab Results   Component Value Date    MICROALBUR 15.6 06/08/2022         Lab Results   Component Value Date    TSH 4.360 10/07/2024       Lab Results   Component Value Date    GLUCOSE 146 (H) 10/07/2024    CALCIUM 9.5 10/07/2024     10/07/2024    K 4.7 10/07/2024    CO2 24 10/07/2024     10/07/2024    BUN 20 10/07/2024    CREATININE 0.90 10/07/2024    ANIONGAP 12 (H) 10/07/2024     Impression:  1.  Type 2 diabetes.  Currently uncontrolled. Improved.    2.  Hypothyroidism.  Recent TSH at goal    3.  Most recent LDL cholesterol has been at goal.  Repeat levels pending today    4.  Blood pressure at goal.    5.  Bilateral hamstring pain.  Will send PT referral    6.  Ongoing insomnia.  He has been on medication for this.  He cannot Rumer the name.  He will get back to us with this.  If he has not been on trazodone previously consider this        Plan:  1.  Continue current Jardiance  2.  Continue pioglitazone  3.  Continue metformin  4.  PT referral for bilateral hamstring pain  5.  He will get back to us with the medication he has been taking for insomnia.  Consider trazodone  6.  Follow-up in 6 months with repeat labs then    Fahad Mendoza DO

## 2025-04-11 RX ORDER — FENOFIBRATE 160 MG/1
160 TABLET ORAL DAILY
Qty: 30 TABLET | Refills: 11 | Status: SHIPPED | OUTPATIENT
Start: 2025-04-11 | End: 2026-04-11

## 2025-04-24 ENCOUNTER — OFFICE VISIT (OUTPATIENT)
Dept: FAMILY MEDICINE | Facility: CLINIC | Age: 80
End: 2025-04-24
Payer: MEDICARE

## 2025-04-24 VITALS
BODY MASS INDEX: 30.38 KG/M2 | SYSTOLIC BLOOD PRESSURE: 150 MMHG | WEIGHT: 224 LBS | HEART RATE: 86 BPM | OXYGEN SATURATION: 93 % | RESPIRATION RATE: 20 BRPM | TEMPERATURE: 97.6 F | DIASTOLIC BLOOD PRESSURE: 80 MMHG

## 2025-04-24 DIAGNOSIS — J06.9 URI WITH COUGH AND CONGESTION: Primary | ICD-10-CM

## 2025-04-24 NOTE — PROGRESS NOTES
Subjective      Abhilash Huggins is a 80 y.o. male who presents for illness.    History of Present Illness  Abhilash Huggins is an 80 year old male who presents with persistent cough.    He has been experiencing a persistent cough and difficulty sleeping, which required him to sit up in a recliner. He began taking azithromycin 250 mg and Tessalon 4 days ago, prescribed by his son-in-law, and reports feeling much better since starting the medication. He is on a five-day course of azithromycin, with tomorrow being the last day.    He describes having had chills earlier in the week but denies any current fever, chills, or night sweats. He has not taken his temperature. He experienced wheezing, particularly at night when lying in bed, but it has improved. Coughing helps clear the wheezing.    He is feeling better and almost canceled this visit.     The following have been reviewed and updated as appropriate in this visit:          Allergies   Allergen Reactions    Amoxicillin      HIVES, NAUSEA    Penicillins      HIVES, NAUSEA     Current Outpatient Medications   Medication Sig Dispense Refill    fenofibrate 160 mg tablet Take 1 tablet (160 mg total) by mouth daily 30 tablet 11    traZODone (DESYREL) 50 mg tablet Take 1 tablet (50 mg total) by mouth nightly 90 tablet 3    metFORMIN (GLUCOPHAGE) 1,000 mg tablet Take 1 tablet (1,000 mg total) by mouth 2 (two) times a day with meals 180 tablet 0    levothyroxine (SYNTHROID, LEVOTHROID) 175 mcg tablet TAKE ONE TABLET BY MOUTH EVERY DAY 90 tablet 1    lisinopriL (PRINIVIL,ZESTRIL) 10 mg tablet TAKE ONE TABLET BY MOUTH EVERY DAY 90 tablet 1    empagliflozin (Jardiance) 25 mg tablet TAKE ONE TABLET BY MOUTH EVERY DAY 90 tablet 1    tamsulosin (FLOMAX) 0.4 mg capsule TAKE TWO CAPSULES BY MOUTH EVERY DAY with dinner 180 capsule 3    clopidogreL (PLAVIX) 75 mg tablet TAKE ONE TABLET BY MOUTH EVERY DAY 90 tablet 3    metoprolol tartrate (LOPRESSOR) 25 mg tablet TAKE ONE TABLET BY MOUTH  TWICE DAILY 180 tablet 3    clindamycin (Cleocin T) 1 % lotion Apply 1 Application topically 2 (two) times a day Apply to face 2 times daily as needed 60 mL 1    pioglitazone (ACTOS) 15 mg tablet Take 1 tablet (15 mg total) by mouth daily Patient is unsure if he is taking this 90 tablet 3    latanoprost (XALATAN) 0.005 % ophthalmic solution Place 1 drop into the left eye nightly       No current facility-administered medications for this visit.     Past Medical History:   Diagnosis Date    Diabetes mellitus (CMS/HCC)     Hypertension     Hypothyroidism     Type 2 diabetes mellitus (CMS/HCC)      Past Surgical History:   Procedure Laterality Date    CARDIAC SURGERY  01/04/2016    Cardiac bypass X4 vessels    CARDIAC SURGERY  01/01/1997    2 stents    CATARACT EXTRACTION Left     CHOLECYSTECTOMY  02/14/2007    COLONOSCOPY      Approx 8 years ago with Dr Mcguire- was to have 5 year follow up    COLONOSCOPY  01/01/2015    Polyps     Family History   Problem Relation Age of Onset    Heart disease Mother     Heart attack Father         Myocardial infarction    Diabetes type II Brother         Toes removed due to diabetes     Social History     Socioeconomic History    Marital status:    Tobacco Use    Smoking status: Former     Current packs/day: 0.00     Types: Cigarettes    Smokeless tobacco: Never   Vaping Use    Vaping status: Never Used   Substance and Sexual Activity    Alcohol use: Yes     Comment: Occasionally    Drug use: No    Sexual activity: Defer     Social Drivers of Health     Tobacco Use: Medium Risk (4/24/2025)    Patient History     Smoking Tobacco Use: Former     Smokeless Tobacco Use: Never       Review of Systems  Negative ROS except noted in HPI    Objective   /80 (BP Location: Left arm, Patient Position: Sitting, Cuff Size: Large Adult)   Pulse 86   Temp 36.4 °C (97.6 °F) (Temporal)   Resp 20   Wt 101.6 kg (224 lb)   SpO2 93%   BMI 30.38 kg/m²     Physical Exam  Vitals and  nursing note reviewed.   Constitutional:       Appearance: Normal appearance.   Cardiovascular:      Rate and Rhythm: Normal rate and regular rhythm.      Heart sounds: Normal heart sounds.   Pulmonary:      Breath sounds: Wheezing present.      Comments: Wheezing cleared with coughing  Neurological:      Mental Status: He is alert and oriented to person, place, and time.   Psychiatric:         Mood and Affect: Mood normal.         Behavior: Behavior normal.         Assessment & Plan  URI  Wheezing cleared with coughing, improved with azithromycin and Tessalon. Likely resolving pneumonia or bronchitis. No chest x-ray needed due to improvement.  - Continue azithromycin and Tessalon.  - Monitor for fever, chills, or increased cough.  - Return next week if symptoms persist.  - Consider chest x-ray if symptoms worsen or do not improve.  - Seek urgent care if symptoms acutely worsen over the weekend.      Diagnosis Plan   1. URI with cough and congestion              Alma Albright, CNP

## 2025-05-30 DIAGNOSIS — E11.9 TYPE 2 DIABETES MELLITUS WITHOUT COMPLICATION, WITHOUT LONG-TERM CURRENT USE OF INSULIN (CMS/HCC): ICD-10-CM

## 2025-05-30 RX ORDER — PIOGLITAZONE 15 MG/1
15 TABLET ORAL DAILY
Qty: 90 TABLET | Refills: 3 | Status: SHIPPED | OUTPATIENT
Start: 2025-05-30

## 2025-07-04 DIAGNOSIS — I10 ESSENTIAL HYPERTENSION: ICD-10-CM

## 2025-07-04 RX ORDER — LISINOPRIL 10 MG/1
10 TABLET ORAL DAILY
Qty: 90 TABLET | Refills: 2 | Status: SHIPPED | OUTPATIENT
Start: 2025-07-04

## 2025-07-04 NOTE — TELEPHONE ENCOUNTER
No care due was identified.  [facility] Embedded Care Due Messages. Reference number: [id number]. 7/04/2025 8:01:52 AM RYAN

## 2025-07-16 DIAGNOSIS — E11.9 TYPE 2 DIABETES MELLITUS WITHOUT COMPLICATION, WITHOUT LONG-TERM CURRENT USE OF INSULIN (CMS/HCC): ICD-10-CM

## 2025-07-16 RX ORDER — EMPAGLIFLOZIN 25 MG/1
1 TABLET, FILM COATED ORAL DAILY
Qty: 90 TABLET | Refills: 1 | Status: SHIPPED | OUTPATIENT
Start: 2025-07-16

## 2025-07-16 NOTE — TELEPHONE ENCOUNTER
No care due was identified.  [facility] Embedded Care Due Messages. Reference number: [id number]. 7/16/2025 8:02:00 AM RYAN  
Stress Test - Done prior to Ablation - does not remember who he saw -

## 2025-07-17 DIAGNOSIS — E11.9 TYPE 2 DIABETES MELLITUS WITHOUT COMPLICATION, WITHOUT LONG-TERM CURRENT USE OF INSULIN (CMS/HCC): ICD-10-CM

## 2025-07-17 RX ORDER — METFORMIN HYDROCHLORIDE 1000 MG/1
1000 TABLET ORAL 2 TIMES DAILY WITH MEALS
Qty: 180 TABLET | Refills: 1 | Status: SHIPPED | OUTPATIENT
Start: 2025-07-17

## 2025-07-17 NOTE — TELEPHONE ENCOUNTER
No care due was identified.  [facility] Embedded Care Due Messages. Reference number: [id number]. 7/17/2025 10:59:52 AM RYAN

## 2025-07-28 ENCOUNTER — HOSPITAL ENCOUNTER (EMERGENCY)
Facility: HOSPITAL | Age: 80
Discharge: HOME | End: 2025-07-28
Payer: MEDICARE

## 2025-07-28 VITALS
DIASTOLIC BLOOD PRESSURE: 90 MMHG | HEIGHT: 72 IN | TEMPERATURE: 98.1 F | HEART RATE: 88 BPM | OXYGEN SATURATION: 93 % | WEIGHT: 224 LBS | RESPIRATION RATE: 18 BRPM | SYSTOLIC BLOOD PRESSURE: 136 MMHG | BODY MASS INDEX: 30.34 KG/M2

## 2025-07-28 DIAGNOSIS — T78.40XA ALLERGIC REACTION, INITIAL ENCOUNTER: Primary | ICD-10-CM

## 2025-07-28 PROCEDURE — 96375 TX/PRO/DX INJ NEW DRUG ADDON: CPT

## 2025-07-28 PROCEDURE — 96374 THER/PROPH/DIAG INJ IV PUSH: CPT

## 2025-07-28 PROCEDURE — 99283 EMERGENCY DEPT VISIT LOW MDM: CPT | Performed by: NURSE PRACTITIONER

## 2025-07-28 PROCEDURE — 99284 EMERGENCY DEPT VISIT MOD MDM: CPT | Mod: 25

## 2025-07-28 PROCEDURE — 6370000100 HC RX 637 (ALT 250 FOR IP): Performed by: NURSE PRACTITIONER

## 2025-07-28 PROCEDURE — 6360000200 HC RX 636 W HCPCS (ALT 250 FOR IP): Mod: JZ | Performed by: NURSE PRACTITIONER

## 2025-07-28 RX ORDER — DIPHENHYDRAMINE HYDROCHLORIDE 50 MG/ML
50 INJECTION, SOLUTION INTRAMUSCULAR; INTRAVENOUS ONCE
Status: DISCONTINUED | OUTPATIENT
Start: 2025-07-28 | End: 2025-07-28

## 2025-07-28 RX ORDER — FAMOTIDINE 10 MG/ML
40 INJECTION, SOLUTION INTRAVENOUS ONCE
Status: COMPLETED | OUTPATIENT
Start: 2025-07-28 | End: 2025-07-28

## 2025-07-28 RX ORDER — LORATADINE 10 MG/1
10 TABLET ORAL ONCE
Status: COMPLETED | OUTPATIENT
Start: 2025-07-28 | End: 2025-07-28

## 2025-07-28 RX ADMIN — LORATADINE 10 MG: 10 TABLET ORAL at 17:22

## 2025-07-28 RX ADMIN — FAMOTIDINE 40 MG: 10 INJECTION INTRAVENOUS at 17:20

## 2025-07-28 RX ADMIN — METHYLPREDNISOLONE SODIUM SUCCINATE 125 MG: 125 INJECTION, POWDER, FOR SOLUTION INTRAMUSCULAR; INTRAVENOUS at 17:20

## 2025-07-28 ASSESSMENT — ENCOUNTER SYMPTOMS
DIZZINESS: 0
CHILLS: 0
SHORTNESS OF BREATH: 1
PSYCHIATRIC NEGATIVE: 1
HEADACHES: 0
GASTROINTESTINAL NEGATIVE: 1
COLOR CHANGE: 1
MUSCULOSKELETAL NEGATIVE: 1
CARDIOVASCULAR NEGATIVE: 1
COUGH: 0
FEVER: 0

## 2025-07-28 NOTE — ED PROVIDER NOTES
"    HPI:  Chief Complaint   Patient presents with    Allergic Reaction     Pt was bit by deer flies approximately 2 hours ago and and is c/o painful swallowing and is \"having a hard time talking\"       HPI  80-year-old male patient presents to the ER with complaints of allergic reaction, was bitten by deer flies at home approximately 2 hours ago, reports that he has had reactions to deer 5 bites before.  He took 100 mg of Benadryl at home prior to coming in.  Came in to be evaluated because he felt like his lips and his tongue were swelling and he was having a hard time talking, felt slightly short of breath.      HISTORY:  Past Medical History:   Diagnosis Date    Diabetes mellitus (CMS/HCC)     Hypertension     Hypothyroidism     Type 2 diabetes mellitus (CMS/HCC)        Past Surgical History:   Procedure Laterality Date    CARDIAC SURGERY  01/04/2016    Cardiac bypass X4 vessels    CARDIAC SURGERY  01/01/1997    2 stents    CATARACT EXTRACTION Left     CHOLECYSTECTOMY  02/14/2007    COLONOSCOPY      Approx 8 years ago with Dr Mcguire- was to have 5 year follow up    COLONOSCOPY  01/01/2015    Polyps       Family History   Problem Relation Age of Onset    Heart disease Mother     Heart attack Father         Myocardial infarction    Diabetes type II Brother         Toes removed due to diabetes       Social History     Tobacco Use    Smoking status: Former     Current packs/day: 0.00     Types: Cigarettes    Smokeless tobacco: Never   Vaping Use    Vaping status: Never Used   Substance Use Topics    Alcohol use: Yes     Comment: Occasionally    Drug use: No         ROS:  Review of Systems   Constitutional:  Negative for chills and fever.   HENT:          Lips and tongue swelling   Respiratory:  Positive for shortness of breath. Negative for cough.    Cardiovascular: Negative.    Gastrointestinal: Negative.    Genitourinary: Negative.    Musculoskeletal: Negative.    Skin:  Positive for color change. "   Neurological:  Negative for dizziness and headaches.   Psychiatric/Behavioral: Negative.         PE:  ED Triage Vitals   Temp Heart Rate Resp BP SpO2   07/28/25 1726 07/28/25 1726 07/28/25 1726 07/28/25 1726 07/28/25 1726   36.7 °C (98.1 °F) 92 20 134/84 95 %      Mean BP (mmHg) Temp Source Heart Rate Source Patient Position BP Location   07/28/25 1745 07/28/25 1726 -- -- --   128 Temporal         FiO2 (%)       --                  Physical Exam  Vitals and nursing note reviewed. Exam conducted with a chaperone present.   Constitutional:       General: He is not in acute distress.  HENT:      Head: Normocephalic and atraumatic.      Mouth/Throat:      Mouth: Mucous membranes are moist. Angioedema present.      Pharynx: No pharyngeal swelling.      Comments: Lips feel tingly and do appear swollen, tongue swollen  Eyes:      Extraocular Movements: Extraocular movements intact.      Pupils: Pupils are equal, round, and reactive to light.   Cardiovascular:      Rate and Rhythm: Normal rate and regular rhythm.   Pulmonary:      Effort: Pulmonary effort is normal.      Breath sounds: Normal breath sounds.   Abdominal:      General: Bowel sounds are normal.      Palpations: Abdomen is soft.      Tenderness: There is no abdominal tenderness.   Musculoskeletal:      Cervical back: Neck supple.      Right lower leg: No edema.      Left lower leg: No edema.   Skin:     General: Skin is warm and dry.      Capillary Refill: Capillary refill takes less than 2 seconds.      Findings: Rash present. Rash is macular and urticarial.      Comments: Right hand swollen    Patient has scattered areas of macular erythematous rash, under his left armpit, on his left chest, and on his back   Neurological:      Mental Status: He is alert and oriented to person, place, and time.   Psychiatric:         Mood and Affect: Mood normal.         ED LABS:  Labs Reviewed - No data to display      ED IMAGES:  No orders to display       ED  PROCEDURES:  Procedures    ED COURSE:  ED Course as of 07/28/25 1944 Mon Jul 28, 2025 1736 Solu-Medrol, Pepcid, Claritin ordered.  Patient has already taken 100 mg of Benadryl orally at home. [AW]   1901 Patient feeling significantly improved, vital signs are stable.  He no longer has a burning/tingling sensation in his lips.  His tongue is feeling more normal size.  He does not feel itchy.  No shortness of breath.    I discussed with patient and his wife over-the-counter medications to use for the next couple of days to reduce histamine response.  Return precautions discussed. [AW]      ED Course User Index  [AW] Sherry Lee, CNP          Sepsis Quality Bundle           MDM:  Medical Decision Making    80-year-old male patient presented to the ER with complaints of an allergic reaction.  Patient was bit by some deer flies about 2 hours prior to arrival, patient reports that he has reacted to deer fly bites before, but never this severe.  He had swelling in his right hand, his lips and tongue felt tingly and he had burning of his lips as well.  His wife had given him 100 mg of Benadryl prior to arrival.    In the ER, patient received Solu-Medrol, Pepcid, oral Claritin.  We did not give more Benadryl as he had taken 100 mg already.  Telemetry was monitored while in the ER, patient's heart rate remained stable, oxygen saturation was in the mid to upper 90s on room air.    Patient was monitored for approximately 2 hours in the ER, he felt significantly improved after the steroid and Pepcid.    I recommend that he continue Pepcid 40 mg daily for the next 2 to 3 days as well as a Claritin or Zyrtec daily.  He can take Benadryl 25 mg every 6 hours as needed.  If he experiences any further lip swelling, tongue swelling or tingling, or feel short of breath, he needs to return right away to the ER.  Patient expresses understanding of return precautions and discharge instructions.  Discharged home with his wife in  stable condition.    Final diagnoses:   [T78.40XA] Allergic reaction, initial encounter     7/28/2025  6:59 PM                                        Sherry Lee, CNP  07/1945

## 2025-07-29 NOTE — DISCHARGE INSTRUCTIONS
You were seen in the ER with an allergic reaction to a fly bite    Recommend that you take Pepcid AC, 40 mg daily for the next 2 to 3 days    Recommend that you take Claritin or Zyrtec daily for the next 2 to 3 days    You can take Benadryl 25 mg every 6 hours as needed    If you experience another bite, if you experience lip swelling or tongue swelling, feel lightheaded or dizzy, or feel short of breath, you need to return to the ER right away

## 2025-08-08 DIAGNOSIS — E03.9 HYPOTHYROIDISM, UNSPECIFIED TYPE: ICD-10-CM

## 2025-08-08 RX ORDER — LEVOTHYROXINE SODIUM 175 UG/1
175 TABLET ORAL DAILY
Qty: 90 TABLET | Refills: 1 | Status: SHIPPED | OUTPATIENT
Start: 2025-08-08

## 2025-08-27 ENCOUNTER — TELEPHONE (OUTPATIENT)
Dept: FAMILY MEDICINE | Facility: CLINIC | Age: 80
End: 2025-08-27
Payer: MEDICARE

## 2025-08-27 DIAGNOSIS — E78.2 MIXED HYPERLIPIDEMIA: ICD-10-CM

## 2025-08-27 DIAGNOSIS — N40.1 BENIGN PROSTATIC HYPERPLASIA WITH LOWER URINARY TRACT SYMPTOMS, SYMPTOM DETAILS UNSPECIFIED: ICD-10-CM

## 2025-08-27 DIAGNOSIS — Z00.00 MEDICARE ANNUAL WELLNESS VISIT, SUBSEQUENT: ICD-10-CM

## 2025-08-27 DIAGNOSIS — E11.9 TYPE 2 DIABETES MELLITUS WITHOUT COMPLICATION, WITHOUT LONG-TERM CURRENT USE OF INSULIN (CMS/HCC): Primary | ICD-10-CM

## 2025-08-27 DIAGNOSIS — I10 ESSENTIAL HYPERTENSION: ICD-10-CM
